# Patient Record
Sex: FEMALE | Race: WHITE | NOT HISPANIC OR LATINO | Employment: OTHER | URBAN - NONMETROPOLITAN AREA
[De-identification: names, ages, dates, MRNs, and addresses within clinical notes are randomized per-mention and may not be internally consistent; named-entity substitution may affect disease eponyms.]

---

## 2017-01-03 RX ORDER — LORAZEPAM 1 MG/1
TABLET ORAL
Qty: 60 TABLET | Refills: 0
Start: 2017-01-03 | End: 2017-02-07 | Stop reason: SDUPTHER

## 2017-01-18 DIAGNOSIS — K21.9 GASTROESOPHAGEAL REFLUX DISEASE WITHOUT ESOPHAGITIS: ICD-10-CM

## 2017-01-18 RX ORDER — OMEPRAZOLE 40 MG/1
40 CAPSULE, DELAYED RELEASE ORAL 2 TIMES DAILY
Qty: 180 CAPSULE | Refills: 1 | Status: SHIPPED | OUTPATIENT
Start: 2017-01-18 | End: 2017-04-04

## 2017-01-18 NOTE — TELEPHONE ENCOUNTER
Patient requests refills on Omeprazole 40 mg sent to [facility] in a 90 day supply.

## 2017-01-23 ENCOUNTER — OFFICE VISIT (OUTPATIENT)
Dept: OBSTETRICS AND GYNECOLOGY | Facility: CLINIC | Age: 62
End: 2017-01-23

## 2017-01-23 VITALS
SYSTOLIC BLOOD PRESSURE: 122 MMHG | HEIGHT: 69 IN | WEIGHT: 157 LBS | BODY MASS INDEX: 23.25 KG/M2 | DIASTOLIC BLOOD PRESSURE: 60 MMHG

## 2017-01-23 DIAGNOSIS — Z12.4 SCREENING FOR MALIGNANT NEOPLASM OF CERVIX: ICD-10-CM

## 2017-01-23 DIAGNOSIS — N95.1 MENOPAUSAL AND FEMALE CLIMACTERIC STATES: ICD-10-CM

## 2017-01-23 DIAGNOSIS — Z01.419 ENCOUNTER FOR GYNECOLOGICAL EXAMINATION WITHOUT ABNORMAL FINDING: Primary | ICD-10-CM

## 2017-01-23 PROCEDURE — 99396 PREV VISIT EST AGE 40-64: CPT | Performed by: PHYSICIAN ASSISTANT

## 2017-01-23 RX ORDER — ESTRADIOL 2 MG/1
2 RING VAGINAL
Qty: 1 EACH | Refills: 3 | Status: SHIPPED | OUTPATIENT
Start: 2017-01-23 | End: 2017-04-01 | Stop reason: SDUPTHER

## 2017-01-23 NOTE — PROGRESS NOTES
Subjective   Chief Complaint   Patient presents with   • Gynecologic Exam     Leticia Arreguin is a 61 y.o. year old  presenting to be seen for her annual gyn exam.  Patient has no complaints or concerns  She desires refills estring which works well for her  Her last mmg was 2015    Past Medical History   Diagnosis Date   • Anxiety    • Asthma      AS CHILD   • Colonic polyp    • Depression    • Diverticulosis    • Dyslipidemia    • Electrocardiogram finding, abnormal, without diagnosis    • Elevated LFTs    • Esophageal reflux    • Fall    • Female pelvic pain    • Fibromyalgia    • Fracture    • GERD (gastroesophageal reflux disease)    • Gestational diabetes    • H/O mammogram    • Headache    • History of blood transfusion    • History of Holter monitoring      Findings were normal..    • Hypertension    • Insomnia    • Joint pain    • Migraine    • Osteoarthritis    • Ovarian cyst    • Pain in joint of left hip    • Recurrent UTI (urinary tract infection)    • Rheumatic fever    • Shoulder sprain    • Swelling of ankle joint    • Tinnitus    • Vaginitis    • Vitamin B12 deficiency    • Vitamin D deficiency disease         Current Outpatient Prescriptions:   •  atorvastatin (LIPITOR) 40 MG tablet, Take 1 tablet by mouth Daily., Disp: 90 tablet, Rfl: 1  •  ESTRING 2 MG vaginal ring, Insert 2 mg into the vagina Every 3 (Three) Months., Disp: 1 each, Rfl: 3  •  omeprazole (priLOSEC) 40 MG capsule, Take 1 capsule by mouth 2 (Two) Times a Day., Disp: 180 capsule, Rfl: 1  •  sertraline (ZOLOFT) 50 MG tablet, Take 1.5 tablets by mouth Every Night for 90 days., Disp: 135 tablet, Rfl: 0  •  triamterene-hydrochlorothiazide (MAXZIDE-25) 37.5-25 MG per tablet, Take 1 tablet by mouth Daily., Disp: 90 tablet, Rfl: 0  •  LORazepam (ATIVAN) 1 MG tablet, 1/2 tab po qam and q afternoon as needed for anxiety and 1 tab po nightly, Disp: 60 tablet, Rfl: 0  •  mirtazapine (REMERON) 30 MG tablet, Take 1.5 tablets by mouth  Every Night., Disp: 30 tablet, Rfl: 0   Allergies   Allergen Reactions   • Clarithromycin    • Gabapentin    • Nitrofurantoin Nausea Only   • Penicillins    • Risperidone    • Sulfa Antibiotics    • Sulfamethoxazole-Trimethoprim    • Zyprexa [Olanzapine] Other (See Comments)     Extreme agitation   • Ciprofloxacin Rash      Past Surgical History   Procedure Laterality Date   • Appendectomy     • Cholecystectomy     • Colonoscopy       08/06/2016 DR. RAY   • Dilation and curettage, diagnostic / therapeutic     • Total hip arthroplasty Bilateral      LEFT-2010   RIGHT-2003   • Tonsillectomy     • Wrist surgery     • Hip surgery       LEFT HIP REVISION DONOR BONE 06/03/2014   • Abdominal surgery     • Fracture surgery     • Joint replacement     • Electroconvulsive therapy Bilateral 10/6/2016     Procedure: BIFRONTAL ELECTROCONVULSIVE THERAPY;  Surgeon: Omi Foss MD;  Location: [facility] OR;  Service:    • Electroconvulsive therapy Bilateral 10/11/2016     Procedure: BIFRONTAL ELECTROCONVULSIVE THERAPY;  Surgeon: Omi Foss MD;  Location: [facility] OR;  Service:    • Electroconvulsive therapy Bilateral 10/17/2016     Procedure: BIFRONTAL ELECTROCONVULSIVE THERAPY;  Surgeon: Omi Foss MD;  Location: [facility] OR;  Service:    • Electroconvulsive therapy Bilateral 10/28/2016     Procedure: ELECTROCONVULSIVE THERAPY;  Surgeon: Omi Foss MD;  Location: [facility] OR;  Service:    • Electroconvulsive therapy Bilateral 11/1/2016     Procedure: ELECTROCONVULSIVE THERAPY;  Surgeon: Omi Foss MD;  Location: [facility] OR;  Service:    • Electroconvulsive therapy Bilateral 11/3/2016     Procedure: ELECTROCONVULSIVE THERAPY;  Surgeon: Omi Foss MD;  Location: [facility] OR;  Service:    • Total hip arthroplasty  2004,2011,2015      Social History     Social History   • Marital status:      Spouse name: N/A   • Number of children: N/A   • Years of  "education: N/A     Occupational History   • Not on file.     Social History Main Topics   • Smoking status: Never Smoker   • Smokeless tobacco: Never Used   • Alcohol use No   • Drug use: No   • Sexual activity: Yes     Partners: Male     Other Topics Concern   • Not on file     Social History Narrative      Family History   Problem Relation Age of Onset   • Arthritis Mother    • Ovarian cancer Mother    • Stomach cancer Mother    • Migraines Mother    • Mental illness Mother    • Depression Mother    • Cancer Mother    • Arthritis Father    • Seizures Father      EPILEPSY   • Hypertension Father    • Osteoporosis Father    • Mental illness Father    • Hyperlipidemia Father    • Stroke Father    • Depression Father    • Arthritis Sister    • Cancer Sister    • Migraines Sister    • Cancer Other    • Diabetes Other    • Arthritis Other    • ADD / ADHD Neg Hx    • Alcohol abuse Neg Hx    • Anxiety disorder Neg Hx    • Bipolar disorder Neg Hx    • Dementia Neg Hx    • Drug abuse Neg Hx    • Paranoid behavior Neg Hx    • Schizophrenia Neg Hx    • Suicide Attempts Neg Hx        The following portions of the patient's history were reviewed and updated as appropriate:problem list, current medications, allergies, past family history, past medical history, past social history and past surgical history.    Review of Systems   Constitutional: Positive for unexpected weight change.   Gastrointestinal: Negative.    Genitourinary: Negative.    Musculoskeletal: Positive for arthralgias.           Objective   Visit Vitals   • /60   • Ht 69\" (175.3 cm)   • Wt 157 lb (71.2 kg)   • BMI 23.18 kg/m2       Physical Exam   Constitutional: Vital signs are normal. She appears well-developed and well-nourished.   Neck: No thyroid mass and no thyromegaly present.   Cardiovascular: Normal rate, regular rhythm and normal heart sounds.    Pulmonary/Chest: Effort normal.   Abdominal: Soft. Normal appearance. She exhibits no distension and " no mass. There is no tenderness.   Genitourinary: Vagina normal and uterus normal. There is no rash or tenderness on the right labia. There is no rash or tenderness on the left labia. Cervix exhibits no motion tenderness, no discharge and no friability. Right adnexum displays no mass and no tenderness. Left adnexum displays no mass and no tenderness.   Genitourinary Comments: Pap done   Skin: Skin is warm, dry and intact.   Psychiatric: She has a normal mood and affect. Her behavior is normal.            Assessment /Plan      Leticia was seen today for gynecologic exam.    Diagnoses and all orders for this visit:    Encounter for gynecological examination without abnormal finding  -     Mammo Screening Bilateral With CAD; Future    Menopausal and female climacteric states    Screening for malignant neoplasm of cervix  -     Liquid-based Pap Smear, Screening    Other orders  -     ESTRING 2 MG vaginal ring; Insert 2 mg into the vagina Every 3 (Three) Months.               This note was electronically signed.    Tonya Weaver PA-C   January 23, 2017

## 2017-01-23 NOTE — MR AVS SNAPSHOT
Leticia Arreguin   1/23/2017 3:40 PM   Office Visit    Dept Phone:  [phone]   Encounter #:  [id number]    Provider:  Tonya Weaver PA-C   Department:  [facility] OBSTETRICS AND GYNECOLOGY                Your Full Care Plan              Today's Medication Changes          These changes are accurate as of: 1/23/17  4:28 PM.  If you have any questions, ask your nurse or doctor.               Medication(s)that have changed:     ESTRING 2 MG vaginal ring   Generic drug:  estradiol   Insert 2 mg into the vagina Every 3 (Three) Months.   What changed:    - how much to take  - how to take this  - when to take this   Changed by:  Tonya Weaver PA-C            Where to Get Your Medications      These medications were sent to [facility]/pharmacy #[id number] - [place], [place] - [address] - [phone]  - [phone]   [address] [address]     Phone:  [phone]     ESTRING 2 MG vaginal ring                  Your Updated Medication List          This list is accurate as of: 1/23/17  4:28 PM.  Always use your most recent med list.                atorvastatin 40 MG tablet   Commonly known as:  LIPITOR   Take 1 tablet by mouth Daily.       ESTRING 2 MG vaginal ring   Generic drug:  estradiol   Insert 2 mg into the vagina Every 3 (Three) Months.       LORazepam 1 MG tablet   Commonly known as:  ATIVAN   1/2 tab po qam and q afternoon as needed for anxiety and 1 tab po nightly       mirtazapine 30 MG tablet   Commonly known as:  REMERON   Take 1.5 tablets by mouth Every Night.       omeprazole 40 MG capsule   Commonly known as:  priLOSEC   Take 1 capsule by mouth 2 (Two) Times a Day.       sertraline 50 MG tablet   Commonly known as:  ZOLOFT   Take 1.5 tablets by mouth Every Night for 90 days.       triamterene-hydrochlorothiazide 37.5-25 MG per tablet   Commonly known as:  MAXZIDE-25   Take 1 tablet by mouth Daily.               We Performed the Following     Liquid-based Pap Smear, Screening       You Were Diagnosed With        Codes Comments    Encounter for gynecological examination without abnormal finding    -  Primary ICD-10-CM: Z01.419  ICD-9-CM: V72.31     Menopausal and female climacteric states     ICD-10-CM: N95.1  ICD-9-CM: 627.2     Screening for malignant neoplasm of cervix     ICD-10-CM: Z12.4  ICD-9-CM: V76.2       Instructions     None    Patient Instructions History      Upcoming Appointments     Visit Type Date Time Department    ANNUAL 2017  3:40 PM [facility]    MEDICINE CHECK 2017  4:30 PM [facility]      [employer] Signup     [employer] allows you to send messages to your doctor, view your test results, renew your prescriptions, schedule appointments, and more. To sign up, go to [employer] and click on the Sign Up Now link in the New User? box. Enter your [employer] Activation Code exactly as it appears below along with the last four digits of your Social Security Number and your Date of Birth () to complete the sign-up process. If you do not sign up before the expiration date, you must request a new code.    [employer] Activation Code: [email or web address]  Expires: 2017  5:36 AM    If you have questions, you can email [email or web address] or call [phone] to talk to our [employer] staff. Remember, [employer] is NOT to be used for urgent needs. For medical emergencies, dial 911.               Other Info from Your Visit           Your Appointments     2017  4:30 PM EST   Medicine Check with Rafael Sinclair MD   [facility] (--)    [address] [address] [phone]              Allergies     Clarithromycin      Gabapentin      Nitrofurantoin  Nausea Only    Penicillins      Risperidone      Sulfa Antibiotics      Sulfamethoxazole-trimethoprim      Zyprexa [Olanzapine] Intolerance Other (See Comments)    Extreme agitation  "   Ciprofloxacin  Rash      Reason for Visit     Gynecologic Exam           Vital Signs     Blood Pressure Height Weight Body Mass Index Smoking Status       122/60 69\" (175.3 cm) 157 lb (71.2 kg) 23.18 kg/m2 Never Smoker       Problems and Diagnoses Noted     Encounter for routine gynecological examination    -  Primary    Menopausal and female climacteric states        Screening for cervical cancer            "

## 2017-02-07 ENCOUNTER — OFFICE VISIT (OUTPATIENT)
Dept: PSYCHIATRY | Facility: CLINIC | Age: 62
End: 2017-02-07

## 2017-02-07 VITALS — WEIGHT: 156 LBS | BODY MASS INDEX: 23.11 KG/M2 | HEIGHT: 69 IN

## 2017-02-07 DIAGNOSIS — F33.2 MDD (MAJOR DEPRESSIVE DISORDER), RECURRENT SEVERE, WITHOUT PSYCHOSIS (HCC): Primary | ICD-10-CM

## 2017-02-07 DIAGNOSIS — F41.1 GAD (GENERALIZED ANXIETY DISORDER): ICD-10-CM

## 2017-02-07 PROCEDURE — 99213 OFFICE O/P EST LOW 20 MIN: CPT | Performed by: PSYCHIATRY & NEUROLOGY

## 2017-02-07 RX ORDER — LORAZEPAM 1 MG/1
1 TABLET ORAL NIGHTLY
Qty: 60 TABLET | Refills: 0
Start: 2017-02-07 | End: 2017-02-27 | Stop reason: SDUPTHER

## 2017-02-07 NOTE — PROGRESS NOTES
"  CC:f/u MDD    HX:  The patient reports she is doing okay.  She reports her depression and anxiety are under much better control despite major issues in her family.  She's had the deal with the unexpected death of her brother-in-law, her son being issued divorce papers over the holiday which required him to live with her until this past weekend.  Her  is also now working full-time.  These have been stressful but says she is managing.  She quit taking the Remeron in January because she is having difficulty with weight gain.  I discussed that her weight has remained stable for the past several months, however she feels unhealthy and feels like this is putting unneeded pressure on her hips.  She says she is staying active and yet has not been able to lose weight.  She also has cut Ativan down to 1 mg nightly.  She has not seen any difference in her libido since stopping the Remeron.  She denies any suicidal or homicidal thoughts.      Appetite-fair, no changes  Energy level-*good.  Sleep-good sleep initiation however is awakening at 3 AM and having difficulty getting back to sleep.    I have reviewed pt's allergies and current medications.     Review of Systems   Constitutional: Negative.    Cardiovascular: Negative.    Gastrointestinal: Negative.    Neurological: Negative.      Visit Vitals   • Ht 69\" (175.3 cm)   • Wt 156 lb (70.8 kg)   • BMI 23.04 kg/m2       EXAM: Neatly, casually dressed, good hygeine. Gait and station stable. No psychomotor agitation/retardation. No motor tics Speech is normal rate, amount. Associations intact. Mood \"I'm fine\" affect slightly anxious.. TC-goal directed.  Denies SI/HI/VH/AH. TP-linear.  Attention and concentration are fair. Memory is intact for recent and remote events. Insight-limited, judgement- limited      Encounter Diagnoses   Name Primary?   • MDD (major depressive disorder), recurrent severe, without psychosis Yes   • INDIRA (generalized anxiety disorder)  "       Current Outpatient Prescriptions   Medication Sig Dispense Refill   • atorvastatin (LIPITOR) 40 MG tablet Take 1 tablet by mouth Daily. 90 tablet 1   • ESTRING 2 MG vaginal ring Insert 2 mg into the vagina Every 3 (Three) Months. 1 each 3   • LORazepam (ATIVAN) 1 MG tablet Take 1 tablet by mouth Every Night. 1/2 tab po qam and q afternoon as needed for anxiety and 1 tab po nightly 60 tablet 0   • omeprazole (priLOSEC) 40 MG capsule Take 1 capsule by mouth 2 (Two) Times a Day. 180 capsule 1   • sertraline (ZOLOFT) 50 MG tablet Take 1.5 tablets by mouth Every Night for 90 days. 135 tablet 0   • triamterene-hydrochlorothiazide (MAXZIDE-25) 37.5-25 MG per tablet Take 1 tablet by mouth Daily. 90 tablet 0     No current facility-administered medications for this visit.     plan:  1.  I discussed that she can try decreasing Zoloft to 50 mg however this may worsen her depression and anxiety.  She understood this risk but wanted to do this anyway if it would help with her weight gain.  2.  Continue Ativan at current dose.  I instructed her if she felt like she did not need it she could take half a tablet instead but not to quit it all at once.  3.  I again encouraged her to see a therapist; however the patient made multiple excuses particularly regarding time about not being able to see someone.  4.  Return to clinic 3 months or sooner if needed.          The risks, benefits, and treatment alternatives were discussed with the patient.     TIME IN: 436PM  TIME OUT:5:01P

## 2017-02-17 RX ORDER — MIRTAZAPINE 30 MG/1
TABLET, FILM COATED ORAL
Qty: 90 TABLET | Refills: 0 | OUTPATIENT
Start: 2017-02-17

## 2017-02-24 RX ORDER — TRIAMTERENE AND HYDROCHLOROTHIAZIDE 37.5; 25 MG/1; MG/1
TABLET ORAL
Qty: 90 TABLET | Refills: 0 | Status: SHIPPED | OUTPATIENT
Start: 2017-02-24 | End: 2017-04-04 | Stop reason: SDUPTHER

## 2017-02-24 NOTE — TELEPHONE ENCOUNTER
Patient has not been seen since October and I know she has many issues.  Was not sure if I should send refills.

## 2017-02-27 RX ORDER — LORAZEPAM 1 MG/1
1 TABLET ORAL NIGHTLY
Qty: 60 TABLET | Refills: 0 | Status: CANCELLED
Start: 2017-02-27

## 2017-02-27 RX ORDER — LORAZEPAM 1 MG/1
1 TABLET ORAL NIGHTLY
Qty: 60 TABLET | Refills: 0
Start: 2017-02-27 | End: 2017-04-25 | Stop reason: SDUPTHER

## 2017-02-27 NOTE — TELEPHONE ENCOUNTER
Please call in lorazepam 1 mg nightly, #60, no refills    Also, Zoloft 50 mg nightly, #90, no refills    I had changed her doses which should be reflected on her medication list

## 2017-03-14 ENCOUNTER — OFFICE VISIT (OUTPATIENT)
Dept: FAMILY MEDICINE CLINIC | Facility: CLINIC | Age: 62
End: 2017-03-14

## 2017-03-14 VITALS
SYSTOLIC BLOOD PRESSURE: 122 MMHG | TEMPERATURE: 98.3 F | HEART RATE: 74 BPM | HEIGHT: 69 IN | RESPIRATION RATE: 16 BRPM | DIASTOLIC BLOOD PRESSURE: 73 MMHG | WEIGHT: 156 LBS | BODY MASS INDEX: 23.11 KG/M2 | OXYGEN SATURATION: 100 %

## 2017-03-14 DIAGNOSIS — J01.90 ACUTE BACTERIAL SINUSITIS: ICD-10-CM

## 2017-03-14 DIAGNOSIS — B96.89 ACUTE BACTERIAL SINUSITIS: ICD-10-CM

## 2017-03-14 DIAGNOSIS — Z20.818 STREP THROAT EXPOSURE: Primary | ICD-10-CM

## 2017-03-14 DIAGNOSIS — R68.89 FLU-LIKE SYMPTOMS: ICD-10-CM

## 2017-03-14 LAB
EXPIRATION DATE: NORMAL
EXPIRATION DATE: NORMAL
FLUAV AG NPH QL: NORMAL
FLUBV AG NPH QL: NORMAL
INTERNAL CONTROL: NORMAL
INTERNAL CONTROL: NORMAL
Lab: NORMAL
Lab: NORMAL
S PYO AG THROAT QL: NEGATIVE

## 2017-03-14 PROCEDURE — 87880 STREP A ASSAY W/OPTIC: CPT | Performed by: INTERNAL MEDICINE

## 2017-03-14 PROCEDURE — 99213 OFFICE O/P EST LOW 20 MIN: CPT | Performed by: INTERNAL MEDICINE

## 2017-03-14 PROCEDURE — 87804 INFLUENZA ASSAY W/OPTIC: CPT | Performed by: INTERNAL MEDICINE

## 2017-03-14 RX ORDER — AMOXICILLIN AND CLAVULANATE POTASSIUM 875; 125 MG/1; MG/1
1 TABLET, FILM COATED ORAL 2 TIMES DAILY
Qty: 14 TABLET | Refills: 0 | Status: SHIPPED | OUTPATIENT
Start: 2017-03-14 | End: 2017-03-15 | Stop reason: SDUPTHER

## 2017-03-14 RX ORDER — AMOXICILLIN AND CLAVULANATE POTASSIUM 875; 125 MG/1; MG/1
1 TABLET, FILM COATED ORAL 2 TIMES DAILY
Qty: 14 TABLET | Refills: 0 | Status: SHIPPED | OUTPATIENT
Start: 2017-03-14 | End: 2017-03-14 | Stop reason: SDUPTHER

## 2017-03-14 NOTE — PATIENT INSTRUCTIONS

## 2017-03-14 NOTE — PROGRESS NOTES
Chief Complaint   Patient presents with   • Follow-up     Patient states she is here to be seen for flu like symptoms.    • Nasal Congestion     since Friday.   • Generalized Body Aches   • Fever     Sunday started fever and chills.    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        Subjective     History of Present Illness   Leticia Arreguin is a 61 y.o. female presenting with 1 week of generalized body aches with worsening symptoms over the last 3 days.  She states that she has been having nighttime fever and chills, nasal congestion, green productive sputum, and congestion over her maxillary sinuses.  Symptoms worsen at night.  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    The following portions of the patient's history were reviewed and updated as appropriate: allergies, current medications, past family history, past medical history, past social history, past surgical history and problem list.    Review of Systems   Constitutional: Negative for chills, fatigue and fever.   HENT: Negative for congestion, ear pain, rhinorrhea and sore throat.    Respiratory: Negative for cough, shortness of breath and wheezing.    Cardiovascular: Negative for chest pain, palpitations and leg swelling.   Gastrointestinal: Negative for abdominal pain, blood in stool, constipation, diarrhea, nausea and vomiting.   Genitourinary: Negative for dysuria and hematuria.   Musculoskeletal: Negative for back pain.   Skin: Negative.    Neurological: Negative for dizziness, light-headedness and headaches.   Psychiatric/Behavioral: Negative for dysphoric mood and sleep disturbance. The patient is not nervous/anxious.        Allergies   Allergen Reactions   • Clarithromycin    • Gabapentin    • Nitrofurantoin Nausea Only   • Penicillins    • Risperidone    • Sulfa Antibiotics    • Sulfamethoxazole-Trimethoprim    • Zyprexa [Olanzapine] Other  (See Comments)     Extreme agitation   • Ciprofloxacin Rash       Past Medical History   Diagnosis Date   • Anxiety    • Asthma      AS CHILD   • Colonic polyp    • Depression    • Diverticulosis    • Dyslipidemia    • Electrocardiogram finding, abnormal, without diagnosis    • Elevated LFTs    • Esophageal reflux    • Fall    • Female pelvic pain    • Fibromyalgia    • Fracture    • GERD (gastroesophageal reflux disease)    • Gestational diabetes    • H/O mammogram    • Headache    • History of blood transfusion    • History of Holter monitoring      Findings were normal..    • Hypertension    • Insomnia    • Joint pain    • Migraine    • Osteoarthritis    • Ovarian cyst    • Pain in joint of left hip    • Recurrent UTI (urinary tract infection)    • Rheumatic fever    • Shoulder sprain    • Swelling of ankle joint    • Tinnitus    • Vaginitis    • Vitamin B12 deficiency    • Vitamin D deficiency disease        Social History     Social History   • Marital status:      Spouse name: N/A   • Number of children: N/A   • Years of education: N/A     Occupational History   • Not on file.     Social History Main Topics   • Smoking status: Never Smoker   • Smokeless tobacco: Never Used   • Alcohol use No   • Drug use: No   • Sexual activity: Yes     Partners: Male     Other Topics Concern   • Not on file     Social History Narrative        Past Surgical History   Procedure Laterality Date   • Appendectomy     • Cholecystectomy     • Colonoscopy       08/06/2016 DR. RAY   • Dilation and curettage, diagnostic / therapeutic     • Total hip arthroplasty Bilateral      LEFT-2010   RIGHT-2003   • Tonsillectomy     • Wrist surgery     • Hip surgery       LEFT HIP REVISION DONOR BONE 06/03/2014   • Abdominal surgery     • Fracture surgery     • Joint replacement     • Electroconvulsive therapy Bilateral 10/6/2016     Procedure: BIFRONTAL ELECTROCONVULSIVE THERAPY;  Surgeon: Omi Foss MD;  Location: [facility]  OR;  Service:    • Electroconvulsive therapy Bilateral 10/11/2016     Procedure: BIFRONTAL ELECTROCONVULSIVE THERAPY;  Surgeon: Omi Foss MD;  Location:  [facility] OR;  Service:    • Electroconvulsive therapy Bilateral 10/17/2016     Procedure: BIFRONTAL ELECTROCONVULSIVE THERAPY;  Surgeon: Omi Foss MD;  Location:  [facility] OR;  Service:    • Electroconvulsive therapy Bilateral 10/28/2016     Procedure: ELECTROCONVULSIVE THERAPY;  Surgeon: Omi Foss MD;  Location:  [facility] OR;  Service:    • Electroconvulsive therapy Bilateral 11/1/2016     Procedure: ELECTROCONVULSIVE THERAPY;  Surgeon: Omi Foss MD;  Location:  [facility] OR;  Service:    • Electroconvulsive therapy Bilateral 11/3/2016     Procedure: ELECTROCONVULSIVE THERAPY;  Surgeon: Omi Foss MD;  Location:  [facility] OR;  Service:    • Total hip arthroplasty  2004,2011,2015       Family History   Problem Relation Age of Onset   • Arthritis Mother    • Ovarian cancer Mother    • Stomach cancer Mother    • Migraines Mother    • Mental illness Mother    • Depression Mother    • Cancer Mother    • Arthritis Father    • Seizures Father      EPILEPSY   • Hypertension Father    • Osteoporosis Father    • Mental illness Father    • Hyperlipidemia Father    • Stroke Father    • Depression Father    • Arthritis Sister    • Cancer Sister    • Migraines Sister    • Cancer Other    • Diabetes Other    • Arthritis Other    • ADD / ADHD Neg Hx    • Alcohol abuse Neg Hx    • Anxiety disorder Neg Hx    • Bipolar disorder Neg Hx    • Dementia Neg Hx    • Drug abuse Neg Hx    • Paranoid behavior Neg Hx    • Schizophrenia Neg Hx    • Suicide Attempts Neg Hx          Current Outpatient Prescriptions:   •  ESTRING 2 MG vaginal ring, Insert 2 mg into the vagina Every 3 (Three) Months., Disp: 1 each, Rfl: 3  •  LORazepam (ATIVAN) 1 MG tablet, Take 1 tablet by mouth Every Night., Disp: 60 tablet, Rfl: 0  •  sertraline (ZOLOFT)  "50 MG tablet, Take 1 tablet by mouth Every Night for 90 days., Disp: 90 tablet, Rfl: 0  •  triamterene-hydrochlorothiazide (MAXZIDE-25) 37.5-25 MG per tablet, TAKE 1 TABLET BY MOUTH DAILY., Disp: 90 tablet, Rfl: 0  •  atorvastatin (LIPITOR) 40 MG tablet, Take 1 tablet by mouth Daily., Disp: 90 tablet, Rfl: 1  •  omeprazole (priLOSEC) 40 MG capsule, Take 1 capsule by mouth 2 (Two) Times a Day., Disp: 180 capsule, Rfl: 1    Objective   Visit Vitals   • /73 (BP Location: Left arm, Patient Position: Sitting, Cuff Size: Adult)   • Pulse 74   • Temp 98.3 °F (36.8 °C) (Tympanic)   • Resp 16   • Ht 69\" (175.3 cm)   • Wt 156 lb (70.8 kg)   • SpO2 100%   • BMI 23.04 kg/m2       Physical Exam   Constitutional: She is oriented to person, place, and time. She appears well-developed and well-nourished.   HENT:   Head: Normocephalic and atraumatic.   Eyes: Conjunctivae are normal.   Pulmonary/Chest: Effort normal.   Musculoskeletal: Normal range of motion.   Neurological: She is alert and oriented to person, place, and time.   Psychiatric: She has a normal mood and affect. Her behavior is normal.   Nursing note and vitals reviewed.    Results for orders placed or performed in visit on 03/14/17   POCT Influenza A/B   Result Value Ref Range    Rapid Influenza A Ag NEG     Rapid Influenza B Ag NEG     Internal Control Passed Passed    Lot Number [id number]     Expiration Date 2019/11    POCT rapid strep A   Result Value Ref Range    Rapid Strep A Screen Negative Negative, VALID, INVALID, Not Performed    Internal Control Passed Passed    Lot Number [id number]     Expiration Date 2018/7        Assessment/Plan   Leticia was seen today for follow-up, nasal congestion, generalized body aches, fever and sore throat.    Diagnoses and all orders for this visit:    Strep throat exposure  -     POCT rapid strep A    Flu-like symptoms  -     POCT Influenza A/B          Discussion Summary:  61-year-old white female presenting with acute " bacterial sinusitis.  Patient treated with Augmentin and advised on trying missed and nasal spray such as Flonase over-the-counter.      Follow up:  No Follow-up on file.     Patient Instructions:  Patient instructions were provided.

## 2017-03-15 ENCOUNTER — TELEPHONE (OUTPATIENT)
Dept: FAMILY MEDICINE CLINIC | Facility: CLINIC | Age: 62
End: 2017-03-15

## 2017-03-15 DIAGNOSIS — B96.89 ACUTE BACTERIAL SINUSITIS: ICD-10-CM

## 2017-03-15 DIAGNOSIS — J01.90 ACUTE BACTERIAL SINUSITIS: ICD-10-CM

## 2017-03-15 RX ORDER — AMOXICILLIN AND CLAVULANATE POTASSIUM 875; 125 MG/1; MG/1
1 TABLET, FILM COATED ORAL 2 TIMES DAILY
Qty: 14 TABLET | Refills: 0 | Status: SHIPPED | OUTPATIENT
Start: 2017-03-15 | End: 2017-04-04

## 2017-03-15 RX ORDER — AMOXICILLIN AND CLAVULANATE POTASSIUM 875; 125 MG/1; MG/1
1 TABLET, FILM COATED ORAL 2 TIMES DAILY
Qty: 14 TABLET | Refills: 0 | Status: SHIPPED | OUTPATIENT
Start: 2017-03-15 | End: 2017-03-15 | Stop reason: SDUPTHER

## 2017-03-15 NOTE — TELEPHONE ENCOUNTER
Patient called stating that her medication was never received by the pharmacy yesterday. I checked the chart, Augmentin was printed instead of faxed. Resent, patient informed.

## 2017-03-29 ENCOUNTER — TELEPHONE (OUTPATIENT)
Dept: OBSTETRICS AND GYNECOLOGY | Facility: CLINIC | Age: 62
End: 2017-03-29

## 2017-03-29 NOTE — TELEPHONE ENCOUNTER
----- Message from Leatha Maurer sent at 3/29/2017 10:07 AM EDT -----  Contact: PT  PT IS REQUESTING A SCRIPT OF TAPAZOLE, SHE IS CURRENTLY HAVING YEAST INFECTION SYMPTOMS, ITCHING, BURNING AND DISCHARGE. HAS BEEN TAKING AUGMENTIN.    [phone]  KITTY  TAPAZOLE  [facility]

## 2017-04-03 RX ORDER — ESTRADIOL 2 MG/1
RING VAGINAL
Qty: 1 EACH | Refills: 0 | Status: SHIPPED | OUTPATIENT
Start: 2017-04-03 | End: 2017-07-02 | Stop reason: SDUPTHER

## 2017-04-04 ENCOUNTER — OFFICE VISIT (OUTPATIENT)
Dept: FAMILY MEDICINE CLINIC | Facility: CLINIC | Age: 62
End: 2017-04-04

## 2017-04-04 VITALS
SYSTOLIC BLOOD PRESSURE: 121 MMHG | OXYGEN SATURATION: 98 % | TEMPERATURE: 97.8 F | DIASTOLIC BLOOD PRESSURE: 76 MMHG | HEART RATE: 74 BPM | HEIGHT: 69 IN | BODY MASS INDEX: 22.51 KG/M2 | WEIGHT: 152 LBS

## 2017-04-04 DIAGNOSIS — M25.50 ARTHRALGIA OF MULTIPLE JOINTS: ICD-10-CM

## 2017-04-04 DIAGNOSIS — I10 BENIGN ESSENTIAL HYPERTENSION: Primary | ICD-10-CM

## 2017-04-04 DIAGNOSIS — E78.00 PURE HYPERCHOLESTEROLEMIA: ICD-10-CM

## 2017-04-04 DIAGNOSIS — F33.1 MODERATE EPISODE OF RECURRENT MAJOR DEPRESSIVE DISORDER (HCC): ICD-10-CM

## 2017-04-04 DIAGNOSIS — F41.9 ANXIETY: ICD-10-CM

## 2017-04-04 LAB
ALBUMIN SERPL-MCNC: 4.5 G/DL (ref 3.5–5)
ALBUMIN/GLOB SERPL: 1.4 G/DL (ref 1–2)
ALP SERPL-CCNC: 85 U/L (ref 38–126)
ALT SERPL-CCNC: 191 U/L (ref 13–69)
AST SERPL-CCNC: 108 U/L (ref 15–46)
BASOPHILS # BLD AUTO: 0.07 10*3/MM3 (ref 0–0.2)
BASOPHILS NFR BLD AUTO: 0.9 % (ref 0–2.5)
BILIRUB SERPL-MCNC: 1 MG/DL (ref 0.2–1.3)
BUN SERPL-MCNC: 11 MG/DL (ref 7–20)
BUN/CREAT SERPL: 15.7 (ref 7.1–23.5)
CALCIUM SERPL-MCNC: 10 MG/DL (ref 8.4–10.2)
CHLORIDE SERPL-SCNC: 101 MMOL/L (ref 98–107)
CHOLEST SERPL-MCNC: 231 MG/DL (ref 0–199)
CO2 SERPL-SCNC: 28 MMOL/L (ref 26–30)
CREAT SERPL-MCNC: 0.7 MG/DL (ref 0.6–1.3)
EOSINOPHIL # BLD AUTO: 0.06 10*3/MM3 (ref 0–0.7)
EOSINOPHIL NFR BLD AUTO: 0.8 % (ref 0–7)
ERYTHROCYTE [DISTWIDTH] IN BLOOD BY AUTOMATED COUNT: 13.7 % (ref 11.5–14.5)
GLOBULIN SER CALC-MCNC: 3.2 GM/DL
GLUCOSE SERPL-MCNC: 81 MG/DL (ref 74–98)
HCT VFR BLD AUTO: 42.6 % (ref 37–47)
HDLC SERPL-MCNC: 79 MG/DL (ref 40–60)
HGB BLD-MCNC: 14.1 G/DL (ref 12–16)
IMM GRANULOCYTES # BLD: 0.02 10*3/MM3 (ref 0–0.06)
IMM GRANULOCYTES NFR BLD: 0.3 % (ref 0–0.6)
LDLC SERPL CALC-MCNC: 136 MG/DL (ref 0–99)
LYMPHOCYTES # BLD AUTO: 2 10*3/MM3 (ref 0.6–3.4)
LYMPHOCYTES NFR BLD AUTO: 25 % (ref 10–50)
MCH RBC QN AUTO: 30.3 PG (ref 27–31)
MCHC RBC AUTO-ENTMCNC: 33.1 G/DL (ref 30–37)
MCV RBC AUTO: 91.6 FL (ref 81–99)
MONOCYTES # BLD AUTO: 0.59 10*3/MM3 (ref 0–0.9)
MONOCYTES NFR BLD AUTO: 7.4 % (ref 0–12)
NEUTROPHILS # BLD AUTO: 5.26 10*3/MM3 (ref 2–6.9)
NEUTROPHILS NFR BLD AUTO: 65.6 % (ref 37–80)
NRBC BLD AUTO-RTO: 0 /100 WBC (ref 0–0)
PLATELET # BLD AUTO: 310 10*3/MM3 (ref 130–400)
POTASSIUM SERPL-SCNC: 4 MMOL/L (ref 3.5–5.1)
PROT SERPL-MCNC: 7.7 G/DL (ref 6.3–8.2)
RBC # BLD AUTO: 4.65 10*6/MM3 (ref 4.2–5.4)
SODIUM SERPL-SCNC: 139 MMOL/L (ref 137–145)
TRIGL SERPL-MCNC: 80 MG/DL
TSH SERPL DL<=0.005 MIU/L-ACNC: 1.54 MIU/ML (ref 0.47–4.68)
VLDLC SERPL CALC-MCNC: 16 MG/DL
WBC # BLD AUTO: 8 10*3/MM3 (ref 4.8–10.8)

## 2017-04-04 PROCEDURE — 99214 OFFICE O/P EST MOD 30 MIN: CPT | Performed by: INTERNAL MEDICINE

## 2017-04-04 RX ORDER — TRIAMTERENE AND HYDROCHLOROTHIAZIDE 37.5; 25 MG/1; MG/1
1 TABLET ORAL DAILY
Qty: 90 TABLET | Refills: 3 | Status: SHIPPED | OUTPATIENT
Start: 2017-04-04 | End: 2017-12-12 | Stop reason: SDUPTHER

## 2017-04-04 NOTE — PROGRESS NOTES
Subjective   Leticia Arreguin is a 61 y.o. female.     Chief Complaint   Patient presents with   • Follow-up   • Hypertension   • Hyperlipidemia   • Joint Pain       History of Present Illness   Patient is here to follow-up on the blood pressure should been taking medications as prescribed.  She is also to follow-up on cholesterol.  She is fasting for lab work today,patient stopped atorvastatin due to leg pain; patient also stopped omeprazole due to possible link to Alzheimers .  She complains of joint pain and would like to see a rheumatologist to see the psychiatrist for anxiety and depression, stable on current medication    The following portions of the patient's history were reviewed and updated as appropriate: allergies, current medications, past family history, past medical history, past social history, past surgical history and problem list.    Review of Systems   Constitutional: Negative for appetite change, fatigue and fever.   HENT: Negative for congestion, ear discharge, ear pain, sinus pressure and sore throat.    Eyes: Negative for pain and discharge.   Respiratory: Negative for cough, chest tightness, shortness of breath and wheezing.    Cardiovascular: Negative for chest pain, palpitations and leg swelling.   Gastrointestinal: Negative for abdominal pain, blood in stool, constipation, diarrhea and nausea.   Endocrine: Negative for cold intolerance and heat intolerance.   Genitourinary: Negative for dysuria, flank pain and frequency.   Musculoskeletal: Positive for arthralgias. Negative for back pain and joint swelling.   Skin: Negative for color change.   Allergic/Immunologic: Negative for environmental allergies and food allergies.   Neurological: Negative for dizziness, weakness, numbness and headaches.   Hematological: Negative for adenopathy. Does not bruise/bleed easily.   Psychiatric/Behavioral: Negative for behavioral problems.        Stable anxiety and depression         Current Outpatient  "Prescriptions:   •  ESTRING 2 MG vaginal ring, INSERT IN THE VAGINA AS DIRECTED, Disp: 1 each, Rfl: 0  •  LORazepam (ATIVAN) 1 MG tablet, Take 1 tablet by mouth Every Night., Disp: 60 tablet, Rfl: 0  •  sertraline (ZOLOFT) 50 MG tablet, Take 1 tablet by mouth Every Night for 90 days., Disp: 90 tablet, Rfl: 0  •  terconazole (TERAZOL 7) 0.4 % vaginal cream, Insert 1 applicator q hs x 7 noc, Disp: 1 each, Rfl: 0  •  triamterene-hydrochlorothiazide (MAXZIDE-25) 37.5-25 MG per tablet, Take 1 tablet by mouth Daily., Disp: 90 tablet, Rfl: 3    Objective     Blood pressure 121/76, pulse 74, temperature 97.8 °F (36.6 °C), height 69\" (175.3 cm), weight 152 lb (68.9 kg), SpO2 98 %, not currently breastfeeding.    Physical Exam   Constitutional: She is oriented to person, place, and time. She appears well-developed and well-nourished. No distress.   HENT:   Head: Normocephalic and atraumatic.   Right Ear: External ear normal.   Left Ear: External ear normal.   Nose: Nose normal.   Mouth/Throat: Oropharynx is clear and moist.   Eyes: Conjunctivae and EOM are normal. Pupils are equal, round, and reactive to light.   Neck: Neck supple. No thyromegaly present.   Cardiovascular: Normal rate, regular rhythm and normal heart sounds.    Pulmonary/Chest: Effort normal and breath sounds normal. No respiratory distress.   Abdominal: Soft. Bowel sounds are normal. She exhibits no distension. There is no tenderness. There is no rebound.   Musculoskeletal: She exhibits deformity. She exhibits no edema.   Lymphadenopathy:     She has no cervical adenopathy.   Neurological: She is alert and oriented to person, place, and time.   No gross motor or sensory deficits   Skin: Skin is warm. She is not diaphoretic.   Psychiatric: She has a normal mood and affect.   Nursing note and vitals reviewed.      Results for orders placed or performed in visit on 03/14/17   POCT Influenza A/B   Result Value Ref Range    Rapid Influenza A Ag NEG     Rapid " Influenza B Ag NEG     Internal Control Passed Passed    Lot Number [id number]     Expiration Date 2019/11    POCT rapid strep A   Result Value Ref Range    Rapid Strep A Screen Negative Negative, VALID, INVALID, Not Performed    Internal Control Passed Passed    Lot Number [id number]     Expiration Date 2018/7          Assessment/Plan   Leticia was seen today for follow-up, hypertension, hyperlipidemia and joint pain.    Diagnoses and all orders for this visit:    Benign essential hypertension    Pure hypercholesterolemia  -     CBC & Differential  -     Comprehensive Metabolic Panel  -     Lipid Panel    Arthralgia of multiple joints  -     Ambulatory Referral to Rheumatology  -     TSH    Anxiety    Moderate episode of recurrent major depressive disorder    Other orders  -     triamterene-hydrochlorothiazide (MAXZIDE-25) 37.5-25 MG per tablet; Take 1 tablet by mouth Daily.      Discussion Summary:   1. Benign essential hypertension:low sodium  Diet advised, exercise counseled ,will continue current medication, will monitor vitals   2.mixed hyperlipidemia: Previous Labwork reviewed with patient, low cholesterol diet. Advise, will continue current medication.  3. Multiple joint pain : refer to rheumatology  4.  Anxiety disorder: To continue per psychiatry  5.  Major depression: To continue per psychiatry       Amanda David MD

## 2017-04-07 ENCOUNTER — HOSPITAL ENCOUNTER (OUTPATIENT)
Dept: MAMMOGRAPHY | Facility: HOSPITAL | Age: 62
Discharge: HOME | End: 2017-04-07
Admitting: PHYSICIAN ASSISTANT

## 2017-04-07 DIAGNOSIS — Z01.419 ENCOUNTER FOR GYNECOLOGICAL EXAMINATION WITHOUT ABNORMAL FINDING: ICD-10-CM

## 2017-04-07 PROCEDURE — G0202 SCR MAMMO BI INCL CAD: HCPCS

## 2017-04-07 PROCEDURE — 77063 BREAST TOMOSYNTHESIS BI: CPT

## 2017-04-25 ENCOUNTER — OFFICE VISIT (OUTPATIENT)
Dept: PSYCHIATRY | Facility: CLINIC | Age: 62
End: 2017-04-25

## 2017-04-25 VITALS — BODY MASS INDEX: 22.51 KG/M2 | WEIGHT: 152 LBS | HEIGHT: 69 IN

## 2017-04-25 DIAGNOSIS — F33.2 SEVERE EPISODE OF RECURRENT MAJOR DEPRESSIVE DISORDER, WITHOUT PSYCHOTIC FEATURES (HCC): ICD-10-CM

## 2017-04-25 DIAGNOSIS — F41.1 GENERALIZED ANXIETY DISORDER: Primary | ICD-10-CM

## 2017-04-25 PROCEDURE — 99213 OFFICE O/P EST LOW 20 MIN: CPT | Performed by: PSYCHIATRY & NEUROLOGY

## 2017-04-25 RX ORDER — LORAZEPAM 1 MG/1
1 TABLET ORAL NIGHTLY
Qty: 90 TABLET | Refills: 0
Start: 2017-04-25 | End: 2017-12-27 | Stop reason: SDUPTHER

## 2017-04-25 NOTE — PROGRESS NOTES
"  CC: Follow-up for depression and anxiety    HX: The patient reports overall she is doing okay.  She continues to state that she does not want to be on any medication and wants to continue tapering off of what she can.  Without notifying me, she has decreased the Zoloft 25 mg nightly and she is also been trying to decrease her Ativan to 0.5 mg.  She has noticed her sleep is not as good when she is decreased her Ativan.  She reports she continues to have chronic stress from issues with her family but is functioning well in her day-to-day life.  She continues to care for children and is looking forward to seeing her grandchild over the weekend.  She reports her mood is \"fine\"but then admitted that she is been somewhat irritable and denies any suicidal or homicidal thoughts.  Anxiety rating 2/10  Appetite-good, no changes   Energy level-good no changes  Sleep- typically poor but fair initiation with the Ativan    I have reviewed pt's allergies and current medications.     Review of Systems  Ht 69\" (175.3 cm)  Wt 152 lb (68.9 kg)  BMI 22.45 kg/m2    EXAM: Neatly, casually dressed, good hygeine. Gait and station stable. No psychomotor agitation/retardation. No motor tics Speech is normal rate, amount. Associations intact. Mood \"fine\" affect slightly anxious, constricted.. TC-goal directed.  Denies SI/HI/VH/AH. TP-linear.  Attention and concentration are fair. Memory is intact for recent and remote events.  InSight Limited, judgement-fair      Encounter Diagnoses   Name Primary?   • Generalized anxiety disorder Yes   • Severe episode of recurrent major depressive disorder, without psychotic features        Current Outpatient Prescriptions   Medication Sig Dispense Refill   • ESTRING 2 MG vaginal ring INSERT IN THE VAGINA AS DIRECTED 1 each 0   • LORazepam (ATIVAN) 1 MG tablet Take 1 tablet by mouth Every Night. 90 tablet 0   • terconazole (TERAZOL 7) 0.4 % vaginal cream Insert 1 applicator q hs x 7 noc 1 each 0   • " triamterene-hydrochlorothiazide (MAXZIDE-25) 37.5-25 MG per tablet Take 1 tablet by mouth Daily. 90 tablet 3     No current facility-administered medications for this visit.     plan:    1 stop Zoloft.  I discussed there may be rebound anxiety and irritability.  I also discussed that she may need to be on these medicines permanently because of the symptoms that she has had in the past; however I was agreeable to seeing how she did without the Zoloft  2.  Continue Ativan and I did give her permission to try using a half a dose nightly.  3.  Return to clinic 3 months          The risks, benefits, and treatment alternatives were discussed with the patient.     TIME IN: 4:39 PM  TIME OUT 4:57 PM

## 2017-05-17 ENCOUNTER — TELEPHONE (OUTPATIENT)
Dept: FAMILY MEDICINE CLINIC | Facility: CLINIC | Age: 62
End: 2017-05-17

## 2017-05-17 DIAGNOSIS — R74.8 ELEVATED LIVER ENZYMES: Primary | ICD-10-CM

## 2017-05-24 RX ORDER — TRIAMTERENE AND HYDROCHLOROTHIAZIDE 37.5; 25 MG/1; MG/1
TABLET ORAL
Qty: 90 TABLET | Refills: 3 | Status: SHIPPED | OUTPATIENT
Start: 2017-05-24 | End: 2018-05-19 | Stop reason: SDUPTHER

## 2017-06-02 ENCOUNTER — TELEPHONE (OUTPATIENT)
Dept: FAMILY MEDICINE CLINIC | Facility: CLINIC | Age: 62
End: 2017-06-02

## 2017-06-02 RX ORDER — AMOXICILLIN AND CLAVULANATE POTASSIUM 875; 125 MG/1; MG/1
1 TABLET, FILM COATED ORAL 2 TIMES DAILY
Qty: 20 TABLET | Refills: 0 | Status: SHIPPED | OUTPATIENT
Start: 2017-06-02 | End: 2017-07-25

## 2017-06-26 ENCOUNTER — TELEPHONE (OUTPATIENT)
Dept: FAMILY MEDICINE CLINIC | Facility: CLINIC | Age: 62
End: 2017-06-26

## 2017-06-26 NOTE — TELEPHONE ENCOUNTER
I informed patient that she would need to see someone for UTI, because without a culture we will not know what antibiotic would treat UTI, as it could be resistant to said antibiotic. Patient voiced understanding.    ----- Message from Leatha Bermudez sent at 6/26/2017 11:11 AM EDT -----  Contact: PATIENT  PATIENT CALLED REQUESTING MEDICINE FOR A UTI TO [facility] IN [place], [place]. PHONE NUMBER FOR THE PHARMACY IS [phone]

## 2017-07-03 RX ORDER — ESTRADIOL 2 MG/1
RING VAGINAL
Qty: 1 EACH | Refills: 0 | Status: SHIPPED | OUTPATIENT
Start: 2017-07-03 | End: 2017-10-01 | Stop reason: SDUPTHER

## 2017-07-25 ENCOUNTER — OFFICE VISIT (OUTPATIENT)
Dept: PSYCHIATRY | Facility: CLINIC | Age: 62
End: 2017-07-25

## 2017-07-25 VITALS
BODY MASS INDEX: 22.51 KG/M2 | HEART RATE: 68 BPM | DIASTOLIC BLOOD PRESSURE: 78 MMHG | HEIGHT: 69 IN | WEIGHT: 152 LBS | SYSTOLIC BLOOD PRESSURE: 114 MMHG

## 2017-07-25 DIAGNOSIS — F41.1 GENERALIZED ANXIETY DISORDER: Primary | ICD-10-CM

## 2017-07-25 DIAGNOSIS — F33.0 MILD EPISODE OF RECURRENT MAJOR DEPRESSIVE DISORDER (HCC): ICD-10-CM

## 2017-07-25 PROCEDURE — 99213 OFFICE O/P EST LOW 20 MIN: CPT | Performed by: PSYCHIATRY & NEUROLOGY

## 2017-07-25 NOTE — PROGRESS NOTES
"PROGRESS NOTE    CC: Depression and anxiety    HX: The patient reports overall her mood has been good.  She admits to being anxious and worries about her family in particular.  Her oldest son and his family continued to avoid contact with her.  She continues to worry as to why.  She is accepting this but continues to reach out.  She is happy that she is getting distended more time with another granddaughter.  She also expressed thanks for her relationship with her other children.  If she continues to worry about her weight frustrated that she has not lost more.  She is staying active.  The over the weekend, she dropped a rock on her left leg which has been painful and she was using a crutch today.  Sleep continues to be poor.  Poor initiation and maintenance.  She estimated getting approximately 3-4 hours of sleep. The patient also reports she has more difficulty remembering  Things such as the past medication she had been on.  I reviewed the chart and in the past she reported no improvement with trazodone, doxepin, amitriptyline, Ambien, Lunesta.  Patient tolerated stopping Zoloft without rebound anxiety.    I have reviewed pt's allergies and current medications.     Review of Systems   Constitutional: Negative.    Cardiovascular: Negative.    Gastrointestinal: Negative.    Musculoskeletal:        Left leg pain due to injury   Neurological: Negative.      /78  Pulse 68  Ht 69\" (175.3 cm)  Wt 152 lb (68.9 kg)  BMI 22.45 kg/m2    EXAM: PE: Oriented ×4, no acute distress,bruising on the medial aspect of the left leg just below the knee. using a crutch today.  Neatly, casually dressed, good hygeine. Gait and station stable. No psychomotor agitation/retardation. No motor tics Speech is normal rate, amount. Associations intact. Mood \"Okay\" affect slightly anxious.. TC-goal directed.  Denies SI/HI/VH/AH. TP-linear.  Attention and concentration are fair. Memory is intact for recent and remote events. " Insight-limited, judgement-fair      Encounter Diagnoses   Name Primary?   • Generalized anxiety disorder Yes   • Mild episode of recurrent major depressive disorder        Current Outpatient Prescriptions   Medication Sig Dispense Refill   • ESTRING 2 MG vaginal ring INSERT IN THE VAGINA AS DIRECTED 1 each 0   • LORazepam (ATIVAN) 1 MG tablet Take 1 tablet by mouth Every Night. 90 tablet 0   • terconazole (TERAZOL 7) 0.4 % vaginal cream Insert 1 applicator q hs x 7 noc 1 each 0   • triamterene-hydrochlorothiazide (MAXZIDE-25) 37.5-25 MG per tablet Take 1 tablet by mouth Daily. 90 tablet 3   • triamterene-hydrochlorothiazide (MAXZIDE-25) 37.5-25 MG per tablet TAKE 1 TABLET BY MOUTH DAILY. 90 tablet 3     No current facility-administered medications for this visit.      Plan:  1. Change Ativan to Klonopin to see if this helps with sleep.  Start Klonopin 1 mg nightly for sleep  2. RTC 3 months        The risks, benefits, and treatment alternatives were discussed with the patient.     TIME IN:231PM TIME OUT:250PM

## 2017-10-02 RX ORDER — ESTRADIOL 2 MG/1
RING VAGINAL
Qty: 1 EACH | Refills: 0 | Status: SHIPPED | OUTPATIENT
Start: 2017-10-02 | End: 2017-12-12 | Stop reason: ALTCHOICE

## 2017-10-24 ENCOUNTER — OFFICE VISIT (OUTPATIENT)
Dept: PSYCHIATRY | Facility: CLINIC | Age: 62
End: 2017-10-24

## 2017-10-24 DIAGNOSIS — F33.2 SEVERE EPISODE OF RECURRENT MAJOR DEPRESSIVE DISORDER, WITHOUT PSYCHOTIC FEATURES (HCC): ICD-10-CM

## 2017-10-24 DIAGNOSIS — F41.1 GENERALIZED ANXIETY DISORDER: Primary | ICD-10-CM

## 2017-10-24 PROCEDURE — 99213 OFFICE O/P EST LOW 20 MIN: CPT | Performed by: PSYCHIATRY & NEUROLOGY

## 2017-10-24 NOTE — PROGRESS NOTES
"Outpatient Psychiatric Progress Note    CC: f/u INDIRA    HX:  Leticia was seen for follow-up of generalized anxiety disorder.  Over the last several days, she has had several physical problems that have increased her anxiety.  She crushed her hand which required sutures and earlier today one of the sutures busted open.  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  She is also had to use her other side of her body to hold children and now her right hip is \"spasming.\"  She repeatedly said that she needs to be productive and to fill a need for others.  There is also an incident several weeks ago where a family friend with spina bifida became very ill to the point of near death due to sepsis.  She and her family immediately drove to [place] to be with them.  For 2 weeks while this individual was in the hospital, she had poor sleep, worried incessantly about the individual, and had panic attacks during the day and night.  Once her condition improved, the anxiety level decreased.  She says currently she can have a good day and the very next day have a very bad day.  Sleep has been poor over the last several weeks and she is been taking the Ativan intermittently usually only a half a tablet.  The patient did mention briefly that she has accepted that her anxiety and stress is not a lapse in her spiritual life which she recognizes as an improvement.  She denies major depressive symptoms other than sleep disturbances.  No suicidal or homicidal thoughts.  Anxiety rating \"up and down\"   Appetite-fair   Energy level- \"tired\"  Sleep-poor initiation and early morning awakening    I have reviewed pt's allergies and current medications.     Review of Systems   Constitutional: Negative.    HENT: Negative.         \"buzzing\" in left ear, worsened   Respiratory: Negative.    Cardiovascular: Negative.    Gastrointestinal: Negative.    Musculoskeletal:        Right had laceration  Right hip " "spasm   Neurological: Negative.    Psychiatric/Behavioral: Positive for dysphoric mood. The patient is nervous/anxious.      There were no vitals taken for this visit.    EXAM: Neatly, casually dressed, good hygeine. Gait and station stable. No psychomotor agitation/retardation. No motor tics Speech is normal rate, amount. Associations intact. Mood \"tired\" affect anxious stable.. TC-goal directed.  Denies SI/HI/VH/AH. TP-linear.  Attention and concentration are fair. Memory is intact for recent and remote events. Insight- limited, judgement-fair      Encounter Diagnoses   Name Primary?   • Generalized anxiety disorder Yes   • Severe episode of recurrent major depressive disorder, without psychotic features        Current Outpatient Prescriptions   Medication Sig Dispense Refill   • cephalexin (KEFLEX) 500 MG capsule Take 1 capsule by mouth 4 (Four) Times a Day for 7 days. 28 capsule 0   • ESTRING 2 MG vaginal ring INSERT IN THE VAGINA AS DIRECTED 1 each 0   • LORazepam (ATIVAN) 1 MG tablet Take 1 tablet by mouth Every Night. 90 tablet 0   • terconazole (TERAZOL 7) 0.4 % vaginal cream Insert 1 applicator q hs x 7 noc 1 each 0   • triamterene-hydrochlorothiazide (MAXZIDE-25) 37.5-25 MG per tablet Take 1 tablet by mouth Daily. 90 tablet 3   • triamterene-hydrochlorothiazide (MAXZIDE-25) 37.5-25 MG per tablet TAKE 1 TABLET BY MOUTH DAILY. 90 tablet 3     No current facility-administered medications for this visit.      Plan:  1. Continue Ativan 1 mg nightly as needed for sleep and anxiety.  Discussed she could relates this to 1.5 mg for a few nights in order to get her sleep back on track if needed.  2. RTC 3months  3.  Follow-up with primary care tomorrow on hand and hip issues    The risks, benefits, and treatment alternatives were discussed with the patient.     TIME IN:444P  TIME OUT:5;05PM  "

## 2017-10-27 ENCOUNTER — APPOINTMENT (OUTPATIENT)
Dept: CT IMAGING | Facility: HOSPITAL | Age: 62
End: 2017-10-27

## 2017-10-27 ENCOUNTER — APPOINTMENT (OUTPATIENT)
Dept: GENERAL RADIOLOGY | Facility: HOSPITAL | Age: 62
End: 2017-10-27

## 2017-10-27 ENCOUNTER — HOSPITAL ENCOUNTER (EMERGENCY)
Facility: HOSPITAL | Age: 62
Discharge: HOME | End: 2017-10-27
Attending: EMERGENCY MEDICINE | Admitting: EMERGENCY MEDICINE

## 2017-10-27 VITALS
BODY MASS INDEX: 21.92 KG/M2 | SYSTOLIC BLOOD PRESSURE: 135 MMHG | WEIGHT: 148 LBS | TEMPERATURE: 97.8 F | OXYGEN SATURATION: 98 % | DIASTOLIC BLOOD PRESSURE: 95 MMHG | RESPIRATION RATE: 17 BRPM | HEIGHT: 69 IN | HEART RATE: 70 BPM

## 2017-10-27 DIAGNOSIS — S20.211A RIB CONTUSION, RIGHT, INITIAL ENCOUNTER: Primary | ICD-10-CM

## 2017-10-27 LAB
ALBUMIN SERPL-MCNC: 4.3 G/DL (ref 3.5–5)
ALBUMIN/GLOB SERPL: 1.4 G/DL (ref 1–2)
ALP SERPL-CCNC: 67 U/L (ref 38–126)
ALT SERPL W P-5'-P-CCNC: 29 U/L (ref 13–69)
ANION GAP SERPL CALCULATED.3IONS-SCNC: 13.1 MMOL/L
AST SERPL-CCNC: 24 U/L (ref 15–46)
BASOPHILS # BLD AUTO: 0.06 10*3/MM3 (ref 0–0.2)
BASOPHILS NFR BLD AUTO: 0.9 % (ref 0–2.5)
BILIRUB SERPL-MCNC: 0.7 MG/DL (ref 0.2–1.3)
BUN BLD-MCNC: 13 MG/DL (ref 7–20)
BUN/CREAT SERPL: 18.6 (ref 7.1–23.5)
CALCIUM SPEC-SCNC: 9.5 MG/DL (ref 8.4–10.2)
CHLORIDE SERPL-SCNC: 106 MMOL/L (ref 98–107)
CO2 SERPL-SCNC: 28 MMOL/L (ref 26–30)
CREAT BLD-MCNC: 0.7 MG/DL (ref 0.6–1.3)
DEPRECATED RDW RBC AUTO: 40.3 FL (ref 37–54)
EOSINOPHIL # BLD AUTO: 0.08 10*3/MM3 (ref 0–0.7)
EOSINOPHIL NFR BLD AUTO: 1.2 % (ref 0–7)
ERYTHROCYTE [DISTWIDTH] IN BLOOD BY AUTOMATED COUNT: 12.3 % (ref 11.5–14.5)
GFR SERPL CREATININE-BSD FRML MDRD: 85 ML/MIN/1.73
GLOBULIN UR ELPH-MCNC: 3 GM/DL
GLUCOSE BLD-MCNC: 88 MG/DL (ref 74–98)
HCT VFR BLD AUTO: 42.2 % (ref 37–47)
HGB BLD-MCNC: 14.1 G/DL (ref 12–16)
IMM GRANULOCYTES # BLD: 0.01 10*3/MM3 (ref 0–0.06)
IMM GRANULOCYTES NFR BLD: 0.2 % (ref 0–0.6)
LIPASE SERPL-CCNC: 99 U/L (ref 23–300)
LYMPHOCYTES # BLD AUTO: 1.78 10*3/MM3 (ref 0.6–3.4)
LYMPHOCYTES NFR BLD AUTO: 27.3 % (ref 10–50)
MCH RBC QN AUTO: 29.9 PG (ref 27–31)
MCHC RBC AUTO-ENTMCNC: 33.4 G/DL (ref 30–37)
MCV RBC AUTO: 89.4 FL (ref 81–99)
MONOCYTES # BLD AUTO: 0.42 10*3/MM3 (ref 0–0.9)
MONOCYTES NFR BLD AUTO: 6.5 % (ref 0–12)
NEUTROPHILS # BLD AUTO: 4.16 10*3/MM3 (ref 2–6.9)
NEUTROPHILS NFR BLD AUTO: 63.9 % (ref 37–80)
NRBC BLD MANUAL-RTO: 0 /100 WBC (ref 0–0)
PLATELET # BLD AUTO: 297 10*3/MM3 (ref 130–400)
PMV BLD AUTO: 9.5 FL (ref 6–12)
POTASSIUM BLD-SCNC: 4.1 MMOL/L (ref 3.5–5.1)
PROT SERPL-MCNC: 7.3 G/DL (ref 6.3–8.2)
RBC # BLD AUTO: 4.72 10*6/MM3 (ref 4.2–5.4)
SODIUM BLD-SCNC: 143 MMOL/L (ref 137–145)
WBC NRBC COR # BLD: 6.51 10*3/MM3 (ref 4.8–10.8)

## 2017-10-27 PROCEDURE — 99284 EMERGENCY DEPT VISIT MOD MDM: CPT

## 2017-10-27 PROCEDURE — 85025 COMPLETE CBC W/AUTO DIFF WBC: CPT | Performed by: NURSE PRACTITIONER

## 2017-10-27 PROCEDURE — 74177 CT ABD & PELVIS W/CONTRAST: CPT

## 2017-10-27 PROCEDURE — 0 IOPAMIDOL 61 % SOLUTION: Performed by: EMERGENCY MEDICINE

## 2017-10-27 PROCEDURE — 83690 ASSAY OF LIPASE: CPT | Performed by: NURSE PRACTITIONER

## 2017-10-27 PROCEDURE — 71101 X-RAY EXAM UNILAT RIBS/CHEST: CPT

## 2017-10-27 PROCEDURE — 80053 COMPREHEN METABOLIC PANEL: CPT | Performed by: NURSE PRACTITIONER

## 2017-10-27 RX ORDER — HYDROCODONE BITARTRATE AND ACETAMINOPHEN 5; 325 MG/1; MG/1
2 TABLET ORAL ONCE
Status: COMPLETED | OUTPATIENT
Start: 2017-10-27 | End: 2017-10-27

## 2017-10-27 RX ORDER — MELOXICAM 7.5 MG/1
7.5 TABLET ORAL DAILY
Qty: 14 TABLET | Refills: 0 | Status: SHIPPED | OUTPATIENT
Start: 2017-10-27 | End: 2017-12-12 | Stop reason: SDDI

## 2017-10-27 RX ADMIN — IOPAMIDOL 100 ML: 612 INJECTION, SOLUTION INTRAVENOUS at 11:00

## 2017-10-27 RX ADMIN — HYDROCODONE BITARTRATE AND ACETAMINOPHEN 2 TABLET: 5; 325 TABLET ORAL at 10:14

## 2017-10-27 NOTE — ED PROVIDER NOTES
Subjective   History of Present Illness  This is a 61-year-old female who comes in today complaining of right rib and right upper quadrant pain.  She has a recent injury to her right arm and she has it in a splint and was in the bathtub trying to get out when her arm slipped and she hit her right upper quadrant and right ribs to the side of the bathtub last night.  Since that time she's complained of severe pain to her right side she is having a hard time moving and taking a deep breath.  She is only taking ibuprofen over-the-counter for this pain.  Review of Systems   Constitutional: Negative.    HENT: Negative.    Eyes: Negative.    Respiratory: Negative.    Cardiovascular: Negative.    Gastrointestinal: Positive for abdominal pain.   Genitourinary: Negative.    Musculoskeletal: Positive for myalgias.   Skin: Negative.    Neurological: Negative.    Psychiatric/Behavioral: Negative.    All other systems reviewed and are negative.      Past Medical History:   Diagnosis Date   • Anxiety    • Asthma     AS CHILD   • Colonic polyp    • Depression    • Diverticulosis    • Dyslipidemia    • Electrocardiogram finding, abnormal, without diagnosis    • Elevated LFTs    • Esophageal reflux    • Fall    • Female pelvic pain    • Fibromyalgia    • Fracture    • GERD (gastroesophageal reflux disease)    • Gestational diabetes    • H/O mammogram    • Headache    • History of blood transfusion    • History of Holter monitoring     Findings were normal..    • Hypertension    • Insomnia    • Joint pain    • Migraine    • Osteoarthritis    • Ovarian cyst    • Pain in joint of left hip    • Recurrent UTI (urinary tract infection)    • Rheumatic fever    • Shoulder sprain    • Swelling of ankle joint    • Tinnitus    • Vaginitis    • Vitamin B12 deficiency    • Vitamin D deficiency disease        Allergies   Allergen Reactions   • Clarithromycin    • Gabapentin    • Nitrofurantoin Nausea Only   • Penicillins    • Risperidone    •  Sulfa Antibiotics    • Sulfamethoxazole-Trimethoprim    • Zyprexa [Olanzapine] Other (See Comments)     Extreme agitation   • Ciprofloxacin Rash       Past Surgical History:   Procedure Laterality Date   • ABDOMINAL SURGERY     • APPENDECTOMY     • CHOLECYSTECTOMY     • COLONOSCOPY      08/06/2016 DR. RAY   • DILATION AND CURETTAGE, DIAGNOSTIC / THERAPEUTIC     • ELECTROCONVULSIVE THERAPY Bilateral 10/6/2016    Procedure: BIFRONTAL ELECTROCONVULSIVE THERAPY;  Surgeon: Omi Foss MD;  Location: [facility] OR;  Service:    • ELECTROCONVULSIVE THERAPY Bilateral 10/11/2016    Procedure: BIFRONTAL ELECTROCONVULSIVE THERAPY;  Surgeon: Omi Foss MD;  Location: [facility] OR;  Service:    • ELECTROCONVULSIVE THERAPY Bilateral 10/17/2016    Procedure: BIFRONTAL ELECTROCONVULSIVE THERAPY;  Surgeon: Omi Foss MD;  Location: [facility] OR;  Service:    • ELECTROCONVULSIVE THERAPY Bilateral 10/28/2016    Procedure: ELECTROCONVULSIVE THERAPY;  Surgeon: Omi Foss MD;  Location: [facility] OR;  Service:    • ELECTROCONVULSIVE THERAPY Bilateral 11/1/2016    Procedure: ELECTROCONVULSIVE THERAPY;  Surgeon: Omi Foss MD;  Location: [facility] OR;  Service:    • ELECTROCONVULSIVE THERAPY Bilateral 11/3/2016    Procedure: ELECTROCONVULSIVE THERAPY;  Surgeon: Omi Foss MD;  Location: [facility] OR;  Service:    • FRACTURE SURGERY     • HIP SURGERY      LEFT HIP REVISION DONOR BONE 06/03/2014   • JOINT REPLACEMENT     • TONSILLECTOMY     • TOTAL HIP ARTHROPLASTY Bilateral     LEFT-2010   RIGHT-2003   • TOTAL HIP ARTHROPLASTY  2004,2011,2015   • WRIST SURGERY         Family History   Problem Relation Age of Onset   • Arthritis Mother    • Ovarian cancer Mother    • Stomach cancer Mother    • Migraines Mother    • Mental illness Mother    • Depression Mother    • Cancer Mother    • Arthritis Father    • Seizures Father      EPILEPSY   • Hypertension Father    • Osteoporosis  Father    • Mental illness Father    • Hyperlipidemia Father    • Stroke Father    • Depression Father    • Arthritis Sister    • Cancer Sister    • Migraines Sister    • Breast cancer Sister    • Cancer Other    • Diabetes Other    • Arthritis Other    • ADD / ADHD Neg Hx    • Alcohol abuse Neg Hx    • Anxiety disorder Neg Hx    • Bipolar disorder Neg Hx    • Dementia Neg Hx    • Drug abuse Neg Hx    • Paranoid behavior Neg Hx    • Schizophrenia Neg Hx    • Suicide Attempts Neg Hx        Social History     Social History   • Marital status:      Spouse name: N/A   • Number of children: N/A   • Years of education: N/A     Social History Main Topics   • Smoking status: Never Smoker   • Smokeless tobacco: Never Used   • Alcohol use No   • Drug use: No   • Sexual activity: Yes     Partners: Male     Other Topics Concern   • None     Social History Narrative           Objective   Physical Exam   Constitutional: She appears well-developed and well-nourished.   Nursing note and vitals reviewed.  GEN: No acute distress  Head: Normocephalic, atraumatic  Eyes: Pupils equal round reactive to light  ENT: Posterior pharynx normal in appearance, oral mucosa is moist  Chest: tender to palpation to right lower ribs.  Cardiovascular: Regular rate  Lungs: Clear to auscultation bilaterally  Abdomen: Soft, tender right upper quadrant, nondistended, no peritoneal signs  Extremities: No edema, normal appearance  Neuro: GCS 15  Psych: Mood and affect are appropriate      Procedures         ED Course  ED Course                  MDM  Number of Diagnoses or Management Options     Amount and/or Complexity of Data Reviewed  Clinical lab tests: ordered and reviewed  Tests in the radiology section of CPT®: ordered  Discuss the patient with other providers: yes    Risk of Complications, Morbidity, and/or Mortality  Presenting problems: moderate  Diagnostic procedures: moderate  Management options: moderate        Final diagnoses:   Rib  contusion, right, initial encounter            Brooke Pierre, APRN  10/27/17 4610

## 2017-11-03 ENCOUNTER — TRANSCRIBE ORDERS (OUTPATIENT)
Dept: ADMINISTRATIVE | Facility: HOSPITAL | Age: 62
End: 2017-11-03

## 2017-11-03 DIAGNOSIS — M25.552 LEFT HIP PAIN: Primary | ICD-10-CM

## 2017-11-08 ENCOUNTER — HOSPITAL ENCOUNTER (OUTPATIENT)
Dept: NUCLEAR MEDICINE | Facility: HOSPITAL | Age: 62
Discharge: HOME | End: 2017-11-08

## 2017-11-08 DIAGNOSIS — M25.552 LEFT HIP PAIN: ICD-10-CM

## 2017-11-08 PROCEDURE — 0 TECHNETIUM MEDRONATE KIT: Performed by: ORTHOPAEDIC SURGERY

## 2017-11-08 PROCEDURE — 78315 BONE IMAGING 3 PHASE: CPT

## 2017-11-08 PROCEDURE — A9503 TC99M MEDRONATE: HCPCS | Performed by: ORTHOPAEDIC SURGERY

## 2017-11-08 RX ORDER — TC 99M MEDRONATE 20 MG/10ML
26.5 INJECTION, POWDER, LYOPHILIZED, FOR SOLUTION INTRAVENOUS
Status: COMPLETED | OUTPATIENT
Start: 2017-11-08 | End: 2017-11-08

## 2017-11-08 RX ADMIN — TC 99M MEDRONATE 26.5 MILLICURIE: 20 INJECTION, POWDER, LYOPHILIZED, FOR SOLUTION INTRAVENOUS at 10:20

## 2017-12-12 ENCOUNTER — OFFICE VISIT (OUTPATIENT)
Dept: FAMILY MEDICINE CLINIC | Facility: CLINIC | Age: 62
End: 2017-12-12

## 2017-12-12 VITALS
BODY MASS INDEX: 22.3 KG/M2 | DIASTOLIC BLOOD PRESSURE: 74 MMHG | HEART RATE: 74 BPM | TEMPERATURE: 97.6 F | OXYGEN SATURATION: 99 % | SYSTOLIC BLOOD PRESSURE: 114 MMHG | RESPIRATION RATE: 16 BRPM | WEIGHT: 151 LBS

## 2017-12-12 DIAGNOSIS — K21.00 GASTROESOPHAGEAL REFLUX DISEASE WITH ESOPHAGITIS: ICD-10-CM

## 2017-12-12 DIAGNOSIS — S22.41XA CLOSED FRACTURE OF MULTIPLE RIBS OF RIGHT SIDE, INITIAL ENCOUNTER: ICD-10-CM

## 2017-12-12 DIAGNOSIS — R10.13 EPIGASTRIC PAIN: Primary | ICD-10-CM

## 2017-12-12 DIAGNOSIS — I10 BENIGN ESSENTIAL HYPERTENSION: ICD-10-CM

## 2017-12-12 PROCEDURE — 99214 OFFICE O/P EST MOD 30 MIN: CPT | Performed by: INTERNAL MEDICINE

## 2017-12-12 RX ORDER — GUAIFENESIN 600 MG/1
1200 TABLET, EXTENDED RELEASE ORAL 2 TIMES DAILY
COMMUNITY
End: 2020-07-23

## 2017-12-12 RX ORDER — OMEPRAZOLE 20 MG/1
20 CAPSULE, DELAYED RELEASE ORAL 2 TIMES DAILY
COMMUNITY
End: 2018-07-31

## 2017-12-12 RX ORDER — IBUPROFEN 200 MG
200 TABLET ORAL EVERY 6 HOURS PRN
COMMUNITY
End: 2020-07-23

## 2017-12-12 NOTE — PROGRESS NOTES
Subjective   Leticia Arreguin is a 61 y.o. female.     Chief Complaint   Patient presents with   • Hypertension   • Pain   • Abdominal Pain   • Heartburn       History of Present Illness   Patient did follow-up on the blood pressure.  She takes her medications as prescribed ,She fell and had rib fractures on right side and went to the  Er and she went to the er  And the er notes have been reviewed , she cannot take nsaids , she complains of epigastric pain and has reflux, she stopped omeprazole as she states it causes memory loss , she went to [facility] and saw the orthopedist and they gave her tramadol  And she had a bone scan .  She continues to have pain in the right lower rib cage area    The following portions of the patient's history were reviewed and updated as appropriate: allergies, current medications, past family history, past medical history, past social history, past surgical history and problem list.    Review of Systems   Constitutional: Negative for appetite change, fatigue and fever.   HENT: Negative for congestion, ear discharge, ear pain, sinus pressure and sore throat.    Eyes: Negative for pain and discharge.   Respiratory: Negative for cough, chest tightness, shortness of breath and wheezing.         Right rib pain   Cardiovascular: Negative for chest pain, palpitations and leg swelling.   Gastrointestinal: Positive for abdominal pain. Negative for blood in stool, constipation, diarrhea and nausea.        Reflux     Endocrine: Negative for cold intolerance and heat intolerance.   Genitourinary: Negative for dysuria, flank pain and frequency.   Musculoskeletal: Negative for back pain and joint swelling.   Skin: Negative for color change.   Allergic/Immunologic: Negative for environmental allergies and food allergies.   Neurological: Negative for dizziness, weakness, numbness and headaches.   Hematological: Negative for adenopathy. Does not bruise/bleed easily.    Psychiatric/Behavioral: Negative for behavioral problems and dysphoric mood. The patient is not nervous/anxious.          Current Outpatient Prescriptions:   •  Glucosamine-Chondroitin (OSTEO BI-FLEX REGULAR STRENGTH PO), Take  by mouth., Disp: , Rfl:   •  guaiFENesin (MUCINEX) 600 MG 12 hr tablet, Take 1,200 mg by mouth 2 (Two) Times a Day., Disp: , Rfl:   •  ibuprofen (ADVIL,MOTRIN) 200 MG tablet, Take 200 mg by mouth Every 6 (Six) Hours As Needed for Mild Pain ., Disp: , Rfl:   •  LORazepam (ATIVAN) 1 MG tablet, Take 1 tablet by mouth Every Night., Disp: 90 tablet, Rfl: 0  •  Magnesium 100 MG capsule, Take  by mouth., Disp: , Rfl:   •  omeprazole (priLOSEC) 20 MG capsule, Take 20 mg by mouth 2 (Two) Times a Day., Disp: , Rfl:   •  Probiotic Product (PROBIOTIC-10) capsule, Take  by mouth., Disp: , Rfl:   •  triamterene-hydrochlorothiazide (MAXZIDE-25) 37.5-25 MG per tablet, TAKE 1 TABLET BY MOUTH DAILY., Disp: 90 tablet, Rfl: 3    Objective     Blood pressure 114/74, pulse 74, temperature 97.6 °F (36.4 °C), temperature source Oral, resp. rate 16, weight 68.5 kg (151 lb), SpO2 99 %, not currently breastfeeding.    Physical Exam   Constitutional: She is oriented to person, place, and time. She appears well-developed and well-nourished. No distress.   HENT:   Head: Normocephalic and atraumatic.   Right Ear: External ear normal.   Left Ear: External ear normal.   Nose: Nose normal.   Mouth/Throat: Oropharynx is clear and moist.   Eyes: Conjunctivae and EOM are normal. Pupils are equal, round, and reactive to light.   Neck: Neck supple. No thyromegaly present.   Cardiovascular: Normal rate, regular rhythm and normal heart sounds.    Pulmonary/Chest: Effort normal and breath sounds normal. No respiratory distress.   Right lower rib cage reproducible tenderness   Abdominal: Soft. Bowel sounds are normal. She exhibits no distension. There is no tenderness. There is no rebound.   Musculoskeletal: Normal range of motion.  She exhibits no edema.   Lymphadenopathy:     She has no cervical adenopathy.   Neurological: She is alert and oriented to person, place, and time.   No gross motor or sensory deficits   Skin: Skin is warm. She is not diaphoretic.   Psychiatric: She has a normal mood and affect.   Nursing note and vitals reviewed.      Results for orders placed or performed during the hospital encounter of 10/27/17   Comprehensive Metabolic Panel   Result Value Ref Range    Glucose 88 74 - 98 mg/dL    BUN 13 7 - 20 mg/dL    Creatinine 0.70 0.60 - 1.30 mg/dL    Sodium 143 137 - 145 mmol/L    Potassium 4.1 3.5 - 5.1 mmol/L    Chloride 106 98 - 107 mmol/L    CO2 28.0 26.0 - 30.0 mmol/L    Calcium 9.5 8.4 - 10.2 mg/dL    Total Protein 7.3 6.3 - 8.2 g/dL    Albumin 4.30 3.50 - 5.00 g/dL    ALT (SGPT) 29 13 - 69 U/L    AST (SGOT) 24 15 - 46 U/L    Alkaline Phosphatase 67 38 - 126 U/L    Total Bilirubin 0.7 0.2 - 1.3 mg/dL    eGFR Non African Amer 85 >60 mL/min/1.73    Globulin 3.0 gm/dL    A/G Ratio 1.4 1.0 - 2.0 g/dL    BUN/Creatinine Ratio 18.6 7.1 - 23.5    Anion Gap 13.1 mmol/L   Lipase   Result Value Ref Range    Lipase 99 23 - 300 U/L   CBC Auto Differential   Result Value Ref Range    WBC 6.51 4.80 - 10.80 10*3/mm3    RBC 4.72 4.20 - 5.40 10*6/mm3    Hemoglobin 14.1 12.0 - 16.0 g/dL    Hematocrit 42.2 37.0 - 47.0 %    MCV 89.4 81.0 - 99.0 fL    MCH 29.9 27.0 - 31.0 pg    MCHC 33.4 30.0 - 37.0 g/dL    RDW 12.3 11.5 - 14.5 %    RDW-SD 40.3 37.0 - 54.0 fl    MPV 9.5 6.0 - 12.0 fL    Platelets 297 130 - 400 10*3/mm3    Neutrophil % 63.9 37.0 - 80.0 %    Lymphocyte % 27.3 10.0 - 50.0 %    Monocyte % 6.5 0.0 - 12.0 %    Eosinophil % 1.2 0.0 - 7.0 %    Basophil % 0.9 0.0 - 2.5 %    Immature Grans % 0.2 0.0 - 0.6 %    Neutrophils, Absolute 4.16 2.00 - 6.90 10*3/mm3    Lymphocytes, Absolute 1.78 0.60 - 3.40 10*3/mm3    Monocytes, Absolute 0.42 0.00 - 0.90 10*3/mm3    Eosinophils, Absolute 0.08 0.00 - 0.70 10*3/mm3    Basophils, Absolute  0.06 0.00 - 0.20 10*3/mm3    Immature Grans, Absolute 0.01 0.00 - 0.06 10*3/mm3    nRBC 0.0 0.0 - 0.0 /100 WBC         Assessment/Plan   Leticia was seen today for hypertension, pain, abdominal pain and heartburn.    Diagnoses and all orders for this visit:    Epigastric pain  -     Ambulatory Referral to Gastroenterology    Closed fracture of multiple ribs of right side, initial encounter  -     XR Ribs Right With PA Chest    Gastroesophageal reflux disease with esophagitis  -     Ambulatory Referral to Gastroenterology    Benign essential hypertension      Plan:  1.  Benign essential hypertension: Will continue current medication  2.  Epigastric pain: Will refer patient to GI.  She will need EGD  3.GERD: Patient advised to start Zantac, refer patient to GI for EGD  4.right rib fracture: We will repeat chest x-ray         Amanda David MD

## 2017-12-13 ENCOUNTER — HOSPITAL ENCOUNTER (OUTPATIENT)
Dept: GENERAL RADIOLOGY | Facility: HOSPITAL | Age: 62
Discharge: HOME | End: 2017-12-13
Admitting: INTERNAL MEDICINE

## 2017-12-13 PROCEDURE — 71101 X-RAY EXAM UNILAT RIBS/CHEST: CPT

## 2017-12-28 ENCOUNTER — TELEPHONE (OUTPATIENT)
Dept: PSYCHIATRY | Facility: CLINIC | Age: 62
End: 2017-12-28

## 2017-12-28 RX ORDER — LORAZEPAM 1 MG/1
TABLET ORAL
Qty: 90 TABLET | Refills: 0 | Status: SHIPPED | OUTPATIENT
Start: 2017-12-28 | End: 2018-01-19 | Stop reason: SDUPTHER

## 2017-12-29 NOTE — TELEPHONE ENCOUNTER
Sorry, the text is wrong; I did say she could increase her ativan to 1.5 mg nightly if needed.  Please send in another script for 1mg po daily as needed for anxiety #30, no refills

## 2018-01-02 RX ORDER — ESTRADIOL 2 MG/1
RING VAGINAL
Qty: 1 EACH | Refills: 0 | Status: SHIPPED | OUTPATIENT
Start: 2018-01-02 | End: 2018-04-05 | Stop reason: SDUPTHER

## 2018-01-11 ENCOUNTER — TRANSCRIBE ORDERS (OUTPATIENT)
Dept: BONE DENSITY | Facility: HOSPITAL | Age: 63
End: 2018-01-11

## 2018-01-11 DIAGNOSIS — Z78.0 POST-MENOPAUSAL: Primary | ICD-10-CM

## 2018-01-11 DIAGNOSIS — Z13.820 SCREENING FOR OSTEOPOROSIS: ICD-10-CM

## 2018-01-18 ENCOUNTER — APPOINTMENT (OUTPATIENT)
Dept: BONE DENSITY | Facility: HOSPITAL | Age: 63
End: 2018-01-18

## 2018-01-18 DIAGNOSIS — Z13.820 SCREENING FOR OSTEOPOROSIS: ICD-10-CM

## 2018-01-18 PROCEDURE — 77080 DXA BONE DENSITY AXIAL: CPT

## 2018-01-19 RX ORDER — LORAZEPAM 1 MG/1
1.5 TABLET ORAL NIGHTLY PRN
Qty: 45 TABLET | Refills: 0 | OUTPATIENT
Start: 2018-01-19 | End: 2018-01-22 | Stop reason: SDUPTHER

## 2018-01-22 RX ORDER — LORAZEPAM 1 MG/1
1.5 TABLET ORAL NIGHTLY PRN
Qty: 45 TABLET | Refills: 0 | OUTPATIENT
Start: 2018-01-22 | End: 2018-02-21

## 2018-01-24 ENCOUNTER — OFFICE VISIT (OUTPATIENT)
Dept: INTERNAL MEDICINE | Facility: CLINIC | Age: 63
End: 2018-01-24

## 2018-01-24 VITALS
HEART RATE: 64 BPM | DIASTOLIC BLOOD PRESSURE: 72 MMHG | OXYGEN SATURATION: 100 % | SYSTOLIC BLOOD PRESSURE: 107 MMHG | RESPIRATION RATE: 16 BRPM | BODY MASS INDEX: 23.04 KG/M2 | TEMPERATURE: 97.6 F | WEIGHT: 156 LBS

## 2018-01-24 DIAGNOSIS — N39.0 ACUTE UTI: Primary | ICD-10-CM

## 2018-01-24 DIAGNOSIS — I10 BENIGN ESSENTIAL HYPERTENSION: ICD-10-CM

## 2018-01-24 LAB
BILIRUB BLD-MCNC: NEGATIVE MG/DL
CLARITY, POC: ABNORMAL
COLOR UR: YELLOW
GLUCOSE UR STRIP-MCNC: NEGATIVE MG/DL
KETONES UR QL: NEGATIVE
LEUKOCYTE EST, POC: ABNORMAL
NITRITE UR-MCNC: NEGATIVE MG/ML
PH UR: 7 [PH] (ref 5–8)
PROT UR STRIP-MCNC: NEGATIVE MG/DL
RBC # UR STRIP: NEGATIVE /UL
SP GR UR: 1 (ref 1–1.03)
UROBILINOGEN UR QL: NORMAL

## 2018-01-24 PROCEDURE — 99213 OFFICE O/P EST LOW 20 MIN: CPT | Performed by: INTERNAL MEDICINE

## 2018-01-24 PROCEDURE — 81003 URINALYSIS AUTO W/O SCOPE: CPT | Performed by: INTERNAL MEDICINE

## 2018-01-24 RX ORDER — RANITIDINE HCL 75 MG
75 TABLET ORAL 2 TIMES DAILY
COMMUNITY
End: 2018-11-01

## 2018-01-24 RX ORDER — DOXYCYCLINE 100 MG/1
100 CAPSULE ORAL EVERY 12 HOURS SCHEDULED
Qty: 20 CAPSULE | Refills: 0 | Status: SHIPPED | OUTPATIENT
Start: 2018-01-24 | End: 2018-02-03

## 2018-01-24 NOTE — PROGRESS NOTES
Subjective   Leticia Arreguin is a 62 y.o. female.     Chief Complaint   Patient presents with   • Urinary Tract Infection   • Urinary Frequency   • Hypertension       History of Present Illness   Patient is here to follow up  on the  blood pressure and cholesterol  The patient  is taking the blood pressure  medications as prescribed and  has had no side effects.  Patient is also here complaining of problems urinating with burning sensation and frequency and states that she she has a UTI    The following portions of the patient's history were reviewed and updated as appropriate: allergies, current medications, past family history, past medical history, past social history, past surgical history and problem list.    Review of Systems   Constitutional: Negative for appetite change, fatigue and fever.   HENT: Negative for congestion, ear discharge, ear pain, sinus pressure and sore throat.    Eyes: Negative for pain and discharge.   Respiratory: Negative for cough, chest tightness, shortness of breath and wheezing.    Cardiovascular: Negative for chest pain, palpitations and leg swelling.   Gastrointestinal: Negative for abdominal pain, blood in stool, constipation, diarrhea and nausea.   Endocrine: Negative for cold intolerance and heat intolerance.   Genitourinary: Positive for dysuria and frequency. Negative for flank pain.   Musculoskeletal: Negative for back pain and joint swelling.   Skin: Negative for color change.   Allergic/Immunologic: Negative for environmental allergies and food allergies.   Neurological: Negative for dizziness, weakness, numbness and headaches.   Hematological: Negative for adenopathy. Does not bruise/bleed easily.   Psychiatric/Behavioral: Negative for behavioral problems and dysphoric mood. The patient is not nervous/anxious.          Current Outpatient Prescriptions:   •  ESTRING 2 MG vaginal ring, INSERT IN THE VAGINA AS DIRECTED, Disp: 1 each, Rfl: 0  •  Glucosamine-Chondroitin  (OSTEO BI-FLEX REGULAR STRENGTH PO), Take  by mouth., Disp: , Rfl:   •  guaiFENesin (MUCINEX) 600 MG 12 hr tablet, Take 1,200 mg by mouth 2 (Two) Times a Day., Disp: , Rfl:   •  ibuprofen (ADVIL,MOTRIN) 200 MG tablet, Take 200 mg by mouth Every 6 (Six) Hours As Needed for Mild Pain ., Disp: , Rfl:   •  LORazepam (ATIVAN) 1 MG tablet, Take 1.5 tablets by mouth At Night As Needed for Anxiety for up to 30 days., Disp: 45 tablet, Rfl: 0  •  Magnesium 100 MG capsule, Take  by mouth., Disp: , Rfl:   •  omeprazole (priLOSEC) 20 MG capsule, Take 20 mg by mouth 2 (Two) Times a Day., Disp: , Rfl:   •  Probiotic Product (PROBIOTIC-10) capsule, Take  by mouth., Disp: , Rfl:   •  raNITIdine (ZANTAC) 75 MG tablet, Take 75 mg by mouth 2 (Two) Times a Day., Disp: , Rfl:   •  triamterene-hydrochlorothiazide (MAXZIDE-25) 37.5-25 MG per tablet, TAKE 1 TABLET BY MOUTH DAILY., Disp: 90 tablet, Rfl: 3  •  doxycycline (MONODOX) 100 MG capsule, Take 1 capsule by mouth Every 12 (Twelve) Hours for 10 days., Disp: 20 capsule, Rfl: 0    Objective     Blood pressure 107/72, pulse 64, temperature 97.6 °F (36.4 °C), temperature source Oral, resp. rate 16, weight 70.8 kg (156 lb), SpO2 100 %, not currently breastfeeding.    Physical Exam   Constitutional: She is oriented to person, place, and time. She appears well-developed and well-nourished. No distress.   HENT:   Head: Normocephalic and atraumatic.   Right Ear: External ear normal.   Left Ear: External ear normal.   Nose: Nose normal.   Mouth/Throat: Oropharynx is clear and moist.   Eyes: Conjunctivae and EOM are normal. Pupils are equal, round, and reactive to light.   Neck: Neck supple. No thyromegaly present.   Cardiovascular: Normal rate, regular rhythm and normal heart sounds.    Pulmonary/Chest: Effort normal and breath sounds normal. No respiratory distress.   Abdominal: Soft. Bowel sounds are normal. She exhibits no distension. There is no tenderness. There is no rebound.    Musculoskeletal: Normal range of motion. She exhibits no edema.   Lymphadenopathy:     She has no cervical adenopathy.   Neurological: She is alert and oriented to person, place, and time.   No gross motor or sensory deficits   Skin: Skin is warm. She is not diaphoretic.   Psychiatric: She has a normal mood and affect.   Nursing note and vitals reviewed.      Results for orders placed or performed in visit on 01/24/18   POCT urinalysis dipstick, automated   Result Value Ref Range    Color Yellow Yellow, Straw, Dark Yellow, Orin    Clarity, UA Cloudy (A) Clear    Glucose, UA Negative Negative, 1000 mg/dL (3+) mg/dL    Bilirubin Negative Negative    Ketones, UA Negative Negative    Specific Gravity  1.000 (A) 1.005 - 1.030    Blood, UA Negative Negative    pH, Urine 7.0 5.0 - 8.0    Protein, POC Negative Negative mg/dL    Urobilinogen, UA Normal Normal    Leukocytes 500 Genoveva/ul (A) Negative    Nitrite, UA Negative Negative         Assessment/Plan   Leticia was seen today for urinary tract infection, urinary frequency and hypertension.    Diagnoses and all orders for this visit:    Acute UTI  -     POCT urinalysis dipstick, automated  -     Urine Culture - Urine, Urine, Clean Catch    Benign essential hypertension    Other orders  -     doxycycline (MONODOX) 100 MG capsule; Take 1 capsule by mouth Every 12 (Twelve) Hours for 10 days.      Plan:  1.benign essential hypertension: Will continue current medication, low-sodium diet advise.  2. Acute   urinary tract infection: will get urine analysis in office and send urine culture and start oral antibiotics,    oral hydration advised         Amanda David MD

## 2018-01-26 LAB
BACTERIA UR CULT: NO GROWTH
BACTERIA UR CULT: NORMAL

## 2018-02-27 ENCOUNTER — TELEPHONE (OUTPATIENT)
Dept: PSYCHIATRY | Facility: CLINIC | Age: 63
End: 2018-02-27

## 2018-03-06 ENCOUNTER — OFFICE VISIT (OUTPATIENT)
Dept: PSYCHIATRY | Facility: CLINIC | Age: 63
End: 2018-03-06

## 2018-03-06 VITALS
HEIGHT: 69 IN | BODY MASS INDEX: 22.51 KG/M2 | WEIGHT: 152 LBS | DIASTOLIC BLOOD PRESSURE: 77 MMHG | HEART RATE: 77 BPM | SYSTOLIC BLOOD PRESSURE: 131 MMHG

## 2018-03-06 DIAGNOSIS — F33.2 SEVERE EPISODE OF RECURRENT MAJOR DEPRESSIVE DISORDER, WITHOUT PSYCHOTIC FEATURES (HCC): Primary | ICD-10-CM

## 2018-03-06 DIAGNOSIS — F41.1 GENERALIZED ANXIETY DISORDER: ICD-10-CM

## 2018-03-06 PROCEDURE — 99213 OFFICE O/P EST LOW 20 MIN: CPT | Performed by: PSYCHIATRY & NEUROLOGY

## 2018-03-06 RX ORDER — LORAZEPAM 1 MG/1
TABLET ORAL
Qty: 120 TABLET | Refills: 0
Start: 2018-03-06 | End: 2018-05-29 | Stop reason: SDUPTHER

## 2018-03-06 NOTE — PROGRESS NOTES
"Outpatient Psychiatric Progress Note    CC: f/u INDIRA    HX:  Leticia was seen for follow-up of generalized anxiety disorder.  She reports overall her anxiety level has been stable.  She tries to only use 1 mg of Ativan nightly but there are days where she takes 1-1/2.  She does not run out early.  Her mood has been stable and denies persistent depressed use.  She admits to having a depressed day here and they are.  Otherwise, the patient reports she keeps herself very busy in order to avoid some negative feelings.  No medication side effects.  No other changes at home.  Appetite-fair   Energy level- \"tired\"  Sleep- fair, disrupted at times    I have reviewed pt's allergies and current medications.     Review of Systems   Constitutional: Negative.    Respiratory: Negative.    Cardiovascular: Negative.    Gastrointestinal: Negative.    Neurological: Negative.    Psychiatric/Behavioral: Negative.      /77  Pulse 77  Ht 175.3 cm (69\")  Wt 68.9 kg (152 lb)  BMI 22.45 kg/m2    MENTAL STATUS EXAM:  Appearance:Neatly, casually dressed, good hygeine.   Cooperation:Cooperative  Orientation: Ox4  Gait and station stable.   Psychomotor: No psychomotor agitation/retardation, No EPS, No motor tics  Speech-normal rate, amount.  Mood \"tired\"   Affect-congruent/appropriate.  Thought Content-goal directed, no delusional material present  Thought process-linear, organized.  Suicidality: No SI  Homicidality: No HI  Perception: No AH/VH  Memory is intact for recent and remote events  Concentration: good  Impulse control-good  Insight- fair   Judgement-fair    Encounter Diagnoses   Name Primary?   • Severe episode of recurrent major depressive disorder, without psychotic features Yes   • Generalized anxiety disorder        Current Outpatient Prescriptions   Medication Sig Dispense Refill   • ESTRING 2 MG vaginal ring INSERT IN THE VAGINA AS DIRECTED 1 each 0   • Glucosamine-Chondroitin (OSTEO BI-FLEX REGULAR STRENGTH PO) Take  by " mouth.     • guaiFENesin (MUCINEX) 600 MG 12 hr tablet Take 1,200 mg by mouth 2 (Two) Times a Day.     • ibuprofen (ADVIL,MOTRIN) 200 MG tablet Take 200 mg by mouth Every 6 (Six) Hours As Needed for Mild Pain .     • LORazepam (ATIVAN) 1 MG tablet 1 to 1.5 tab po nightly 120 tablet 0   • Magnesium 100 MG capsule Take  by mouth.     • omeprazole (priLOSEC) 20 MG capsule Take 20 mg by mouth 2 (Two) Times a Day.     • Probiotic Product (PROBIOTIC-10) capsule Take  by mouth.     • raNITIdine (ZANTAC) 75 MG tablet Take 75 mg by mouth 2 (Two) Times a Day.     • triamterene-hydrochlorothiazide (MAXZIDE-25) 37.5-25 MG per tablet TAKE 1 TABLET BY MOUTH DAILY. 90 tablet 3     No current facility-administered medications for this visit.      Plan:  1. Continue Ativan 1-1/2 mg nightly  2. RTC 3months    TIME IN:440P  TIME OUT: 5:53 PM

## 2018-04-06 RX ORDER — ESTRADIOL 2 MG/1
RING VAGINAL
Qty: 1 EACH | Refills: 0 | Status: SHIPPED | OUTPATIENT
Start: 2018-04-06 | End: 2018-06-05 | Stop reason: SDUPTHER

## 2018-05-21 RX ORDER — TRIAMTERENE AND HYDROCHLOROTHIAZIDE 37.5; 25 MG/1; MG/1
TABLET ORAL
Qty: 90 TABLET | Refills: 3 | Status: SHIPPED | OUTPATIENT
Start: 2018-05-21 | End: 2018-11-01

## 2018-05-29 RX ORDER — LORAZEPAM 1 MG/1
TABLET ORAL
Qty: 120 TABLET | Refills: 0 | Status: SHIPPED | OUTPATIENT
Start: 2018-05-29 | End: 2018-07-02 | Stop reason: SDUPTHER

## 2018-06-05 ENCOUNTER — OFFICE VISIT (OUTPATIENT)
Dept: OBSTETRICS AND GYNECOLOGY | Facility: CLINIC | Age: 63
End: 2018-06-05

## 2018-06-05 VITALS
SYSTOLIC BLOOD PRESSURE: 110 MMHG | BODY MASS INDEX: 21.77 KG/M2 | DIASTOLIC BLOOD PRESSURE: 74 MMHG | WEIGHT: 147 LBS | HEIGHT: 69 IN

## 2018-06-05 DIAGNOSIS — Z80.41 FAMILY HISTORY OF OVARIAN CANCER: ICD-10-CM

## 2018-06-05 DIAGNOSIS — R10.2 PELVIC PAIN: Primary | ICD-10-CM

## 2018-06-05 DIAGNOSIS — R10.9 RIGHT FLANK PAIN: ICD-10-CM

## 2018-06-05 PROCEDURE — 99212 OFFICE O/P EST SF 10 MIN: CPT | Performed by: PHYSICIAN ASSISTANT

## 2018-06-05 RX ORDER — TRIAMTERENE AND HYDROCHLOROTHIAZIDE 37.5; 25 MG/1; MG/1
TABLET ORAL
COMMUNITY
End: 2018-06-05 | Stop reason: SDUPTHER

## 2018-06-05 RX ORDER — PHENAZOPYRIDINE HYDROCHLORIDE 200 MG/1
200 TABLET, FILM COATED ORAL 3 TIMES DAILY
Refills: 0 | COMMUNITY
Start: 2018-04-18 | End: 2018-11-01

## 2018-06-05 RX ORDER — LORAZEPAM 1 MG/1
TABLET ORAL
COMMUNITY
End: 2018-06-05 | Stop reason: SDUPTHER

## 2018-06-05 RX ORDER — CIPROFLOXACIN 500 MG/1
TABLET, FILM COATED ORAL EVERY 12 HOURS
COMMUNITY
End: 2018-11-01

## 2018-06-05 RX ORDER — TOPIRAMATE 25 MG/1
25 TABLET ORAL 2 TIMES DAILY
Refills: 5 | COMMUNITY
Start: 2018-03-12 | End: 2018-06-05 | Stop reason: SINTOL

## 2018-06-05 RX ORDER — FLUCONAZOLE 150 MG/1
TABLET ORAL
COMMUNITY
End: 2018-11-01

## 2018-06-05 NOTE — PROGRESS NOTES
"Subjective   Chief Complaint   Patient presents with   • Pelvic Pain     Right flank and pelvic pain       Leticia Arreguin is a 62 y.o. year old  presenting to be seen for complaint of right flank pain and pelvic pain starting last week.  She reports was started on Cipro and pyridium yesterday for UTI by her pcp Dr. Flores. She has not taken any pyridium yet.  She admits her main concern is fear of ovarian cancer as her mother  of ovarian cancer which was diagnosed in her early 70\"s.  There is also family history of breast cancer in her sister. Patient last mammogram was 2017 and she would like to schedule mammogram now.  She has nonspecific pelvic cramping off and on. Has a lot of \"upset stomach\" and nausea. She had last colonoscopy .  Last pap was 2017 and normal  Uses estring for vaginal dryness    Past Medical History:   Diagnosis Date   • Anxiety    • Asthma     AS CHILD   • Colonic polyp    • Depression    • Diverticulosis    • Dyslipidemia    • Electrocardiogram finding, abnormal, without diagnosis    • Elevated LFTs    • Esophageal reflux    • Fall    • Female pelvic pain    • Fibromyalgia    • Fracture    • GERD (gastroesophageal reflux disease)    • Gestational diabetes    • H/O mammogram    • Headache    • History of blood transfusion    • History of Holter monitoring     Findings were normal..    • Hypertension    • Insomnia    • Joint pain    • Migraine    • Osteoarthritis    • Ovarian cyst    • Pain in joint of left hip    • Recurrent UTI (urinary tract infection)    • Rheumatic fever    • Shoulder sprain    • Swelling of ankle joint    • Tinnitus    • Vaginitis    • Vitamin B12 deficiency    • Vitamin D deficiency disease         Current Outpatient Prescriptions:   •  ciprofloxacin (CIPRO) 500 MG tablet, Every 12 (Twelve) Hours., Disp: , Rfl:   •  estradiol (ESTRING) 2 MG vaginal ring, Estring 2 mg (7.5 mcg/24 hour) vaginal ring, Disp: , Rfl:   •  fluconazole (DIFLUCAN) " 150 MG tablet, Diflucan 150 mg tablet  Take 1 tablet every day by oral route., Disp: , Rfl:   •  Glucosamine-Chondroitin (OSTEO BI-FLEX REGULAR STRENGTH PO), Take  by mouth., Disp: , Rfl:   •  ibuprofen (ADVIL,MOTRIN) 200 MG tablet, Take 200 mg by mouth Every 6 (Six) Hours As Needed for Mild Pain ., Disp: , Rfl:   •  LORazepam (ATIVAN) 1 MG tablet, TAKE 1 TO 1 1/2 TAB BY MOUTH NIGHTLY AS NEEDED ANXIETY/INSOMNIA, Disp: 120 tablet, Rfl: 0  •  Misc Natural Products (OSTEO BI-FLEX/5-LOXIN ADVANCED PO), Osteo Bi-Flex  1 po QDay, Disp: , Rfl:   •  Multiple Vitamins-Minerals (MULTIVITAMIN ADULT EXTRA C PO), multivitamin  1 po QDay, Disp: , Rfl:   •  omeprazole (priLOSEC) 20 MG capsule, Take 20 mg by mouth 2 (Two) Times a Day., Disp: , Rfl:   •  phenazopyridine (PYRIDIUM) 200 MG tablet, Take 200 mg by mouth 3 (Three) Times a Day., Disp: , Rfl: 0  •  Probiotic Product (PROBIOTIC-10) capsule, Take  by mouth., Disp: , Rfl:   •  raNITIdine (ZANTAC) 75 MG tablet, Take 75 mg by mouth 2 (Two) Times a Day., Disp: , Rfl:   •  triamterene-hydrochlorothiazide (MAXZIDE-25) 37.5-25 MG per tablet, TAKE 1 TABLET BY MOUTH DAILY., Disp: 90 tablet, Rfl: 3  •  guaiFENesin (MUCINEX) 600 MG 12 hr tablet, Take 1,200 mg by mouth 2 (Two) Times a Day., Disp: , Rfl:   •  Magnesium 100 MG capsule, Take  by mouth., Disp: , Rfl:    Allergies   Allergen Reactions   • Sulfa Antibiotics Anaphylaxis   • Sulfamethoxazole-Trimethoprim Anaphylaxis   • Ciprofloxacin Rash   • Clarithromycin Rash   • Gabapentin Rash   • Nitrofurantoin Nausea Only   • Penicillins Rash   • Risperidone Rash   • Zyprexa [Olanzapine] Other (See Comments)     Extreme agitation      Past Surgical History:   Procedure Laterality Date   • ABDOMINAL SURGERY     • APPENDECTOMY     • CHOLECYSTECTOMY     • COLONOSCOPY      08/06/2016 DR. RAY   • DILATION AND CURETTAGE, DIAGNOSTIC / THERAPEUTIC     • ELECTROCONVULSIVE THERAPY Bilateral 10/6/2016    Procedure: BIFRONTAL ELECTROCONVULSIVE  THERAPY;  Surgeon: Omi Foss MD;  Location:  [facility];  Service:    • ELECTROCONVULSIVE THERAPY Bilateral 10/11/2016    Procedure: BIFRONTAL ELECTROCONVULSIVE THERAPY;  Surgeon: Omi Foss MD;  Location:  [facility];  Service:    • ELECTROCONVULSIVE THERAPY Bilateral 10/17/2016    Procedure: BIFRONTAL ELECTROCONVULSIVE THERAPY;  Surgeon: Omi Foss MD;  Location:  [facility];  Service:    • ELECTROCONVULSIVE THERAPY Bilateral 10/28/2016    Procedure: ELECTROCONVULSIVE THERAPY;  Surgeon: Omi Foss MD;  Location:  [facility];  Service:    • ELECTROCONVULSIVE THERAPY Bilateral 11/1/2016    Procedure: ELECTROCONVULSIVE THERAPY;  Surgeon: Omi Foss MD;  Location:  [facility];  Service:    • ELECTROCONVULSIVE THERAPY Bilateral 11/3/2016    Procedure: ELECTROCONVULSIVE THERAPY;  Surgeon: Omi Foss MD;  Location:  [facility];  Service:    • FRACTURE SURGERY     • HIP SURGERY      LEFT HIP REVISION DONOR BONE 06/03/2014   • JOINT REPLACEMENT     • TONSILLECTOMY     • TOTAL HIP ARTHROPLASTY Bilateral     LEFT-2010   RIGHT-2003   • TOTAL HIP ARTHROPLASTY  2004,2011,2015   • WRIST SURGERY        Social History     Social History   • Marital status:      Spouse name: N/A   • Number of children: N/A   • Years of education: N/A     Occupational History   • Not on file.     Social History Main Topics   • Smoking status: Never Smoker   • Smokeless tobacco: Never Used   • Alcohol use No   • Drug use: No   • Sexual activity: Yes     Partners: Male     Birth control/ protection: Post-menopausal     Other Topics Concern   • Not on file     Social History Narrative   • No narrative on file      Family History   Problem Relation Age of Onset   • Arthritis Mother    • Ovarian cancer Mother    • Stomach cancer Mother    • Migraines Mother    • Mental illness Mother    • Depression Mother    • Cancer Mother    • Arthritis Father    • Seizures Father          "EPILEPSY   • Hypertension Father    • Osteoporosis Father    • Mental illness Father    • Hyperlipidemia Father    • Stroke Father    • Depression Father    • Arthritis Sister    • Cancer Sister    • Migraines Sister    • Breast cancer Sister    • Cancer Other    • Diabetes Other    • Arthritis Other    • ADD / ADHD Neg Hx    • Alcohol abuse Neg Hx    • Anxiety disorder Neg Hx    • Bipolar disorder Neg Hx    • Dementia Neg Hx    • Drug abuse Neg Hx    • Paranoid behavior Neg Hx    • Schizophrenia Neg Hx    • Suicide Attempts Neg Hx        Review of Systems   Constitutional: Negative.    HENT: Positive for tinnitus.    Gastrointestinal: Positive for nausea.   Genitourinary: Positive for dysuria, frequency, pelvic pain and urgency.   Musculoskeletal: Positive for arthralgias.   Psychiatric/Behavioral: Positive for sleep disturbance.           Objective   /74   Ht 175.3 cm (69\")   Wt 66.7 kg (147 lb)   Breastfeeding? No   BMI 21.71 kg/m²     Physical Exam   Constitutional: She appears well-developed and well-nourished. She is cooperative. No distress.   Abdominal: Soft. Normal appearance. There is no hepatosplenomegaly. There is no tenderness. There is no rigidity, no guarding and no CVA tenderness.   Genitourinary: Vagina normal and uterus normal. There is no tenderness or lesion on the right labia. There is no tenderness or lesion on the left labia. Cervix exhibits no motion tenderness and no discharge. Right adnexum displays no mass and no tenderness. Left adnexum displays no mass.   Genitourinary Comments: estring in place  Normal adnexa with no tenderness or masses   Neurological: She is alert.   Skin: Skin is warm and dry.   Psychiatric: She has a normal mood and affect. Her behavior is normal.            Assessment and Plan  Leticia was seen today for pelvic pain.    Diagnoses and all orders for this visit:    Pelvic pain    Right flank pain    Family history of ovarian cancer  -     Ambulatory Referral " to Gynecology      Patient Instructions   Patient reassured normal pelvic exam and no masses felt  Recommend patient get established with [facility] ovarian cancer screening and patient would like to do this  Order placed for mammogram   Advised to finish her Cipro and if persistent UTI symptoms or flank pain to follow up with her PCP             This note was electronically signed.    Tonya Weaver PA-C   June 6, 2018

## 2018-06-06 NOTE — PATIENT INSTRUCTIONS
Patient reassured normal pelvic exam and no masses felt  Recommend patient get established with [facility] ovarian cancer screening and patient would like to do this  Order placed for mammogram   Advised to finish her Cipro and if persistent UTI symptoms or flank pain to follow up with her PCP

## 2018-06-21 ENCOUNTER — TRANSCRIBE ORDERS (OUTPATIENT)
Dept: ADMINISTRATIVE | Facility: HOSPITAL | Age: 63
End: 2018-06-21

## 2018-06-21 DIAGNOSIS — R13.10 DYSPHAGIA, UNSPECIFIED TYPE: Primary | ICD-10-CM

## 2018-07-03 ENCOUNTER — HOSPITAL ENCOUNTER (OUTPATIENT)
Dept: GENERAL RADIOLOGY | Facility: HOSPITAL | Age: 63
Discharge: HOME | End: 2018-07-03
Admitting: FAMILY MEDICINE

## 2018-07-03 DIAGNOSIS — R13.10 DYSPHAGIA, UNSPECIFIED TYPE: ICD-10-CM

## 2018-07-03 PROCEDURE — 74230 X-RAY XM SWLNG FUNCJ C+: CPT

## 2018-07-03 PROCEDURE — G8996 SWALLOW CURRENT STATUS: HCPCS

## 2018-07-03 PROCEDURE — G8998 SWALLOW D/C STATUS: HCPCS

## 2018-07-03 PROCEDURE — G8997 SWALLOW GOAL STATUS: HCPCS

## 2018-07-03 PROCEDURE — 92611 MOTION FLUOROSCOPY/SWALLOW: CPT

## 2018-07-03 RX ORDER — LORAZEPAM 1 MG/1
TABLET ORAL
Qty: 120 TABLET | Refills: 0 | Status: SHIPPED | OUTPATIENT
Start: 2018-07-03 | End: 2018-07-16 | Stop reason: SDUPTHER

## 2018-07-03 NOTE — MBS/VFSS/FEES
Speech Language Pathology   MBS FEES / Discharge Summary   Sandra       Patient Name: Leticia Arreguin  : 1955  MRN: [id number]    Today's Date: 7/3/2018      Visit Date: 2018     Visit Dx:     ICD-10-CM ICD-9-CM   1. Dysphagia, unspecified type R13.10 787.20       Patient Active Problem List   Diagnosis   • Arthritis   • Asthma   • Benign essential hypertension   • Brachial (cervical) neuritis   • Constipation   • Gastroesophageal reflux disease with esophagitis   • Fibromyalgia   • Hearing loss   • Hematochezia   • Common migraine with intractable migraine   • Multiple-type hyperlipidemia   • Tinnitus   • Urinary tract infection   • Vaginitis   • Cobalamin deficiency   • Vitamin D deficiency   • Major depression, recurrent (CMS/HCC)   • Dyslipidemia   • GERD (gastroesophageal reflux disease)   • Migraine   • Vitamin B12 deficiency   • Vitamin D deficiency disease   • Insomnia   • Osteoarthritis   • Anxiety        Past Medical History:   Diagnosis Date   • Anxiety    • Asthma     AS CHILD   • Colonic polyp    • Depression    • Diverticulosis    • Dyslipidemia    • Electrocardiogram finding, abnormal, without diagnosis    • Elevated LFTs    • Esophageal reflux    • Fall    • Female pelvic pain    • Fibromyalgia    • Fracture    • GERD (gastroesophageal reflux disease)    • Gestational diabetes    • H/O mammogram    • Headache    • History of blood transfusion    • History of Holter monitoring     Findings were normal..    • Hypertension    • Insomnia    • Joint pain    • Migraine    • Osteoarthritis    • Ovarian cyst    • Pain in joint of left hip    • Recurrent UTI (urinary tract infection)    • Rheumatic fever    • Shoulder sprain    • Swelling of ankle joint    • Tinnitus    • Vaginitis    • Vitamin B12 deficiency    • Vitamin D deficiency disease         Past Surgical History:   Procedure Laterality Date   • ABDOMINAL SURGERY     • APPENDECTOMY     • CHOLECYSTECTOMY     • COLONOSCOPY       "08/06/2016 DR. RYA   • DILATION AND CURETTAGE, DIAGNOSTIC / THERAPEUTIC     • ELECTROCONVULSIVE THERAPY Bilateral 10/6/2016    Procedure: BIFRONTAL ELECTROCONVULSIVE THERAPY;  Surgeon: Omi Foss MD;  Location:  [facility];  Service:    • ELECTROCONVULSIVE THERAPY Bilateral 10/11/2016    Procedure: BIFRONTAL ELECTROCONVULSIVE THERAPY;  Surgeon: Omi Foss MD;  Location:  [facility];  Service:    • ELECTROCONVULSIVE THERAPY Bilateral 10/17/2016    Procedure: BIFRONTAL ELECTROCONVULSIVE THERAPY;  Surgeon: Omi Foss MD;  Location:  [facility];  Service:    • ELECTROCONVULSIVE THERAPY Bilateral 10/28/2016    Procedure: ELECTROCONVULSIVE THERAPY;  Surgeon: Omi Foss MD;  [facility];  Service:    • ELECTROCONVULSIVE THERAPY Bilateral 11/1/2016    Procedure: ELECTROCONVULSIVE THERAPY;  Surgeon: Omi Foss MD;  [facility];  Service:    • ELECTROCONVULSIVE THERAPY Bilateral 11/3/2016    Procedure: ELECTROCONVULSIVE THERAPY;  Surgeon: Omi Foss MD;  Location: [facility];  Service:    • FRACTURE SURGERY     • HIP SURGERY      LEFT HIP REVISION DONOR BONE 06/03/2014   • JOINT REPLACEMENT     • TONSILLECTOMY     • TOTAL HIP ARTHROPLASTY Bilateral     LEFT-2010   RIGHT-2003   • TOTAL HIP ARTHROPLASTY  2004,2011,2015   • WRIST SURGERY                         SLP Adult Swallow Evaluation - 07/03/18 0940        Rehab Evaluation    Document Type evaluation  -ES    Subjective Information no complaints  -ES    Patient Observations alert;cooperative;agree to therapy  -ES    Patient Effort excellent  -ES       General Information    Patient Profile Reviewed yes  -ES    Pertinent History Of Current Problem pt reports \"food feels stuck\", reports hx reflux recently improved w/ medication adjustment, scope scheduled as well  -ES    Current Method of Nutrition regular textures;thin liquids  -ES    Prior Level of Function-Communication WFL  -ES "    Prior Level of Function-Swallowing esophageal concerns  -ES       Pain Assessment    Additional Documentation Pain Scale: Numbers Pre/Post-Treatment (Group)  -ES       Pain Scale: Numbers Pre/Post-Treatment    Pain Scale: Numbers, Pretreatment 0/10 - no pain  -ES    Pain Scale: Numbers, Post-Treatment 0/10 - no pain  -ES       MBS/VFSS    Utensils Used cup;straw  -ES    Consistencies Trialed regular textures;thin liquids;nectar/syrup-thick liquids  -ES       MBS/VFSS Interpretation    Oral Prep Phase WFL  -ES    Oral Transit Phase WFL  -ES    Oral Residue WFL  -ES    VFSS Summary MBS unremarkable.  No aspiration/penetration.  No pharyngeal residue.  Oral and pharyngeal phases WFL.  -ES       Initiation of Pharyngeal Swallow    Initiation of Pharyngeal Swallow WFL  -ES    Pharyngeal Phase functional pharyngeal phase of swallowing  -ES       Esophageal Phase    Esophageal Phase see radiology report for further details;other (see comments)  -ES    Esophageal Phase, Comment no UES dysfunction noted  -ES       SLP Communication to Radiology    Severity Level of Dysphagia WFL  -ES    Summary Statement Oral and pharyngeal phases WFL  -ES       Clinical Impression    SLP Swallowing Diagnosis functional oral phase;functional pharyngeal phase  -ES    Functional Impact no impact on function  -ES       Recommendations    SLP Diet Recommendation regular textures;thin liquids  -ES    Recommended Precautions and Strategies upright posture during/after eating;other (see comments)   reflux precautions  -ES    SLP Rec. for Method of Medication Administration as tolerated  -ES      User Key  (r) = Recorded By, (t) = Taken By, (c) = Cosigned By    Initials Name Provider Type    ES Kassy Sorto MS CCC-SLP Speech and Language Pathologist                               OP SLP Education     Row Name 07/03/18 4072       Education    Barriers to Learning No barriers identified  -ES    Education Provided Described results of  evaluation;Patient expressed understanding of evaluation  -ES    Assessed Learning needs  -ES    Learning Motivation Strong  -ES    Learning Method Explanation  -ES    Teaching Response Verbalized understanding  -ES      User Key  (r) = Recorded By, (t) = Taken By, (c) = Cosigned By    Initials Name Effective Dates    ES Kassy RANJANA Sorto MS CCC-SLP 04/03/18 -                       SLP Outcome Measures (last 72 hours)      SLP Outcome Measures     Row Name 07/03/18 0940             SLP Outcome Measures    Outcome Measure Used? Adult NOMS  -ES         OTHER    SLP Diagnoses Dysphagia  -ES         FCM Scores    FCM Chosen Swallowing  -ES      Swallowing FCM Score 7  -ES        User Key  (r) = Recorded By, (t) = Taken By, (c) = Cosigned By    Initials Name Effective Dates    ES Kassy Sorto MS CCC-SLP 04/03/18 -                          Time Calculation:        Therapy Charges for Today     Code Description Service Date Service Provider Modifiers Qty    90207012230 HC ST SWALLOWING CURRENT STATUS 7/3/2018 Kassy Sorto MS CCC-SLP GN, CH 1    75124734580 HC ST SWALLOWING PROJECTED 7/3/2018 Kassy Sorto MS CCC-SLP GN, CH 1    24050069411 HC ST SWALLOWING DISCHARGE 7/3/2018 Kassy Sorto MS CCC-SLP GN, CH 1    57525234230 HC ST MOTION FLUORO EVAL SWALLOW 6 7/3/2018 Kassy Sorto MS CCC-SLP GN 1                  Kassy Sorto MS CCC-SLP  7/3/2018

## 2018-07-05 RX ORDER — ESTRADIOL 2 MG/1
RING VAGINAL
Qty: 1 EACH | Refills: 1 | Status: SHIPPED | OUTPATIENT
Start: 2018-07-05 | End: 2018-07-31 | Stop reason: SDUPTHER

## 2018-07-16 ENCOUNTER — OFFICE VISIT (OUTPATIENT)
Dept: PSYCHIATRY | Facility: CLINIC | Age: 63
End: 2018-07-16

## 2018-07-16 VITALS — WEIGHT: 150 LBS | BODY MASS INDEX: 22.22 KG/M2 | HEIGHT: 69 IN

## 2018-07-16 DIAGNOSIS — F33.1 MAJOR DEPRESSIVE DISORDER, RECURRENT, MODERATE (HCC): ICD-10-CM

## 2018-07-16 DIAGNOSIS — F51.05 INSOMNIA DUE TO MENTAL CONDITION: ICD-10-CM

## 2018-07-16 DIAGNOSIS — F41.1 GAD (GENERALIZED ANXIETY DISORDER): Primary | ICD-10-CM

## 2018-07-16 PROCEDURE — 99214 OFFICE O/P EST MOD 30 MIN: CPT | Performed by: NURSE PRACTITIONER

## 2018-07-16 RX ORDER — LORAZEPAM 1 MG/1
TABLET ORAL
Qty: 45 TABLET | Refills: 0 | Status: SHIPPED | OUTPATIENT
Start: 2018-07-16 | End: 2018-09-26 | Stop reason: SDUPTHER

## 2018-07-16 NOTE — PROGRESS NOTES
"    Subjective   Leticia Arreguin is a 62 y.o. female who is here today for medication management follow up.    Chief Complaint: INDIRA   MDD recurrent, currently mild to mod  Insomnia     History of Present Illness Patient has been seeing Dr. Sinclair however he is leaving and since I have seen pt in past I will begin working with pt again per her consent.  She reports overall her anxiety level has been stable and rates it approx a 3.  She takes  1-1/2 mg Ativan nightly for sleep. According to records she does not run out early.  Her mood has been stable and denies persistent depression.  She admits to having a depressed day here and there but not continuous. The patient reports she keeps herself very busy in order to avoid some negative feelings. 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 She denies medication side effects.  Home life going welll. Appetite-fair   Energy level- \"tired, I did so much last week\".  Sleep- fair, disrupted at times \"but mostly good\". Denies SI/HI or AVH.   (Scales based on 0 - 10 with 10 being the worst)        The following portions of the patient's history were reviewed and updated as appropriate: allergies, current medications, past family history, past medical history, past social history, past surgical history and problem list.    Review of Systems   Neuro: denies new concerns has migraines she sees neurology  Cardio: neg  Pulmonary neg  GI: neg, had eval for dysphagia but improved    neg  Muscle/skel: neg     Objective   Physical Exam  Height 175.3 cm (69\"), weight 68 kg (150 lb), not currently breastfeeding.    Allergies   Allergen Reactions   • Sulfa Antibiotics Anaphylaxis   • Sulfamethoxazole-Trimethoprim Anaphylaxis   • Ciprofloxacin Rash   • Clarithromycin Rash   • Gabapentin Rash   • Nitrofurantoin Nausea Only   • Penicillins Rash   • Risperidone Rash   • Zyprexa [Olanzapine] Other (See Comments)     Extreme " "agitation       Current Medications:   Current Outpatient Prescriptions   Medication Sig Dispense Refill   • ciprofloxacin (CIPRO) 500 MG tablet Every 12 (Twelve) Hours.     • estradiol (ESTRING) 2 MG vaginal ring Estring 2 mg (7.5 mcg/24 hour) vaginal ring     • ESTRING 2 MG vaginal ring INSERT IN THE VAGINA AS DIRECTED 1 each 1   • fluconazole (DIFLUCAN) 150 MG tablet Diflucan 150 mg tablet   Take 1 tablet every day by oral route.     • Glucosamine-Chondroitin (OSTEO BI-FLEX REGULAR STRENGTH PO) Take  by mouth.     • guaiFENesin (MUCINEX) 600 MG 12 hr tablet Take 1,200 mg by mouth 2 (Two) Times a Day.     • ibuprofen (ADVIL,MOTRIN) 200 MG tablet Take 200 mg by mouth Every 6 (Six) Hours As Needed for Mild Pain .     • LORazepam (ATIVAN) 1 MG tablet 1-1-1/2 tabs by mouth nightly as needed for anxiety/insomnia 45 tablet 0   • Magnesium 100 MG capsule Take  by mouth.     • Misc Natural Products (OSTEO BI-FLEX/5-LOXIN ADVANCED PO) Osteo Bi-Flex   1 po QDay     • Multiple Vitamins-Minerals (MULTIVITAMIN ADULT EXTRA C PO) multivitamin   1 po QDay     • omeprazole (priLOSEC) 20 MG capsule Take 20 mg by mouth 2 (Two) Times a Day.     • phenazopyridine (PYRIDIUM) 200 MG tablet Take 200 mg by mouth 3 (Three) Times a Day.  0   • Probiotic Product (PROBIOTIC-10) capsule Take  by mouth.     • raNITIdine (ZANTAC) 75 MG tablet Take 75 mg by mouth 2 (Two) Times a Day.     • triamterene-hydrochlorothiazide (MAXZIDE-25) 37.5-25 MG per tablet TAKE 1 TABLET BY MOUTH DAILY. 90 tablet 3     No current facility-administered medications for this visit.        Appearance: appropriate  Hygiene:   good  Cooperation:  Cooperative  Eye Contact:  Good  Psychomotor Behavior:  Appropriate  Mood:  \"tired\"   Affect:  Appropriate  Hopelessness: Denies  Speech:  Normal  Thought Process: mostly goal directed   Thought Content:  Normal  Concentration: Normal   Suicidal:  None  Homicidal:  None  Hallucinations:  None  Delusion:  None  Memory:  " Intact  Orientation:  Person, Place, Time and Situation  Reliability:  fair  Insight:  Fair  Judgement:  Fair  Impulse Control:  Fair  Estimated Intelligence: average range     Assessment/Plan   Diagnoses and all orders for this visit:    INDIRA (generalized anxiety disorder)    Major depressive disorder, recurrent, moderate (CMS/HCC)    Insomnia due to mental condition    Other orders  -     LORazepam (ATIVAN) 1 MG tablet; 1-1-1/2 tabs by mouth nightly as needed for anxiety/insomnia    25 minutes face to face,  transferring pt from Dr. Sinclair's care to this provider    IMPRESSION: stable mood   PLAN:   Refill Ativan 1mg take 1-1.5 tablets at mg for anxiety/insomnia   Reviewed MARIBEL no red flags, scanned into chart   Reviewed lab work from January 2018 in [facility] and notes from Dr. Sinclair  Discussed current treatment will cont   We discussed risks, benefits, and side effects of the above medications and the patient was agreeable with the plan. Patient was educated on the importance of compliance with treatment and follow-up appointments.     Sleep hygiene reviewed, encouraged more whole foods, less processed foods, fruits   Coping skills reviewed and encouraged positive framing of thoughts    Assisted patient in processing above session content; acknowledged and normalized patient’s thoughts, feelings, and concerns.  Applied  positive coping skills and behavior management in session.  Allowed patient to freely discuss issues without interruption or judgment. Provided safe, confidential environment to facilitate the development of positive therapeutic relationship and encourage open, honest communication. Assisted patient in identifying risk factors which would indicate the need for higher level of care including thoughts to harm self or others and/or self-harming behavior and encouraged patient to contact this office, call 911, or present to the nearest emergency room should any of these events occur. Discussed crisis  intervention services and means to access.  Patient adamantly and convincingly denies current suicidal or homicidal ideation or perceptual disturbance.    Instructed to call for questions or concerns and return early if necessary. Crisis plan reviewed including going to the Emergency department.    Return in about 3 months (around 10/16/2018).

## 2018-07-20 ENCOUNTER — OFFICE VISIT (OUTPATIENT)
Dept: OBSTETRICS AND GYNECOLOGY | Facility: CLINIC | Age: 63
End: 2018-07-20

## 2018-07-20 VITALS
SYSTOLIC BLOOD PRESSURE: 110 MMHG | WEIGHT: 150.6 LBS | HEIGHT: 69 IN | BODY MASS INDEX: 22.31 KG/M2 | DIASTOLIC BLOOD PRESSURE: 72 MMHG

## 2018-07-20 DIAGNOSIS — Z12.39 SCREENING FOR BREAST CANCER: ICD-10-CM

## 2018-07-20 DIAGNOSIS — Z01.419 ENCOUNTER FOR GYNECOLOGICAL EXAMINATION WITHOUT ABNORMAL FINDING: Primary | ICD-10-CM

## 2018-07-20 DIAGNOSIS — Z12.4 SCREENING FOR MALIGNANT NEOPLASM OF CERVIX: ICD-10-CM

## 2018-07-20 PROCEDURE — 99396 PREV VISIT EST AGE 40-64: CPT | Performed by: PHYSICIAN ASSISTANT

## 2018-07-20 RX ORDER — AMOXICILLIN 500 MG/1
CAPSULE ORAL
Refills: 3 | COMMUNITY
Start: 2018-07-17 | End: 2018-07-31

## 2018-07-20 RX ORDER — FLUCONAZOLE 150 MG/1
TABLET ORAL
COMMUNITY
End: 2018-07-31

## 2018-07-20 NOTE — PROGRESS NOTES
Subjective   Chief Complaint   Patient presents with   • Gynecologic Exam     Pap and MMG are both due; no complaints       Leticia Arreguin is a 62 y.o. year old  presenting to be seen for her annual gynecological exam.   She has no gyn complaints today  She is due for mammogram and will do at [facility]  She is using estring and does well with this  Had DEXA 2018 noted in chart and normal    Past Medical History:   Diagnosis Date   • Anxiety    • Asthma     AS CHILD   • Colonic polyp    • Depression    • Diverticulosis    • Dyslipidemia    • Electrocardiogram finding, abnormal, without diagnosis    • Elevated LFTs    • Esophageal reflux    • Fall    • Female pelvic pain    • Fibromyalgia    • Fracture    • GERD (gastroesophageal reflux disease)    • Gestational diabetes    • H/O mammogram    • Headache    • History of blood transfusion    • History of Holter monitoring     Findings were normal..    • Hypertension    • Insomnia    • Joint pain    • Migraine    • Osteoarthritis    • Ovarian cyst    • Pain in joint of left hip    • Recurrent UTI (urinary tract infection)    • Rheumatic fever    • Shoulder sprain    • Swelling of ankle joint    • Tinnitus    • Vaginitis    • Vitamin B12 deficiency    • Vitamin D deficiency disease         Current Outpatient Prescriptions:   •  estradiol (ESTRING) 2 MG vaginal ring, Estring 2 mg (7.5 mcg/24 hour) vaginal ring, Disp: , Rfl:   •  ESTRING 2 MG vaginal ring, INSERT IN THE VAGINA AS DIRECTED, Disp: 1 each, Rfl: 1  •  Glucosamine-Chondroitin (OSTEO BI-FLEX REGULAR STRENGTH PO), Take  by mouth., Disp: , Rfl:   •  guaiFENesin (MUCINEX) 600 MG 12 hr tablet, Take 1,200 mg by mouth 2 (Two) Times a Day., Disp: , Rfl:   •  LORazepam (ATIVAN) 1 MG tablet, 1-1-1/2 tabs by mouth nightly as needed for anxiety/insomnia, Disp: 45 tablet, Rfl: 0  •  Magnesium 100 MG capsule, Take  by mouth., Disp: , Rfl:   •  Misc Natural Products (OSTEO BI-FLEX/5-LOXIN ADVANCED PO), Osteo  Bi-Flex  1 po QDay, Disp: , Rfl:   •  Multiple Vitamins-Minerals (MULTIVITAMIN ADULT EXTRA C PO), multivitamin  1 po QDay, Disp: , Rfl:   •  omeprazole (priLOSEC) 20 MG capsule, Take 20 mg by mouth 2 (Two) Times a Day., Disp: , Rfl:   •  Probiotic Product (PROBIOTIC-10) capsule, Take  by mouth., Disp: , Rfl:   •  triamterene-hydrochlorothiazide (MAXZIDE-25) 37.5-25 MG per tablet, TAKE 1 TABLET BY MOUTH DAILY., Disp: 90 tablet, Rfl: 3  •  amoxicillin (AMOXIL) 500 MG capsule, TAKE 4 CAPSULES BY MOUTH 1 HOUR PRIOR TO DENTAL APPT, Disp: , Rfl: 3  •  ciprofloxacin (CIPRO) 500 MG tablet, Every 12 (Twelve) Hours., Disp: , Rfl:   •  fluconazole (DIFLUCAN) 150 MG tablet, Diflucan 150 mg tablet  Take 1 tablet every day by oral route., Disp: , Rfl:   •  fluconazole (DIFLUCAN) 150 MG tablet, fluconazole 150 mg tablet, Disp: , Rfl:   •  ibuprofen (ADVIL,MOTRIN) 200 MG tablet, Take 200 mg by mouth Every 6 (Six) Hours As Needed for Mild Pain ., Disp: , Rfl:   •  phenazopyridine (PYRIDIUM) 200 MG tablet, Take 200 mg by mouth 3 (Three) Times a Day., Disp: , Rfl: 0  •  raNITIdine (ZANTAC) 75 MG tablet, Take 75 mg by mouth 2 (Two) Times a Day., Disp: , Rfl:    Allergies   Allergen Reactions   • Sulfa Antibiotics Anaphylaxis   • Sulfamethoxazole-Trimethoprim Anaphylaxis   • Ciprofloxacin Rash   • Clarithromycin Rash   • Gabapentin Rash   • Nitrofurantoin Nausea Only   • Penicillins Rash   • Risperidone Rash   • Zyprexa [Olanzapine] Other (See Comments)     Extreme agitation      Past Surgical History:   Procedure Laterality Date   • ABDOMINAL SURGERY     • APPENDECTOMY     • CHOLECYSTECTOMY     • COLONOSCOPY      08/06/2016 DR. RAY   • DILATION AND CURETTAGE, DIAGNOSTIC / THERAPEUTIC     • ELECTROCONVULSIVE THERAPY Bilateral 10/6/2016    Procedure: BIFRONTAL ELECTROCONVULSIVE THERAPY;  Surgeon: Omi Foss MD;  Location: [facility];  Service:    • ELECTROCONVULSIVE THERAPY Bilateral 10/11/2016    Procedure: BIFRONTAL  ELECTROCONVULSIVE THERAPY;  Surgeon: Omi Foss MD;  Location:  [facility];  Service:    • ELECTROCONVULSIVE THERAPY Bilateral 10/17/2016    Procedure: BIFRONTAL ELECTROCONVULSIVE THERAPY;  Surgeon: Omi Foss MD;  Location:  [facility];  Service:    • ELECTROCONVULSIVE THERAPY Bilateral 10/28/2016    Procedure: ELECTROCONVULSIVE THERAPY;  Surgeon: Omi Foss MD;  Location:  [facility];  Service:    • ELECTROCONVULSIVE THERAPY Bilateral 11/1/2016    Procedure: ELECTROCONVULSIVE THERAPY;  Surgeon: Omi Foss MD;  Location:  [facility];  Service:    • ELECTROCONVULSIVE THERAPY Bilateral 11/3/2016    Procedure: ELECTROCONVULSIVE THERAPY;  Surgeon: Omi Foss MD;  Location: [facility] OR;  Service:    • FRACTURE SURGERY     • HIP SURGERY      LEFT HIP REVISION DONOR BONE 06/03/2014   • JOINT REPLACEMENT     • TONSILLECTOMY     • TOTAL HIP ARTHROPLASTY Bilateral     LEFT-2010   RIGHT-2003   • TOTAL HIP ARTHROPLASTY  2004,2011,2015   • WRIST SURGERY        Social History     Social History   • Marital status:      Spouse name: N/A   • Number of children: N/A   • Years of education: N/A     Occupational History   • Not on file.     Social History Main Topics   • Smoking status: Never Smoker   • Smokeless tobacco: Never Used   • Alcohol use No   • Drug use: No   • Sexual activity: Yes     Partners: Male     Birth control/ protection: Post-menopausal     Other Topics Concern   • Not on file     Social History Narrative   • No narrative on file      Family History   Problem Relation Age of Onset   • Arthritis Mother    • Ovarian cancer Mother    • Stomach cancer Mother    • Migraines Mother    • Mental illness Mother    • Depression Mother    • Cancer Mother    • Arthritis Father    • Seizures Father         EPILEPSY   • Hypertension Father    • Osteoporosis Father    • Mental illness Father    • Hyperlipidemia Father    • Stroke Father    • Depression Father   "  • Arthritis Sister    • Cancer Sister    • Migraines Sister    • Breast cancer Sister    • Cancer Other    • Diabetes Other    • Arthritis Other    • ADD / ADHD Neg Hx    • Alcohol abuse Neg Hx    • Anxiety disorder Neg Hx    • Bipolar disorder Neg Hx    • Dementia Neg Hx    • Drug abuse Neg Hx    • Paranoid behavior Neg Hx    • Schizophrenia Neg Hx    • Suicide Attempts Neg Hx        Review of Systems   Constitutional: Negative.    Gastrointestinal: Negative.    Genitourinary: Negative.            Objective   /72   Ht 175.3 cm (69\")   Wt 68.3 kg (150 lb 9.6 oz)   Breastfeeding? No   BMI 22.24 kg/m²     Physical Exam   Constitutional: She appears well-developed and well-nourished. She is cooperative.   Pulmonary/Chest: Right breast exhibits no inverted nipple, no mass, no nipple discharge, no skin change and no tenderness. Left breast exhibits no inverted nipple, no mass, no nipple discharge, no skin change and no tenderness.   Abdominal: Soft. Normal appearance. There is no tenderness.   Genitourinary: Vagina normal and uterus normal. There is no tenderness or lesion on the right labia. There is no tenderness or lesion on the left labia. Cervix exhibits no motion tenderness and no discharge. Right adnexum displays no mass and no tenderness. Left adnexum displays no mass and no tenderness.   Genitourinary Comments: estring in place  Pap done     Neurological: She is alert.   Skin: Skin is warm and dry.   Psychiatric: She has a normal mood and affect. Her behavior is normal.            Assessment and Plan  Leticia was seen today for gynecologic exam.    Diagnoses and all orders for this visit:    Encounter for gynecological examination without abnormal finding    Screening for malignant neoplasm of cervix  -     Liquid-based Pap Smear, Screening; Future    Screening for breast cancer  -     Mammo Screening Digital Tomosynthesis Bilateral With CAD; Future      Patient Instructions   Self breast exam " monthly  Annual mammogram  Calcium 1200 mg daily and vit D 2000 mg daily  Regular excercise             This note was electronically signed.    Tonya Weaver PA-C   July 20, 2018

## 2018-07-25 DIAGNOSIS — Z12.4 SCREENING FOR MALIGNANT NEOPLASM OF CERVIX: ICD-10-CM

## 2018-07-26 RX ORDER — FLUCONAZOLE 150 MG/1
TABLET ORAL
Qty: 2 TABLET | Refills: 0 | Status: SHIPPED | OUTPATIENT
Start: 2018-07-26 | End: 2018-07-31

## 2018-07-31 ENCOUNTER — OFFICE VISIT (OUTPATIENT)
Dept: NEUROLOGY | Facility: CLINIC | Age: 63
End: 2018-07-31

## 2018-07-31 ENCOUNTER — HOSPITAL ENCOUNTER (OUTPATIENT)
Dept: MAMMOGRAPHY | Facility: HOSPITAL | Age: 63
Discharge: HOME | End: 2018-07-31
Admitting: PHYSICIAN ASSISTANT

## 2018-07-31 VITALS
WEIGHT: 147 LBS | HEIGHT: 69 IN | BODY MASS INDEX: 21.77 KG/M2 | SYSTOLIC BLOOD PRESSURE: 122 MMHG | OXYGEN SATURATION: 98 % | HEART RATE: 83 BPM | DIASTOLIC BLOOD PRESSURE: 80 MMHG

## 2018-07-31 DIAGNOSIS — Z12.39 SCREENING FOR BREAST CANCER: ICD-10-CM

## 2018-07-31 DIAGNOSIS — G43.711 CHRONIC MIGRAINE WITHOUT AURA, INTRACTABLE, WITH STATUS MIGRAINOSUS: Primary | ICD-10-CM

## 2018-07-31 PROCEDURE — 99214 OFFICE O/P EST MOD 30 MIN: CPT | Performed by: NURSE PRACTITIONER

## 2018-07-31 PROCEDURE — 77067 SCR MAMMO BI INCL CAD: CPT

## 2018-07-31 PROCEDURE — 77063 BREAST TOMOSYNTHESIS BI: CPT

## 2018-07-31 RX ORDER — OMEPRAZOLE 40 MG/1
CAPSULE, DELAYED RELEASE ORAL
Refills: 4 | COMMUNITY
Start: 2018-07-20 | End: 2020-07-23

## 2018-07-31 RX ORDER — DIVALPROEX SODIUM 125 MG/1
125 TABLET, DELAYED RELEASE ORAL 2 TIMES DAILY
Qty: 60 TABLET | Refills: 2 | Status: SHIPPED | OUTPATIENT
Start: 2018-07-31 | End: 2018-10-15

## 2018-08-16 ENCOUNTER — HOSPITAL ENCOUNTER (OUTPATIENT)
Dept: MRI IMAGING | Facility: HOSPITAL | Age: 63
Discharge: HOME | End: 2018-08-16
Admitting: NURSE PRACTITIONER

## 2018-08-16 DIAGNOSIS — G43.711 CHRONIC MIGRAINE WITHOUT AURA, INTRACTABLE, WITH STATUS MIGRAINOSUS: ICD-10-CM

## 2018-08-16 PROCEDURE — 70551 MRI BRAIN STEM W/O DYE: CPT

## 2018-09-04 ENCOUNTER — PROCEDURE VISIT (OUTPATIENT)
Dept: NEUROLOGY | Facility: CLINIC | Age: 63
End: 2018-09-04

## 2018-09-04 VITALS
HEART RATE: 82 BPM | BODY MASS INDEX: 21.77 KG/M2 | DIASTOLIC BLOOD PRESSURE: 66 MMHG | HEIGHT: 69 IN | OXYGEN SATURATION: 97 % | SYSTOLIC BLOOD PRESSURE: 144 MMHG | WEIGHT: 147 LBS

## 2018-09-04 DIAGNOSIS — G43.711 INTRACTABLE CHRONIC MIGRAINE WITHOUT AURA AND WITH STATUS MIGRAINOSUS: Primary | ICD-10-CM

## 2018-09-04 PROCEDURE — 64615 CHEMODENERV MUSC MIGRAINE: CPT | Performed by: NURSE PRACTITIONER

## 2018-09-26 RX ORDER — LORAZEPAM 1 MG/1
TABLET ORAL
Qty: 45 TABLET | Refills: 0 | Status: SHIPPED | OUTPATIENT
Start: 2018-09-26 | End: 2018-10-15 | Stop reason: SDUPTHER

## 2018-10-15 ENCOUNTER — OFFICE VISIT (OUTPATIENT)
Dept: PSYCHIATRY | Facility: CLINIC | Age: 63
End: 2018-10-15

## 2018-10-15 VITALS — HEIGHT: 69 IN | WEIGHT: 158 LBS | BODY MASS INDEX: 23.4 KG/M2

## 2018-10-15 DIAGNOSIS — F41.1 GAD (GENERALIZED ANXIETY DISORDER): Primary | ICD-10-CM

## 2018-10-15 PROCEDURE — 99213 OFFICE O/P EST LOW 20 MIN: CPT | Performed by: NURSE PRACTITIONER

## 2018-10-15 RX ORDER — LORAZEPAM 1 MG/1
TABLET ORAL
Qty: 45 TABLET | Refills: 0 | Status: SHIPPED | OUTPATIENT
Start: 2018-10-24 | End: 2018-11-27 | Stop reason: SDUPTHER

## 2018-10-15 NOTE — PROGRESS NOTES
"    Subjective   Leticia Mamie Arreguin is a 62 y.o. female who is here today for medication management follow up.    Chief Complaint    INDIRA (generalized anxiety disorder)  MDD recurrent moderate         History of Present Illness Patient presents rating anxiety a 2-3 most days, she walks daily and had swam until they closed the pool, too cold. She reports self image issues and self worth, she \"doesn't have time for therapy\". She reports she and  just are more like roommates \"too tired to work on it\". Discussed sleep, has been on most sleep aides OTC and prescriptions and reports she hasn't been on any that helped or she reacted opposite. She doesn't feel comfortable trying a different med for sleep but admits to frequent awakenings and has seen neurology for migraines and got botox .  Denies adverse effects from medications.   (Scales based on 0 - 10 with 10 being the worst)        The following portions of the patient's history were reviewed and updated as appropriate: allergies, current medications, past family history, past medical history, past social history, past surgical history and problem list.    Review of Systemsdenies fever, cough, s/s’s of infection, denies GI/ problems, denies new medical issues     Objective   Physical Exam  Height 175.3 cm (69\"), weight 71.7 kg (158 lb), not currently breastfeeding.    Allergies   Allergen Reactions   • Sulfa Antibiotics Anaphylaxis   • Sulfamethoxazole-Trimethoprim Anaphylaxis   • Ciprofloxacin Rash   • Clarithromycin Rash   • Gabapentin Rash   • Nitrofurantoin Nausea Only   • Penicillins Rash   • Risperidone Rash   • Zyprexa [Olanzapine] Other (See Comments)     Extreme agitation       Current Medications:   Current Outpatient Prescriptions   Medication Sig Dispense Refill   • ciprofloxacin (CIPRO) 500 MG tablet Every 12 (Twelve) Hours.     • estradiol (ESTRING) 2 MG vaginal ring Estring 2 mg (7.5 mcg/24 hour) vaginal ring     • fluconazole (DIFLUCAN) 150 MG " tablet Diflucan 150 mg tablet   Take 1 tablet every day by oral route.     • Glucosamine-Chondroitin (OSTEO BI-FLEX REGULAR STRENGTH PO) Take  by mouth.     • guaiFENesin (MUCINEX) 600 MG 12 hr tablet Take 1,200 mg by mouth 2 (Two) Times a Day.     • ibuprofen (ADVIL,MOTRIN) 200 MG tablet Take 200 mg by mouth Every 6 (Six) Hours As Needed for Mild Pain .     • [START ON 10/24/2018] LORazepam (ATIVAN) 1 MG tablet 1-1-1/2 tabs by mouth nightly as needed for anxiety/insomnia 45 tablet 0   • Magnesium 100 MG capsule Take  by mouth.     • Misc Natural Products (OSTEO BI-FLEX/5-LOXIN ADVANCED PO) Osteo Bi-Flex   1 po QDay     • Multiple Vitamins-Minerals (MULTIVITAMIN ADULT EXTRA C PO) multivitamin   1 po QDay     • omeprazole (priLOSEC) 40 MG capsule TAKE ONE CAPSULE BY MOUTH EVERY DAY BEFORE A MEAL  4   • OnabotulinumtoxinA 200 units reconstituted solution INJECT 155 UNITS IM TO HEAD AND NECK AREA EVERY 12 WEEKS. DISCARD UNUSED PORTIONS 1 each 2   • phenazopyridine (PYRIDIUM) 200 MG tablet Take 200 mg by mouth 3 (Three) Times a Day.  0   • Probiotic Product (PROBIOTIC-10) capsule Take  by mouth.     • raNITIdine (ZANTAC) 75 MG tablet Take 75 mg by mouth 2 (Two) Times a Day.     • triamterene-hydrochlorothiazide (MAXZIDE-25) 37.5-25 MG per tablet TAKE 1 TABLET BY MOUTH DAILY. 90 tablet 3     No current facility-administered medications for this visit.         Appearance: appropriate  Hygiene:   good  Cooperation:  Cooperative  Eye Contact:  Good  Psychomotor Behavior:  No psychomotor agitation/retardation, No EPS, No motor tics  Mood:  within normal limits  Affect:  Appropriate  Hopelessness: Denies  Speech:  Normal  Thought Process:  Linear  Thought Content:  Normal  Concentration: Normal   Suicidal:  None  Homicidal:  None  Hallucinations:  None  Delusion:  None  Memory:  Intact  Orientation:  Person, Place, Time and Situation  Reliability:  fair  Insight:  Fair  Judgement:  Fair  Impulse Control:  Fair  Estimated  Intelligence: average range    MARIBEL REVIEWED NO RED FLAGS  UDS:    Assessment/Plan   Diagnoses and all orders for this visit:    INDIRA (generalized anxiety disorder)    Other orders  -     LORazepam (ATIVAN) 1 MG tablet; 1-1-1/2 tabs by mouth nightly as needed for anxiety/insomnia        IMPRESSION: productive caring for children, anxiety low but so is energy and self esteem  PLAN:   Discussed sleep prescription options, she doesn't want a change in medications     We discussed risks, benefits, and side effects of the above medications and the patient was agreeable with the plan. Patient was educated on the importance of compliance with treatment and follow-up appointments.     Sleep hygiene reviewed, encouraged more whole foods, less processed foods, fruits vegetables , more activity   Coping skills reviewed and encouraged positive framing of thoughts    Assisted patient in processing above session content; acknowledged and normalized patient’s thoughts, feelings, and concerns.  Applied  positive coping skills and behavior management in session.  Allowed patient to freely discuss issues without interruption or judgment. Provided safe, confidential environment to facilitate the development of positive therapeutic relationship and encourage open, honest communication. Assisted patient in identifying risk factors which would indicate the need for higher level of care including thoughts to harm self or others and/or self-harming behavior and encouraged patient to contact this office, call 911, or present to the nearest emergency room should any of these events occur. Discussed crisis intervention services and means to access.  Patient adamantly and convincingly denies current suicidal or homicidal ideation or perceptual disturbance.    Treatment Plan: stabilize mood, patient will stay out of the hospital and be at optimal level of functioning, take all medication as prescribed. Patient verbalized  understanding and  agreement to plan.    Instructed to call for questions or concerns and return early if necessary. Crisis plan reviewed including going to the Emergency department.    Return in about 3 months (around 1/15/2019).

## 2018-11-01 ENCOUNTER — OFFICE VISIT (OUTPATIENT)
Dept: NEUROLOGY | Facility: CLINIC | Age: 63
End: 2018-11-01

## 2018-11-01 VITALS
HEIGHT: 69 IN | DIASTOLIC BLOOD PRESSURE: 74 MMHG | WEIGHT: 147 LBS | OXYGEN SATURATION: 98 % | BODY MASS INDEX: 21.77 KG/M2 | SYSTOLIC BLOOD PRESSURE: 120 MMHG | HEART RATE: 73 BPM

## 2018-11-01 DIAGNOSIS — G43.119 INTRACTABLE MIGRAINE WITH AURA WITHOUT STATUS MIGRAINOSUS: Primary | ICD-10-CM

## 2018-11-01 PROCEDURE — 99212 OFFICE O/P EST SF 10 MIN: CPT | Performed by: NURSE PRACTITIONER

## 2018-11-01 NOTE — PROGRESS NOTES
Subjective   Leticia Arreguin is a 62 y.o. female     Chief Complaint   Patient presents with   • Follow-up     Pt is here for a FU for her botox injections. Pt states that she has still had migraines but relates it to the weather and seasonal allergies.        History of Present Illness     Pt is a very pleasant 63 yo female in clinic to follow up Botox for migraines states she is gone from 15 migraines monthly to 8 monthly patient currently pleased with Botox she states she has had a head cold with the weather changes to she believes that she would've had even less migraines.    The following portions of the patient's history were reviewed and updated as appropriate: allergies, current medications, past family history, past medical history, past social history, past surgical history and problem list.    Review of Systems   Constitutional: Negative for chills and fever.   HENT: Negative for congestion, ear pain, hearing loss, rhinorrhea and sore throat.    Eyes: Negative for pain, discharge and redness.   Respiratory: Negative for cough, shortness of breath, wheezing and stridor.    Cardiovascular: Negative for chest pain, palpitations and leg swelling.   Gastrointestinal: Negative for abdominal pain, constipation, nausea and vomiting.   Endocrine: Negative for cold intolerance, heat intolerance and polyphagia.   Genitourinary: Negative for dysuria, flank pain, frequency and urgency.   Musculoskeletal: Positive for myalgias. Negative for joint swelling, neck pain and neck stiffness.   Skin: Negative for pallor, rash and wound.   Allergic/Immunologic: Negative for environmental allergies.   Neurological: Positive for headaches. Negative for dizziness, tremors, seizures, syncope, facial asymmetry, speech difficulty, weakness, light-headedness and numbness.   Hematological: Negative for adenopathy.   Psychiatric/Behavioral: Negative for confusion and hallucinations. The patient is not nervous/anxious.   "      Objective       Vitals:    11/01/18 1123   BP: 120/74   Pulse: 73   SpO2: 98%   Weight: 66.7 kg (147 lb)   Height: 175.3 cm (69\")     GENERAL: Patient is pleasant, cooperative, appears to be stated age.  Body habitus is endomorphic.  HEAD:  Head is normocephalic and atraumatic.    NECK: Neck are non-tender without thyromegaly or adenopathy.  Carotic upstrokes are 1+/4.  No cranial or cervical bruits.  The neck is supple with a full range of motion.   CARDIOVASCULAR:  Regular rate and rhythm with normal S1 and S2 without rub or gallop.  RESPIRATORY:  Clear to auscultation without wheezes or crackle   PSYCH:  Higher cortical function/mental status:  The patient is alert.  She  is oriented x3 to time, place and person.  Recent and the remote memory appear normal.  The patient has a good fund of knowledge.  There is no visual or auditory hallucination or suicidal or homicidal ideation.  SPEECH:There is no gross evidence of aphasia, dysarthria or agnosia.      COORDINATION AND GAIT:  The patient walks with a narrow-based gait.     Results for orders placed or performed in visit on 01/24/18   Urine Culture - Urine, Urine, Clean Catch   Result Value Ref Range    Urine Culture Final report     Result 1 No growth    POCT urinalysis dipstick, automated   Result Value Ref Range    Color Yellow Yellow, Straw, Dark Yellow, Orin    Clarity, UA Cloudy (A) Clear    Glucose, UA Negative Negative, 1000 mg/dL (3+) mg/dL    Bilirubin Negative Negative    Ketones, UA Negative Negative    Specific Gravity  1.000 (A) 1.005 - 1.030    Blood, UA Negative Negative    pH, Urine 7.0 5.0 - 8.0    Protein, POC Negative Negative mg/dL    Urobilinogen, UA Normal Normal    Leukocytes 500 Genoveva/ul (A) Negative    Nitrite, UA Negative Negative       I have personally reviewed the above labs. Pt follows with PCP.    Assessment/Plan     1.  migraine headaches: Patient had failed multiple medicationsshe had Botox and states that she has reduced her " migraines from 15 monthly to 8 and this was during a head cold.  Patient very pleased with current treatment will have her reschedule for Botox December 17.

## 2018-11-27 RX ORDER — LORAZEPAM 1 MG/1
TABLET ORAL
Qty: 45 TABLET | Refills: 0 | Status: SHIPPED | OUTPATIENT
Start: 2018-11-27 | End: 2018-12-20 | Stop reason: SDUPTHER

## 2018-12-20 RX ORDER — LORAZEPAM 1 MG/1
TABLET ORAL
Qty: 45 TABLET | Refills: 0 | Status: SHIPPED | OUTPATIENT
Start: 2018-12-20 | End: 2019-01-15 | Stop reason: SDUPTHER

## 2019-01-15 ENCOUNTER — OFFICE VISIT (OUTPATIENT)
Dept: PSYCHIATRY | Facility: CLINIC | Age: 64
End: 2019-01-15

## 2019-01-15 VITALS — BODY MASS INDEX: 23.43 KG/M2 | HEIGHT: 69 IN | WEIGHT: 158.2 LBS

## 2019-01-15 DIAGNOSIS — F51.05 INSOMNIA DUE TO MENTAL CONDITION: ICD-10-CM

## 2019-01-15 DIAGNOSIS — F41.1 GAD (GENERALIZED ANXIETY DISORDER): Primary | ICD-10-CM

## 2019-01-15 DIAGNOSIS — F33.1 MAJOR DEPRESSIVE DISORDER, RECURRENT, MODERATE (HCC): ICD-10-CM

## 2019-01-15 PROCEDURE — 99213 OFFICE O/P EST LOW 20 MIN: CPT | Performed by: NURSE PRACTITIONER

## 2019-01-15 RX ORDER — LORAZEPAM 1 MG/1
TABLET ORAL
Qty: 45 TABLET | Refills: 0 | Status: SHIPPED | OUTPATIENT
Start: 2019-01-15 | End: 2019-02-20 | Stop reason: SDUPTHER

## 2019-01-15 NOTE — PROGRESS NOTES
"    Subjective   Leticia Mamie Arreguin is a 63 y.o. female who is here today for medication management follow up.    Chief Complaint:     INDIRA (generalized anxiety disorder)    Major depressive disorder, recurrent, moderate (CMS/HCC)    Insomnia due to mental condition        History of Present Illness Patient presents by herself for f/u. Anxiety 2 depression 2. Denies issues, doesn't sleep well, never has, lorazpeam helps. She cont to care for children and friends, getting along with family members and \"I stay really busy\".  She reports has she has joint and muscle pain in left side of face and will start medrol pac via her dentist who also had her wear a mouth piece for joint TMJ.  .  Denies adverse effects from medications.   (Scales based on 0 - 10 with 10 being the worst)        The following portions of the patient's history were reviewed and updated as appropriate: allergies, current medications, past family history, past medical history, past social history, past surgical history and problem list.    Review of Systemsdenies fever, cough, s/s’s of infection, denies GI/ problems, new TMJ pain      Objective   Physical Exam  Height 175.3 cm (69\"), weight 71.8 kg (158 lb 3.2 oz), not currently breastfeeding.    Allergies   Allergen Reactions   • Sulfa Antibiotics Anaphylaxis   • Sulfamethoxazole-Trimethoprim Anaphylaxis   • Statins Myalgia   • Ciprofloxacin Rash   • Clarithromycin Rash   • Gabapentin Rash   • Nitrofurantoin Nausea Only   • Penicillins Rash   • Risperidone Rash   • Zyprexa [Olanzapine] Other (See Comments)     Extreme agitation       Current Medications:   Current Outpatient Medications   Medication Sig Dispense Refill   • Glucosamine-Chondroitin (OSTEO BI-FLEX REGULAR STRENGTH PO) Take  by mouth.     • guaiFENesin (MUCINEX) 600 MG 12 hr tablet Take 1,200 mg by mouth 2 (Two) Times a Day.     • ibuprofen (ADVIL,MOTRIN) 200 MG tablet Take 200 mg by mouth Every 6 (Six) Hours As Needed for Mild Pain .   "   • LORazepam (ATIVAN) 1 MG tablet 1-1-1/2 tabs by mouth nightly as needed for anxiety/insomnia 45 tablet 0   • Magnesium 100 MG capsule Take  by mouth.     • Multiple Vitamins-Minerals (MULTIVITAMIN ADULT EXTRA C PO) multivitamin   1 po QDay     • omeprazole (priLOSEC) 40 MG capsule TAKE ONE CAPSULE BY MOUTH EVERY DAY BEFORE A MEAL  4   • OnabotulinumtoxinA 200 units reconstituted solution INJECT 155 UNITS IM TO HEAD AND NECK AREA EVERY 12 WEEKS. DISCARD UNUSED PORTIONS 1 each 2   • Probiotic Product (PROBIOTIC-10) capsule Take  by mouth.       No current facility-administered medications for this visit.        Appearance: appropriate  Hygiene:   good  Cooperation:  Cooperative  Eye Contact:  Good  Psychomotor Behavior:  No psychomotor agitation/retardation, No EPS, No motor tics  Mood:  within normal limits  Affect:  Appropriate  Hopelessness: Denies  Speech:  Normal  Thought Process:  Linear  Thought Content:  Normal  Concentration: Normal   Suicidal:  None  Homicidal:  None  Hallucinations:  None  Delusion:  None  Memory:  Intact  Orientation:  Person, Place, Time and Situation  Reliability:  fair  Insight:  Fair  Judgement:  Fair  Impulse Control:  Fair  Estimated Intelligence: average range    MARIBEL REVIEWED NO RED FLAGS      Assessment/Plan   Diagnoses and all orders for this visit:    INDIRA (generalized anxiety disorder)    Major depressive disorder, recurrent, moderate (CMS/HCC)    Insomnia due to mental condition    Other orders  -     LORazepam (ATIVAN) 1 MG tablet; 1-1-1/2 tabs by mouth nightly as needed for anxiety/insomnia        IMPRESSION: stable mood  PLAN:   Refill lorazepam  Takes at bedtime to help with sleep  MARIBEL no red flags      We discussed risks, benefits, and side effects of the above medications and the patient was agreeable with the plan. Patient was educated on the importance of compliance with treatment and follow-up appointments.     Counseled patient regarding multimodal approach  with healthy nutrition, healthy sleep, regular physical activity, social activities, counseling, and medications. Encouraged to practice lateral sleep position. Avoid alcohol and stimulants ie caffeine close to bedtime.      Coping skills reviewed and encouraged positive framing of thoughts    Assisted patient in processing above session content; acknowledged and normalized patient’s thoughts, feelings, and concerns.  Applied  positive coping skills and behavior management in session.  Allowed patient to freely discuss issues without interruption or judgment. Provided safe, confidential environment to facilitate the development of positive therapeutic relationship and encourage open, honest communication. Assisted patient in identifying risk factors which would indicate the need for higher level of care including thoughts to harm self or others and/or self-harming behavior and encouraged patient to contact this office, call 911, or present to the nearest emergency room should any of these events occur. Discussed crisis intervention services and means to access.  Patient adamantly and convincingly denies current suicidal or homicidal ideation or perceptual disturbance.    Treatment Plan: stabilize mood, patient will stay out of the hospital and be at optimal level of functioning, take all medication as prescribed. Patient verbalized  understanding and agreement to plan.    Instructed to call for questions or concerns and return early if necessary. Crisis plan reviewed including going to the Emergency department.    Return in about 4 months (around 5/15/2019).

## 2019-02-20 RX ORDER — LORAZEPAM 1 MG/1
TABLET ORAL
Qty: 45 TABLET | Refills: 0 | Status: SHIPPED | OUTPATIENT
Start: 2019-02-20 | End: 2019-03-26 | Stop reason: SDUPTHER

## 2019-03-26 RX ORDER — LORAZEPAM 1 MG/1
TABLET ORAL
Qty: 45 TABLET | Refills: 0 | Status: SHIPPED | OUTPATIENT
Start: 2019-03-26 | End: 2019-04-24 | Stop reason: SDUPTHER

## 2019-04-09 ENCOUNTER — OFFICE VISIT (OUTPATIENT)
Dept: OBSTETRICS AND GYNECOLOGY | Facility: CLINIC | Age: 64
End: 2019-04-09

## 2019-04-09 VITALS
SYSTOLIC BLOOD PRESSURE: 122 MMHG | WEIGHT: 154 LBS | BODY MASS INDEX: 22.81 KG/M2 | DIASTOLIC BLOOD PRESSURE: 66 MMHG | HEIGHT: 69 IN

## 2019-04-09 DIAGNOSIS — N94.10 DYSPAREUNIA IN FEMALE: ICD-10-CM

## 2019-04-09 DIAGNOSIS — R63.5 WEIGHT GAIN: ICD-10-CM

## 2019-04-09 DIAGNOSIS — N95.2 POSTMENOPAUSAL ATROPHIC VAGINITIS: Primary | ICD-10-CM

## 2019-04-09 PROCEDURE — 99214 OFFICE O/P EST MOD 30 MIN: CPT | Performed by: OBSTETRICS & GYNECOLOGY

## 2019-04-09 RX ORDER — TRIAMTERENE AND HYDROCHLOROTHIAZIDE 37.5; 25 MG/1; MG/1
1 TABLET ORAL DAILY
Refills: 3 | COMMUNITY
Start: 2019-02-20 | End: 2020-07-23

## 2019-04-09 NOTE — PROGRESS NOTES
Subjective  Chief Complaint   Patient presents with   • Vaginal Pain     Patient advised stopped using E-strig in October per PCP, is having vaginal pain and dryness.      Patient is 63 y.o.  here for evaluation of vaginal dryness and irritation.  Patient is also having vaginal pain as well as dyspareunia.  Been on Estring for several years.  Patient reports resolution of her symptoms while using the Estring.  Patient recently saw her primary care physician in October.  Patient reports she was informed to discontinue the Estring.  Patient is uncertain regarding why it was recommended that she discontinue the medication.  Patient reports since that time she has had severe vaginal dryness and irritation patient reports the onset of symptoms shortly thereafter.  Patient reports symptoms have progressively worsened.  Patient also is having dyspareunia.  Patient has used patient reports she is now unable to have vaginal penetration secondary to the pain.  Patient has had no postmenopausal bleeding.  Patient does have a family history of ovarian cancer in her mother.  Patient also has a family history of breast cancer in a sister.  Patient also has complaints of weight gain.  Patient reports she has had no changes in her eating habits.  Reports she exercises on a regular basis.  Patient reports the weight gain has been disturbing in nature.  Patient has not had any recent laboratory testing.  Patient reports weight gain over the last year.  Patient reports discussing this with her primary care physician.    History  Past Medical History:   Diagnosis Date   • Anxiety    • Asthma     AS CHILD   • Colonic polyp    • Depression    • Diverticulosis    • Dyslipidemia    • Electrocardiogram finding, abnormal, without diagnosis    • Elevated LFTs    • Esophageal reflux    • Fall    • Female pelvic pain    • Fibromyalgia    • Fracture    • GERD (gastroesophageal reflux disease)    • Gestational diabetes    • H/O mammogram     • Headache    • History of blood transfusion    • History of Holter monitoring     Findings were normal..    • Hypertension    • Insomnia    • Joint pain    • Migraine    • Osteoarthritis    • Ovarian cyst    • Pain in joint of left hip    • Recurrent UTI (urinary tract infection)    • Rheumatic fever    • Shoulder sprain    • Swelling of ankle joint    • Tinnitus    • Vaginitis    • Vitamin B12 deficiency    • Vitamin D deficiency disease      Current Outpatient Medications on File Prior to Visit   Medication Sig Dispense Refill   • Glucosamine-Chondroitin (OSTEO BI-FLEX REGULAR STRENGTH PO) Take  by mouth.     • guaiFENesin (MUCINEX) 600 MG 12 hr tablet Take 1,200 mg by mouth 2 (Two) Times a Day.     • ibuprofen (ADVIL,MOTRIN) 200 MG tablet Take 200 mg by mouth Every 6 (Six) Hours As Needed for Mild Pain .     • LORazepam (ATIVAN) 1 MG tablet 1-1-1/2 tabs by mouth nightly as needed for anxiety/insomnia 45 tablet 0   • Multiple Vitamins-Minerals (MULTIVITAMIN ADULT EXTRA C PO) multivitamin   1 po QDay     • omeprazole (priLOSEC) 40 MG capsule TAKE ONE CAPSULE BY MOUTH EVERY DAY BEFORE A MEAL  4   • Probiotic Product (PROBIOTIC-10) capsule Take  by mouth.     • triamterene-hydrochlorothiazide (MAXZIDE-25) 37.5-25 MG per tablet Take 1 tablet by mouth Daily.  3   • [DISCONTINUED] Magnesium 100 MG capsule Take  by mouth.     • [DISCONTINUED] OnabotulinumtoxinA 200 units reconstituted solution INJECT 155 UNITS IM TO HEAD AND NECK AREA EVERY 12 WEEKS. DISCARD UNUSED PORTIONS 1 each 2     No current facility-administered medications on file prior to visit.      Allergies   Allergen Reactions   • Sulfa Antibiotics Anaphylaxis   • Sulfamethoxazole-Trimethoprim Anaphylaxis   • Statins Myalgia   • Ciprofloxacin Rash   • Clarithromycin Rash   • Gabapentin Rash   • Nitrofurantoin Nausea Only   • Penicillins Rash   • Risperidone Rash   • Zyprexa [Olanzapine] Other (See Comments)     Extreme agitation     Past Surgical History:    Procedure Laterality Date   • ABDOMINAL SURGERY     • APPENDECTOMY     • CHOLECYSTECTOMY     • COLONOSCOPY      08/06/2016 DR. RAY   • DILATION AND CURETTAGE, DIAGNOSTIC / THERAPEUTIC     • ELECTROCONVULSIVE THERAPY Bilateral 10/6/2016    Procedure: BIFRONTAL ELECTROCONVULSIVE THERAPY;  Surgeon: Omi Foss MD;  Location:  [facility];  Service:    • ELECTROCONVULSIVE THERAPY Bilateral 10/11/2016    Procedure: BIFRONTAL ELECTROCONVULSIVE THERAPY;  Surgeon: Omi Foss MD;  Location:  [facility];  Service:    • ELECTROCONVULSIVE THERAPY Bilateral 10/17/2016    Procedure: BIFRONTAL ELECTROCONVULSIVE THERAPY;  Surgeon: Omi Foss MD;  Location:  [facility];  Service:    • ELECTROCONVULSIVE THERAPY Bilateral 10/28/2016    Procedure: ELECTROCONVULSIVE THERAPY;  Surgeon: Omi Foss MD;  Location:  [facility];  Service:    • ELECTROCONVULSIVE THERAPY Bilateral 11/1/2016    Procedure: ELECTROCONVULSIVE THERAPY;  Surgeon: Omi Foss MD;  Location:  [facility];  Service:    • ELECTROCONVULSIVE THERAPY Bilateral 11/3/2016    Procedure: ELECTROCONVULSIVE THERAPY;  Surgeon: Omi Foss MD;  Location: [facility];  Service:    • FRACTURE SURGERY     • HIP SURGERY      LEFT HIP REVISION DONOR BONE 06/03/2014   • JOINT REPLACEMENT     • TONSILLECTOMY     • TOTAL HIP ARTHROPLASTY Bilateral     LEFT-2010   RIGHT-2003   • TOTAL HIP ARTHROPLASTY  2004,2011,2015   • WRIST SURGERY       Family History   Problem Relation Age of Onset   • Arthritis Mother    • Ovarian cancer Mother    • Stomach cancer Mother    • Migraines Mother    • Mental illness Mother    • Depression Mother    • Cancer Mother    • Arthritis Father    • Seizures Father         EPILEPSY   • Hypertension Father    • Osteoporosis Father    • Mental illness Father    • Hyperlipidemia Father    • Stroke Father    • Depression Father    • Arthritis Sister    • Cancer Sister    • Migraines Sister    •  "Breast cancer Sister    • Cancer Other    • Diabetes Other    • Arthritis Other    • ADD / ADHD Neg Hx    • Alcohol abuse Neg Hx    • Anxiety disorder Neg Hx    • Bipolar disorder Neg Hx    • Dementia Neg Hx    • Drug abuse Neg Hx    • Paranoid behavior Neg Hx    • Schizophrenia Neg Hx    • Suicide Attempts Neg Hx      Social History     Socioeconomic History   • Marital status:      Spouse name: Not on file   • Number of children: Not on file   • Years of education: Not on file   • Highest education level: Not on file   Tobacco Use   • Smoking status: Never Smoker   • Smokeless tobacco: Never Used   Substance and Sexual Activity   • Alcohol use: No   • Drug use: No   • Sexual activity: Yes     Partners: Male     Birth control/protection: Post-menopausal     Review of Systems  All systems were reviewed and negative except for:  Genitourinary: postivie for  vaginal pain and dryness.      Objective  Vitals:    04/09/19 0946   BP: 122/66   Weight: 69.9 kg (154 lb)   Height: 175.3 cm (69\")     Physical Exam:  General Appearance: alert, appears stated age and cooperative  Head: normocephalic, without obvious abnormality and atraumatic  Eyes: lids and lashes normal, conjunctivae and sclerae normal, no icterus, no pallor, corneas clear and PERRLA  Ears: ears appear intact with no abnormalities noted  Nose: nares normal, septum midline, mucosa normal and no drainage  Neck: suppple, trachea midline and no thyromegaly  Lungs: clear to auscultation, respirations regular, respirations even and respirations unlabored  Heart: regular rhythm and normal rate, normal S1, S2, no murmur, gallop, or rubs and no click  Breasts: Not performed.  Abdomen: normal bowel sounds, no masses, no hepatomegaly, no splenomegaly, soft non-tender, no guarding and no rebound tenderness  Pelvic: Clinical staff was present for exam  External genitalia:  normal appearance of the external genitalia including Bartholin's and Parnell's glands.  :  " urethral meatus normal;  Vaginal:  atrophic mucosal changes are present;  Cervix:  normal appearance.  Uterus:  normal size, shape and consistency.  Adnexa:  non palpable bilaterally.  Extremities: moves extremities well, no edema, no cyanosis and no redness  Skin: no bleeding, bruising or rash and no lesions noted  Lymph Nodes: no palpable adenopathy  Neuro: CN II-X grossly intact; sensation intact  Psych: normal mood and affect, oriented to person, time and place, thought content organized and appropriate judgment  Lab Review   No data reviewed    Imaging   No data reviewed    Assessment/Plan  Problem List Items Addressed This Visit     None      Visit Diagnoses     Postmenopausal atrophic vaginitis    -  Primary New  Discussed various options for relief of atrophic vaginal symptoms related to menopause. Discussed local therapy for treatment of vaginal symptoms only.  Discussed the different formulation options including cream, ring, and tablets.  Discussed the low risk of systemic absorption in postmenopausal women with atrophy using 25 mcgs of estradiol on a daily basis.  Recommend low dose use 2-3x/wk for maintenance of treatment.  Other treatment options were discussed including the use of water-based and silicone-based vaginal lubricants and moisturizers.  Also discussed was the FDA approved treatment option of ospemifene for moderate to severe dyspareunia.  The patient desires to resume Estring.  The patient reports she has a prescription at home.  The patient regarding the vaginal estrogen.  The patient has had no postmenopausal bleeding.  Patient is to resume her Estring as previously given.  The patient is to follow-up in 3 months with a transvaginal ultrasound to evaluate the endometrium.  The patient was informed regarding the low risk of systemic absorption is estrogen.  The patient was also informed regarding the risk of progesterone treatment.  Plan resume Estring at this time.  Follow-up in 3 months  as discussed.  Instructions and precautions have been given.    Relevant Orders    US Non-ob Transvaginal    Weight gain    New  We will check thyroid panel as noted today.  Patient is to call for the results.  Plan pending results.    Relevant Orders    Thyroid Panel With TSH    Dyspareunia in female    New  The patient is to resume her Estring as previously given.  The patient is to follow-up in 3 months.  The use of water-based lubricants.  Plan pending results.        Follow up as discussed/scheduled  This note was electronically signed.  Amanda Avila M.D.

## 2019-04-10 LAB
FT4I SERPL CALC-MCNC: 1.7 (ref 1.2–4.9)
T3RU NFR SERPL: 23 % (ref 24–39)
T4 SERPL-MCNC: 7.2 UG/DL (ref 4.5–12)
TSH SERPL DL<=0.005 MIU/L-ACNC: 2.21 UIU/ML (ref 0.45–4.5)

## 2019-04-24 RX ORDER — LORAZEPAM 1 MG/1
TABLET ORAL
Qty: 45 TABLET | Refills: 0 | Status: SHIPPED | OUTPATIENT
Start: 2019-04-24 | End: 2019-06-05

## 2019-05-14 ENCOUNTER — OFFICE VISIT (OUTPATIENT)
Dept: PSYCHIATRY | Facility: CLINIC | Age: 64
End: 2019-05-14

## 2019-05-14 VITALS — HEIGHT: 69 IN | WEIGHT: 154 LBS | BODY MASS INDEX: 22.81 KG/M2

## 2019-05-14 DIAGNOSIS — F51.05 INSOMNIA DUE TO MENTAL CONDITION: ICD-10-CM

## 2019-05-14 DIAGNOSIS — F41.1 GAD (GENERALIZED ANXIETY DISORDER): Primary | ICD-10-CM

## 2019-05-14 PROCEDURE — 99214 OFFICE O/P EST MOD 30 MIN: CPT | Performed by: NURSE PRACTITIONER

## 2019-05-14 RX ORDER — LAMOTRIGINE 25 MG/1
TABLET ORAL
Qty: 42 TABLET | Refills: 0 | Status: SHIPPED | OUTPATIENT
Start: 2019-05-14 | End: 2019-06-05

## 2019-05-14 NOTE — PROGRESS NOTES
Leticia Arreguin is a 63 y.o. female is here today for medication management follow-up.    Chief Complaint:      ICD-10-CM ICD-9-CM   1. INDIRA (generalized anxiety disorder) F41.1 300.02   2. 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 Pt is currently taking Ativan only to help with her sleep; states medication is not helping. Repots racing thoughts and feeling panicked in the am. Reports poor response to Trazodone and multiple SSRIs and SNRIs.     Pt reports the presence/absence of the following anxiety symptoms: (+) excessive worry, (-) excessive fear, (+) difficulty relaxing, (+) restlessness, (+) insomnia, (+) easily fatigued, (-) irritability, (+) poor concentration, (+) racing thoughts, and (+) panic episodes.     Pt reports the presence/absence of the following depressive symptoms: (-) depressed mood, (-) reduced appetite, (-) increased appetite, (+) poor concentration, (-) hopelessness, (-) worthlessness, (-) insomnia, (-) hypersomnia, (-) psychomotor agitation, (-) irritability, (-) anhedonia, (-) amotivation, (-) fatigue, (-) suicidal ideation, (-) suicidal plan, (-) suicidal intent, (-) recurrent thoughts about death, and (-) homicidal ideation.    The following portions of the patient's history were reviewed and updated as appropriate: allergies, current medications, past family history, past medical history, past social history, past surgical history and problem list.    Review of Systems;;  Review of Systems   Constitutional: Positive for fatigue and unexpected weight gain. Negative for activity change, appetite change and unexpected weight loss.   Respiratory: Negative.    Cardiovascular: Negative.  Negative for chest pain.   Gastrointestinal: Negative.  Negative for diarrhea, nausea and vomiting.   Genitourinary: Negative.    Musculoskeletal: Negative.    Skin: Negative for rash  "and bruise.   Neurological: Negative.  Negative for dizziness, seizures and speech difficulty.   Psychiatric/Behavioral: Positive for decreased concentration and sleep disturbance. Negative for agitation, behavioral problems, dysphoric mood, hallucinations, self-injury, suicidal ideas, negative for hyperactivity and stress. The patient is nervous/anxious.        Physical Exam;;  Physical Exam  Height 175.3 cm (69\"), weight 69.9 kg (154 lb), not currently breastfeeding.    Current Medications;;    Current Outpatient Medications:   •  Glucosamine-Chondroitin (OSTEO BI-FLEX REGULAR STRENGTH PO), Take  by mouth., Disp: , Rfl:   •  guaiFENesin (MUCINEX) 600 MG 12 hr tablet, Take 1,200 mg by mouth 2 (Two) Times a Day., Disp: , Rfl:   •  ibuprofen (ADVIL,MOTRIN) 200 MG tablet, Take 200 mg by mouth Every 6 (Six) Hours As Needed for Mild Pain ., Disp: , Rfl:   •  LORazepam (ATIVAN) 1 MG tablet, 1-1-1/2 tabs by mouth nightly as needed for anxiety/insomnia, Disp: 45 tablet, Rfl: 0  •  Multiple Vitamins-Minerals (MULTIVITAMIN ADULT EXTRA C PO), multivitamin  1 po QDay, Disp: , Rfl:   •  omeprazole (priLOSEC) 40 MG capsule, TAKE ONE CAPSULE BY MOUTH EVERY DAY BEFORE A MEAL, Disp: , Rfl: 4  •  Probiotic Product (PROBIOTIC-10) capsule, Take  by mouth., Disp: , Rfl:   •  triamterene-hydrochlorothiazide (MAXZIDE-25) 37.5-25 MG per tablet, Take 1 tablet by mouth Daily., Disp: , Rfl: 3  •  lamoTRIgine (LAMICTAL) 25 MG tablet, Take 1 tablet PO daily x 2 weeks then increase to 2 tablets PO daily, Disp: 42 tablet, Rfl: 0    Lab Results:   Office Visit on 04/09/2019   Component Date Value Ref Range Status   • TSH 04/09/2019 2.210  0.450 - 4.500 uIU/mL Final   • T4, Total 04/09/2019 7.2  4.5 - 12.0 ug/dL Final   • T3 Uptake 04/09/2019 23* 24 - 39 % Final   • Free Thyroxine Index 04/09/2019 1.7  1.2 - 4.9 Final       Mental Status Exam:   Hygiene:   good  Cooperation:  Cooperative  Eye Contact:  Good  Psychomotor Behavior:  " Appropriate  Mood:anxious  Affect:  Appropriate  Hopelessness: Denies  Speech:  Normal  Thought Process:  Goal directed  Thought Content:  Normal  Suicidal:  None  Homicidal:  None  Hallucinations:  None  Delusion:  None  Memory:  Intact  Orientation:  Person, place, and time  Reliability:  good  Insight:  Good  Judgement:  Good  Impulse Control:  Good  Physical/Medical Issues:  No         Assessment Plan;;  Diagnoses and all orders for this visit:    INDIRA (generalized anxiety disorder)  -     lamoTRIgine (LAMICTAL) 25 MG tablet; Take 1 tablet PO daily x 2 weeks then increase to 2 tablets PO daily    Insomnia due to mental condition    Pt agrees to taper off Ativan due to poor response    Add Lamotrigine     A psychological evaluation was conducted in order to assess past and current level of functioning. Areas assessed included, but were not limited to: perception of social support, perception of ability to face and deal with challenges in life (positive functioning), anxiety symptoms, depressive symptoms, perspective on beliefs/belief system, coping skills for stress, intelligence level,  Therapeutic rapport was established. Interventions conducted today were geared towards incorporating medication management along with support for continued therapy. Education was also provided as to the med management with this provider and what to expect in subsequent sessions.    We discussed risks, benefits,goals and side effects of the above medication and the patient was agreeable with the plan.Patient was educated on the importance of compliance with treatment and follow-up appointments. Patient is aware to contact the [facility] with any worsening of symptoms. To call for questions or concerns and return early if necessary. Patent is agreeable to go to the Emergency Department or call 911 should they begin SI/HI.     Treatment Plan: stabilize mood,  patient will stay out of the hospital and be at optimal level of  functioning, take all medication as prescribed. Patient verbalized  understanding and agreement to plan.    Return in about 4 weeks (around 6/11/2019).    Chantale Velazquez, ALTHEA, APRN, PMHNP-BC, FNP-C

## 2019-05-18 RX ORDER — TRIAMTERENE AND HYDROCHLOROTHIAZIDE 37.5; 25 MG/1; MG/1
TABLET ORAL
Qty: 90 TABLET | Refills: 3 | OUTPATIENT
Start: 2019-05-18

## 2019-06-05 ENCOUNTER — OFFICE VISIT (OUTPATIENT)
Dept: PSYCHIATRY | Facility: CLINIC | Age: 64
End: 2019-06-05

## 2019-06-05 VITALS — HEIGHT: 69 IN | WEIGHT: 150 LBS | BODY MASS INDEX: 22.22 KG/M2

## 2019-06-05 DIAGNOSIS — F51.05 INSOMNIA DUE TO MENTAL CONDITION: ICD-10-CM

## 2019-06-05 DIAGNOSIS — F41.1 GAD (GENERALIZED ANXIETY DISORDER): Primary | ICD-10-CM

## 2019-06-05 PROCEDURE — 99214 OFFICE O/P EST MOD 30 MIN: CPT | Performed by: NURSE PRACTITIONER

## 2019-06-05 RX ORDER — HYDROXYZINE PAMOATE 25 MG/1
CAPSULE ORAL
Qty: 60 CAPSULE | Refills: 0 | Status: SHIPPED | OUTPATIENT
Start: 2019-06-05 | End: 2019-06-06

## 2019-06-05 RX ORDER — LAMOTRIGINE 100 MG/1
100 TABLET ORAL DAILY
Qty: 30 TABLET | Refills: 0 | Status: SHIPPED | OUTPATIENT
Start: 2019-06-05 | End: 2019-06-27

## 2019-06-05 NOTE — PROGRESS NOTES
"Leticia Arreguin is a 63 y.o. female is here today for medication management follow-up.    Chief Complaint:      ICD-10-CM ICD-9-CM   1. INDIRA (generalized anxiety disorder) F41.1 300.02   2. 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 Pt is currently taking Lamotrigine 50 mg and is reporting some improvement in anxiety and mood symptoms; feels less emotional. Stats she is no longer taking Ativan at night. Reports difficulty with falling and staying asleep. States she feels like she can't \"slow her brain down\" and \"I feel buzzed all the time\", always on the go.     Pt reports the presence/absence of the following anxiety symptoms: (+) excessive worry, (-) excessive fear, (+) difficulty relaxing, (+) restlessness, (+) insomnia, (+) easily fatigued, (-) irritability, (+) poor concentration, (+) racing thoughts, and (+) panic episodes.     Pt reports the presence/absence of the following depressive symptoms: (-) depressed mood, (-) reduced appetite, (-) increased appetite, (+) poor concentration, (-) hopelessness, (-) worthlessness, (-) insomnia, (-) hypersomnia, (-) psychomotor agitation, (-) irritability, (-) anhedonia, (-) amotivation, (-) fatigue, (-) suicidal ideation, (-) suicidal plan, (-) suicidal intent, (-) recurrent thoughts about death, and (-) homicidal ideation.    The following portions of the patient's history were reviewed and updated as appropriate: allergies, current medications, past family history, past medical history, past social history, past surgical history and problem list.    Review of Systems;;  Review of Systems   Constitutional: Positive for fatigue and unexpected weight gain. Negative for activity change, appetite change and unexpected weight loss.   Respiratory: Negative.    Cardiovascular: Negative.  Negative for chest pain.   Gastrointestinal: Negative.  Negative " "for diarrhea, nausea and vomiting.   Genitourinary: Negative.    Musculoskeletal: Negative.    Skin: Negative for rash and bruise.   Neurological: Negative.  Negative for dizziness, seizures and speech difficulty.   Psychiatric/Behavioral: Positive for decreased concentration and sleep disturbance. Negative for agitation, behavioral problems, dysphoric mood, hallucinations, self-injury, suicidal ideas, negative for hyperactivity and stress. The patient is nervous/anxious.        Physical Exam;;  Physical Exam  Height 175.3 cm (69\"), weight 68 kg (150 lb), not currently breastfeeding.    Current Medications;;    Current Outpatient Medications:   •  Glucosamine-Chondroitin (OSTEO BI-FLEX REGULAR STRENGTH PO), Take  by mouth., Disp: , Rfl:   •  guaiFENesin (MUCINEX) 600 MG 12 hr tablet, Take 1,200 mg by mouth 2 (Two) Times a Day., Disp: , Rfl:   •  hydrOXYzine pamoate (VISTARIL) 25 MG capsule, Take 1 to 2 capsules PO qhs, Disp: 60 capsule, Rfl: 0  •  ibuprofen (ADVIL,MOTRIN) 200 MG tablet, Take 200 mg by mouth Every 6 (Six) Hours As Needed for Mild Pain ., Disp: , Rfl:   •  lamoTRIgine (LAMICTAL) 100 MG tablet, Take 1 tablet by mouth Daily., Disp: 30 tablet, Rfl: 0  •  Multiple Vitamins-Minerals (MULTIVITAMIN ADULT EXTRA C PO), multivitamin  1 po QDay, Disp: , Rfl:   •  omeprazole (priLOSEC) 40 MG capsule, TAKE ONE CAPSULE BY MOUTH EVERY DAY BEFORE A MEAL, Disp: , Rfl: 4  •  Probiotic Product (PROBIOTIC-10) capsule, Take  by mouth., Disp: , Rfl:   •  triamterene-hydrochlorothiazide (MAXZIDE-25) 37.5-25 MG per tablet, Take 1 tablet by mouth Daily., Disp: , Rfl: 3    Lab Results:       Mental Status Exam:   Hygiene:   good  Cooperation:  Cooperative  Eye Contact:  Good  Psychomotor Behavior:  Appropriate  Mood:anxious  Affect:  Appropriate  Hopelessness: Denies  Speech:  Normal  Thought Process:  Goal directed  Thought Content:  Normal  Suicidal:  None  Homicidal:  None  Hallucinations:  None  Delusion:  None  Memory:  " Intact  Orientation:  Person, place, and time  Reliability:  good  Insight:  Good  Judgement:  Good  Impulse Control:  Good  Physical/Medical Issues:  No         Assessment Plan;;  Diagnoses and all orders for this visit:    INDIRA (generalized anxiety disorder)  -     lamoTRIgine (LAMICTAL) 100 MG tablet; Take 1 tablet by mouth Daily.    Insomnia due to mental condition  -     hydrOXYzine pamoate (VISTARIL) 25 MG capsule; Take 1 to 2 capsules PO qhs    Increase Lamotrigine    Add Vistaril     A psychological evaluation was conducted in order to assess past and current level of functioning. Areas assessed included, but were not limited to: perception of social support, perception of ability to face and deal with challenges in life (positive functioning), anxiety symptoms, depressive symptoms, perspective on beliefs/belief system, coping skills for stress, intelligence level,  Therapeutic rapport was established. Interventions conducted today were geared towards incorporating medication management along with support for continued therapy. Education was also provided as to the med management with this provider and what to expect in subsequent sessions.    We discussed risks, benefits,goals and side effects of the above medication and the patient was agreeable with the plan.Patient was educated on the importance of compliance with treatment and follow-up appointments. Patient is aware to contact the [facility] with any worsening of symptoms. To call for questions or concerns and return early if necessary. Patent is agreeable to go to the Emergency Department or call 911 should they begin SI/HI.     Treatment Plan: stabilize mood,  patient will stay out of the hospital and be at optimal level of functioning, take all medication as prescribed. Patient verbalized  understanding and agreement to plan.    Return in about 3 weeks (around 6/26/2019) for Follow Up.    Chantale Velazquez, DNP, APRN, PMHNP-BC, FNP-C

## 2019-06-06 RX ORDER — HYDROXYZINE HYDROCHLORIDE 25 MG/1
TABLET, FILM COATED ORAL
Qty: 60 TABLET | Refills: 0 | Status: SHIPPED | OUTPATIENT
Start: 2019-06-06 | End: 2019-06-27 | Stop reason: SDUPTHER

## 2019-06-27 ENCOUNTER — OFFICE VISIT (OUTPATIENT)
Dept: PSYCHIATRY | Facility: CLINIC | Age: 64
End: 2019-06-27

## 2019-06-27 VITALS — BODY MASS INDEX: 22.07 KG/M2 | HEIGHT: 69 IN | WEIGHT: 149 LBS

## 2019-06-27 DIAGNOSIS — F51.05 INSOMNIA DUE TO MENTAL CONDITION: ICD-10-CM

## 2019-06-27 DIAGNOSIS — F41.1 GAD (GENERALIZED ANXIETY DISORDER): Primary | ICD-10-CM

## 2019-06-27 PROCEDURE — 90833 PSYTX W PT W E/M 30 MIN: CPT | Performed by: NURSE PRACTITIONER

## 2019-06-27 PROCEDURE — 99214 OFFICE O/P EST MOD 30 MIN: CPT | Performed by: NURSE PRACTITIONER

## 2019-06-27 RX ORDER — HYDROXYZINE HYDROCHLORIDE 25 MG/1
TABLET, FILM COATED ORAL
Qty: 60 TABLET | Refills: 2 | Status: SHIPPED | OUTPATIENT
Start: 2019-06-27 | End: 2019-11-01 | Stop reason: SDUPTHER

## 2019-06-27 RX ORDER — LAMOTRIGINE 100 MG/1
TABLET ORAL
Qty: 58 TABLET | Refills: 0 | Status: SHIPPED | OUTPATIENT
Start: 2019-06-27 | End: 2019-07-01 | Stop reason: SDUPTHER

## 2019-07-01 DIAGNOSIS — F41.1 GAD (GENERALIZED ANXIETY DISORDER): ICD-10-CM

## 2019-07-01 RX ORDER — LAMOTRIGINE 100 MG/1
TABLET ORAL
Qty: 58 TABLET | Refills: 0 | Status: CANCELLED | OUTPATIENT
Start: 2019-07-01

## 2019-07-01 RX ORDER — LAMOTRIGINE 100 MG/1
TABLET ORAL
Qty: 58 TABLET | Refills: 0 | Status: SHIPPED | OUTPATIENT
Start: 2019-07-01 | End: 2019-07-30 | Stop reason: SDUPTHER

## 2019-07-30 DIAGNOSIS — F41.1 GAD (GENERALIZED ANXIETY DISORDER): ICD-10-CM

## 2019-07-30 RX ORDER — LAMOTRIGINE 100 MG/1
TABLET ORAL
Qty: 60 TABLET | Refills: 2 | Status: SHIPPED | OUTPATIENT
Start: 2019-07-30 | End: 2019-11-05 | Stop reason: SDUPTHER

## 2019-07-30 RX ORDER — LAMOTRIGINE 100 MG/1
TABLET ORAL
Qty: 58 TABLET | Refills: 0 | Status: CANCELLED | OUTPATIENT
Start: 2019-07-30

## 2019-07-30 NOTE — TELEPHONE ENCOUNTER
Please verify with patient what dose she is taking ( if she increased to 100 mg BID) and does she want it sent to mail order pharmacy.

## 2019-08-13 ENCOUNTER — TELEPHONE (OUTPATIENT)
Dept: OBSTETRICS AND GYNECOLOGY | Facility: CLINIC | Age: 64
End: 2019-08-13

## 2019-08-13 NOTE — TELEPHONE ENCOUNTER
----- Message from Leela Hartmann sent at 8/13/2019  9:09 AM EDT -----  Contact: PT  THIS IS DR BOBBY'S PT.  SHE CALLED REQUESTING REFILLS ON ESTRING.  SHE USES [facility] IN [place].  THANKS

## 2019-08-28 ENCOUNTER — TELEPHONE (OUTPATIENT)
Dept: OBSTETRICS AND GYNECOLOGY | Facility: CLINIC | Age: 64
End: 2019-08-28

## 2019-08-28 RX ORDER — ESTRADIOL 10 UG/1
1 INSERT VAGINAL 2 TIMES WEEKLY
Qty: 8 TABLET | Refills: 11 | Status: SHIPPED | OUTPATIENT
Start: 2019-08-29 | End: 2019-09-28

## 2019-08-28 NOTE — TELEPHONE ENCOUNTER
Patient called and advised Estring is $450 copay, patient advised she doesn't want to do any cream, wanted to see if Vagifem is covered with insurance, is this okay to send in?

## 2019-09-04 ENCOUNTER — TRANSCRIBE ORDERS (OUTPATIENT)
Dept: ADMINISTRATIVE | Facility: HOSPITAL | Age: 64
End: 2019-09-04

## 2019-09-04 ENCOUNTER — HOSPITAL ENCOUNTER (OUTPATIENT)
Dept: ULTRASOUND IMAGING | Facility: HOSPITAL | Age: 64
Discharge: HOME | End: 2019-09-04
Admitting: FAMILY MEDICINE

## 2019-09-04 DIAGNOSIS — M79.604 BILATERAL LOWER EXTREMITY PAIN: ICD-10-CM

## 2019-09-04 DIAGNOSIS — M79.605 BILATERAL LOWER EXTREMITY PAIN: ICD-10-CM

## 2019-09-04 DIAGNOSIS — M79.604 RIGHT LEG PAIN: ICD-10-CM

## 2019-09-04 DIAGNOSIS — M79.605 BILATERAL LOWER EXTREMITY PAIN: Primary | ICD-10-CM

## 2019-09-04 DIAGNOSIS — M79.604 BILATERAL LOWER EXTREMITY PAIN: Primary | ICD-10-CM

## 2019-09-04 PROCEDURE — 93971 EXTREMITY STUDY: CPT

## 2019-11-01 DIAGNOSIS — F51.05 INSOMNIA DUE TO MENTAL CONDITION: ICD-10-CM

## 2019-11-01 RX ORDER — HYDROXYZINE HYDROCHLORIDE 25 MG/1
TABLET, FILM COATED ORAL
Qty: 180 TABLET | Refills: 0 | Status: SHIPPED | OUTPATIENT
Start: 2019-11-01 | End: 2020-02-11

## 2019-11-05 ENCOUNTER — OFFICE VISIT (OUTPATIENT)
Dept: PSYCHIATRY | Facility: CLINIC | Age: 64
End: 2019-11-05

## 2019-11-05 VITALS
HEART RATE: 81 BPM | SYSTOLIC BLOOD PRESSURE: 130 MMHG | BODY MASS INDEX: 22.81 KG/M2 | DIASTOLIC BLOOD PRESSURE: 64 MMHG | WEIGHT: 154 LBS | HEIGHT: 69 IN

## 2019-11-05 DIAGNOSIS — F43.12 NIGHTMARES ASSOCIATED WITH CHRONIC POST-TRAUMATIC STRESS DISORDER: ICD-10-CM

## 2019-11-05 DIAGNOSIS — F43.10 POST TRAUMATIC STRESS DISORDER (PTSD): ICD-10-CM

## 2019-11-05 DIAGNOSIS — F51.05 INSOMNIA DUE TO MENTAL CONDITION: ICD-10-CM

## 2019-11-05 DIAGNOSIS — F41.1 GAD (GENERALIZED ANXIETY DISORDER): Primary | ICD-10-CM

## 2019-11-05 DIAGNOSIS — F51.5 NIGHTMARES ASSOCIATED WITH CHRONIC POST-TRAUMATIC STRESS DISORDER: ICD-10-CM

## 2019-11-05 PROCEDURE — 90792 PSYCH DIAG EVAL W/MED SRVCS: CPT | Performed by: NURSE PRACTITIONER

## 2019-11-05 RX ORDER — ZOLPIDEM TARTRATE 5 MG/1
5 TABLET ORAL NIGHTLY PRN
Qty: 14 TABLET | Refills: 0 | Status: SHIPPED | OUTPATIENT
Start: 2019-11-05 | End: 2019-11-19 | Stop reason: SDUPTHER

## 2019-11-05 RX ORDER — AMOXICILLIN AND CLAVULANATE POTASSIUM 875; 125 MG/1; MG/1
TABLET, FILM COATED ORAL EVERY 12 HOURS
COMMUNITY
End: 2019-12-12

## 2019-11-05 RX ORDER — LAMOTRIGINE 100 MG/1
TABLET ORAL
Qty: 60 TABLET | Refills: 2 | Status: SHIPPED | OUTPATIENT
Start: 2019-11-05 | End: 2019-12-12 | Stop reason: SDUPTHER

## 2019-11-07 ENCOUNTER — TRANSCRIBE ORDERS (OUTPATIENT)
Dept: ADMINISTRATIVE | Facility: HOSPITAL | Age: 64
End: 2019-11-07

## 2019-11-07 ENCOUNTER — TELEPHONE (OUTPATIENT)
Dept: PSYCHIATRY | Facility: CLINIC | Age: 64
End: 2019-11-07

## 2019-11-07 DIAGNOSIS — R10.9 ABDOMINAL PAIN, UNSPECIFIED ABDOMINAL LOCATION: Primary | ICD-10-CM

## 2019-11-07 RX ORDER — PRAZOSIN HYDROCHLORIDE 1 MG/1
1 CAPSULE ORAL NIGHTLY
Qty: 30 CAPSULE | Refills: 0 | Status: SHIPPED | OUTPATIENT
Start: 2019-11-07 | End: 2019-11-27 | Stop reason: SDUPTHER
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
## 2019-11-11 ENCOUNTER — TRANSCRIBE ORDERS (OUTPATIENT)
Dept: MAMMOGRAPHY | Facility: HOSPITAL | Age: 64
End: 2019-11-11

## 2019-11-11 DIAGNOSIS — Z12.39 BREAST CANCER SCREENING: Primary | ICD-10-CM

## 2019-11-13 ENCOUNTER — LAB (OUTPATIENT)
Dept: LAB | Facility: HOSPITAL | Age: 64
End: 2019-11-13

## 2019-11-13 ENCOUNTER — HOSPITAL ENCOUNTER (OUTPATIENT)
Dept: CT IMAGING | Facility: HOSPITAL | Age: 64
Discharge: HOME | End: 2019-11-13
Admitting: FAMILY MEDICINE

## 2019-11-13 DIAGNOSIS — R10.9 ABDOMINAL PAIN, UNSPECIFIED ABDOMINAL LOCATION: ICD-10-CM

## 2019-11-13 LAB — CREAT BLDA-MCNC: 0.7 MG/DL (ref 0.6–1.3)

## 2019-11-13 PROCEDURE — 82565 ASSAY OF CREATININE: CPT

## 2019-11-13 PROCEDURE — 74177 CT ABD & PELVIS W/CONTRAST: CPT

## 2019-11-13 PROCEDURE — 25010000002 IOPAMIDOL 61 % SOLUTION: Performed by: FAMILY MEDICINE

## 2019-11-13 RX ADMIN — IOPAMIDOL 100 ML: 612 INJECTION, SOLUTION INTRAVENOUS at 13:48

## 2019-11-19 DIAGNOSIS — F51.05 INSOMNIA DUE TO MENTAL CONDITION: ICD-10-CM

## 2019-11-19 RX ORDER — ZOLPIDEM TARTRATE 5 MG/1
5 TABLET ORAL NIGHTLY PRN
Qty: 30 TABLET | Refills: 0 | Status: SHIPPED | OUTPATIENT
Start: 2019-11-19 | End: 2019-12-12

## 2019-11-19 RX ORDER — ZOLPIDEM TARTRATE 5 MG/1
5 TABLET ORAL NIGHTLY PRN
Qty: 14 TABLET | Refills: 0 | Status: CANCELLED | OUTPATIENT
Start: 2019-11-19

## 2019-11-27 DIAGNOSIS — F51.5 NIGHTMARES ASSOCIATED WITH CHRONIC POST-TRAUMATIC STRESS DISORDER: ICD-10-CM

## 2019-11-27 DIAGNOSIS — F43.12 NIGHTMARES ASSOCIATED WITH CHRONIC POST-TRAUMATIC STRESS DISORDER: ICD-10-CM

## 2019-11-27 RX ORDER — PRAZOSIN HYDROCHLORIDE 1 MG/1
CAPSULE ORAL
Qty: 30 CAPSULE | Refills: 0 | Status: SHIPPED | OUTPATIENT
Start: 2019-11-27 | End: 2019-12-12 | Stop reason: SINTOL

## 2019-12-12 ENCOUNTER — OFFICE VISIT (OUTPATIENT)
Dept: PSYCHIATRY | Facility: CLINIC | Age: 64
End: 2019-12-12

## 2019-12-12 VITALS
SYSTOLIC BLOOD PRESSURE: 128 MMHG | DIASTOLIC BLOOD PRESSURE: 68 MMHG | HEIGHT: 69 IN | WEIGHT: 149 LBS | HEART RATE: 80 BPM | BODY MASS INDEX: 22.07 KG/M2

## 2019-12-12 DIAGNOSIS — F43.10 POST TRAUMATIC STRESS DISORDER (PTSD): ICD-10-CM

## 2019-12-12 DIAGNOSIS — F51.05 INSOMNIA DUE TO MENTAL CONDITION: Primary | ICD-10-CM

## 2019-12-12 DIAGNOSIS — F41.1 GAD (GENERALIZED ANXIETY DISORDER): ICD-10-CM

## 2019-12-12 PROCEDURE — 99214 OFFICE O/P EST MOD 30 MIN: CPT | Performed by: NURSE PRACTITIONER

## 2019-12-12 PROCEDURE — 90833 PSYTX W PT W E/M 30 MIN: CPT | Performed by: NURSE PRACTITIONER

## 2019-12-12 RX ORDER — ZOLPIDEM TARTRATE 6.25 MG/1
6.25 TABLET, FILM COATED, EXTENDED RELEASE ORAL NIGHTLY PRN
Qty: 30 TABLET | Refills: 0 | Status: SHIPPED | OUTPATIENT
Start: 2019-12-12 | End: 2020-01-07

## 2019-12-12 RX ORDER — LAMOTRIGINE 100 MG/1
TABLET ORAL
Qty: 60 TABLET | Refills: 2 | Status: SHIPPED | OUTPATIENT
Start: 2019-12-12 | End: 2020-02-11
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
## 2019-12-12 NOTE — PROGRESS NOTES
"Leticia Arreguin is a 63 y.o. female is here today for medication management follow-up.    Chief Complaint:      ICD-10-CM ICD-9-CM   1. 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 INDIRA (generalized anxiety disorder) F41.1 300.02   3. Post traumatic stress disorder (PTSD) F43.10 309.81       History of Present Illness:  Pt presents for follow up visit and medication management of anxiety symptoms. Pt is currently taking Hydroxyzine 50 mg and Ambien 5 mg for insomnia symptoms. States she will take Hydroxyzine first and then when she wakes up in a few hours she will take an Ambien 5 mg and sleep for another 2 to 3 hours. Pt reports that she was unable to schedule for DBT at the [facility] in [place] since they are not taking patients currently. Pt reports continued anxiety symptoms. Pt states that her medications do not need to be adjusted because she states \"no medication\" can help her symptoms. Pt continues to be fearful of medications based on her past experienced trauma with a psychiatrist. Pt also reports that her spouse, of 43 years is not emotionally supportive.She relates that he feels that she should just \"get over\" her past trauma. States that there is a lack of affection and support in her marriage.     Pt reports the presence/absence of the following anxiety symptoms: (+) excessive worry, (+) excessive fear, (+) difficulty relaxing, (+) restlessness, (+) insomnia, (+) easily fatigued, (-) irritability, (-) poor concentration, (+) racing thoughts, and (+) panic episodes.        The following portions of the patient's history were reviewed and updated as appropriate: allergies, current medications, past family history, past medical history, past social history, past surgical history and problem list.    Review of Systems;;  Review of Systems   Constitutional: Positive for fatigue. Negative for activity change, appetite change, unexpected weight gain and unexpected weight " "loss.   Respiratory: Negative.    Cardiovascular: Negative.  Negative for chest pain.   Gastrointestinal: Negative.  Negative for diarrhea, nausea and vomiting.   Genitourinary: Negative.    Musculoskeletal: Negative.    Skin: Negative for rash and bruise.   Neurological: Negative.  Negative for dizziness, seizures and speech difficulty.   Psychiatric/Behavioral: Positive for sleep disturbance. Negative for agitation, behavioral problems, decreased concentration, dysphoric mood, hallucinations, self-injury, suicidal ideas, negative for hyperactivity and stress. The patient is nervous/anxious.        Physical Exam;;  Physical Exam  Blood pressure 128/68, pulse 80, height 175.3 cm (69\"), weight 67.6 kg (149 lb), not currently breastfeeding.    Current Medications;;    Current Outpatient Medications:   •  Apple Cider Vinegar 188 MG capsule, apple cider vinegar  daily, Disp: , Rfl:   •  Caffeine-Magnesium Salicylate (DIUREX) .5 MG tablet, Diurex  1 po QDay, Disp: , Rfl:   •  Glucosamine-Chondroitin (OSTEO BI-FLEX REGULAR STRENGTH PO), Take  by mouth., Disp: , Rfl:   •  guaiFENesin (MUCINEX) 600 MG 12 hr tablet, Take 1,200 mg by mouth 2 (Two) Times a Day., Disp: , Rfl:   •  hydrOXYzine (ATARAX) 25 MG tablet, TAKE 1 TO 2 TABLETS BY MOUTH EVERY NIGHT AT BEDTIME, Disp: 180 tablet, Rfl: 0  •  ibuprofen (ADVIL,MOTRIN) 200 MG tablet, Take 200 mg by mouth Every 6 (Six) Hours As Needed for Mild Pain ., Disp: , Rfl:   •  lamoTRIgine (LaMICtal) 100 MG tablet, Take 1 tablet PO BID, Disp: 60 tablet, Rfl: 2  •  Multiple Vitamins-Minerals (MULTIVITAMIN ADULT EXTRA C PO), multivitamin  1 po QDay, Disp: , Rfl:   •  omeprazole (priLOSEC) 40 MG capsule, TAKE ONE CAPSULE BY MOUTH EVERY DAY BEFORE A MEAL, Disp: , Rfl: 4  •  Probiotic Product (PROBIOTIC-10) capsule, Take  by mouth., Disp: , Rfl:   •  triamterene-hydrochlorothiazide (MAXZIDE-25) 37.5-25 MG per tablet, Take 1 tablet by mouth Daily., Disp: , Rfl: 3  •  zolpidem CR (AMBIEN " CR) 6.25 MG CR tablet, Take 1 tablet by mouth At Night As Needed for Sleep., Disp: 30 tablet, Rfl: 0    Lab Results:       Mental Status Exam:   Hygiene:   good  Cooperation:  Cooperative  Eye Contact:  Good  Psychomotor Behavior:  Appropriate  Mood:anxious  Affect:  Appropriate  Hopelessness: Denies  Speech:  Normal  Thought Process:  Goal directed  Thought Content:  Normal  Suicidal:  None  Homicidal:  None  Hallucinations:  None  Delusion:  None  Memory:  Intact  Orientation:  Person, place, and time  Reliability:  good  Insight:  Good  Judgement:  Good  Impulse Control:  Good  Physical/Medical Issues:  No         Assessment Plan;;  Diagnoses and all orders for this visit:    Insomnia due to mental condition  -     zolpidem CR (AMBIEN CR) 6.25 MG CR tablet; Take 1 tablet by mouth At Night As Needed for Sleep.    INDIRA (generalized anxiety disorder)  -     lamoTRIgine (LaMICtal) 100 MG tablet; Take 1 tablet PO BID    Post traumatic stress disorder (PTSD)    Visit time: 1:00-1:35: 24 minutes spent in supportive psychotherapy. Pt openly discussed how her past trauma has negatively impacted her marriage; lack of her 's affection/support for her experiences/emotions and lack of her desire for intimacy and affection   with her spouse. Provided validation of her emotions related to past negative experiences and impact on her self-worth.      Treatment Plan           Problem: Anxiety/PTSD        Intervention: The prescription and adjustment of medications and monitoring of side effects. Continue current dose of Lamotrigine. Schedule therapy with ETHAN Prieto        Long term Goal: Overall improvement in mood and functioning per patient self -report        Short term Goal: Medication adherence. Improvement in symptoms per patient self-report.         Problem: Insomnia, Nightmares        Intervention: The prescription and adjustment of medications and monitoring of side effects. Change Ambien to Ambien  CR.        Long term Goal: Overall improvement in mood and functioning per patient self -report        Short term Goal: Medication adherence. Improvement in symptoms per patient self-report.      A psychological evaluation was conducted in order to assess past and current level of functioning. Areas assessed included, but were not limited to: perception of social support, perception of ability to face and deal with challenges in life (positive functioning), anxiety symptoms, depressive symptoms, perspective on beliefs/belief system, coping skills for stress, intelligence level,  Therapeutic rapport was established. Interventions conducted today were geared towards incorporating medication management along with support for continued therapy. Education was also provided as to the med management with this provider and what to expect in subsequent sessions.    We discussed risks, benefits,goals and side effects of the above medication and the patient was agreeable with the plan.Patient was educated on the importance of compliance with treatment and follow-up appointments. Patient is aware to contact the [facility] with any worsening of symptoms. To call for questions or concerns and return early if necessary. Patent is agreeable to go to the Emergency Department or call 911 should they begin SI/HI.     Return in about 3 months (around 3/12/2020) for Follow Up.    Chantael Velazquez, DNP, APRN, PMHNP-BC, FNP-C

## 2019-12-13 ENCOUNTER — TELEPHONE (OUTPATIENT)
Dept: PSYCHIATRY | Facility: CLINIC | Age: 64
End: 2019-12-13

## 2019-12-13 NOTE — TELEPHONE ENCOUNTER
PT CALLED SAYS SHE TOOK HYDROXYZINE AND AMBIEN LAST NIGHT AND THAT HER HEART IS POUNDING AND SHE FELT FUNNY..    I CONTACTED MIGUELINA HUNT ABOUT THE SITUATION, MIGUELINA ADVISED ME TO LET PT KNOW TO NOT BE DRIVING AND NOT TO TAKE HYDOXYZINE ANY MORE  AND THAT IF HER HEART WAS BEATING OVER 140 BPM TO GO TO THE EMERGENCY ROOM AND BE ASSESSED. ALSO SHE SAID HER SYMPTOMS SHOULD WEAR OFF SHORTLY IF NOT FOR HER TO GO TO THE EMERGENCY ROOM.     I ADVISED PT OF THIS AND SHE SAID SHE CHECK HER HEART RATE AND SEEK EMERGENCY HELP IF NEEDED. I TOLD HER TO MAKE SURE TO CHECK AND LET US KNOW IF SHE NEEDED ANYTHING ELSE.    MIGUELINA CALLED ME BACK TO INFORM MR TO CALL [facility] AND TELL HER NOT TO TAKE HER AMBIEN TONIGHT AND WAIT UNTIL TOMORROW NIGHT TO TAKE AGAIN.    I CALLED [facility] AND LET HER KNOW.

## 2020-01-02 ENCOUNTER — TELEPHONE (OUTPATIENT)
Dept: PSYCHIATRY | Facility: CLINIC | Age: 65
End: 2020-01-02

## 2020-01-02 NOTE — TELEPHONE ENCOUNTER
Called patient and offered appointment today but says she is out of town. Will put patient on the work in list. Advised patient to contact [facility] for insomnia .

## 2020-01-07 ENCOUNTER — OFFICE VISIT (OUTPATIENT)
Dept: PSYCHIATRY | Facility: CLINIC | Age: 65
End: 2020-01-07

## 2020-01-07 DIAGNOSIS — F43.12 NIGHTMARES ASSOCIATED WITH CHRONIC POST-TRAUMATIC STRESS DISORDER: ICD-10-CM

## 2020-01-07 DIAGNOSIS — F41.1 GAD (GENERALIZED ANXIETY DISORDER): ICD-10-CM

## 2020-01-07 DIAGNOSIS — F43.10 POST TRAUMATIC STRESS DISORDER (PTSD): Primary | ICD-10-CM

## 2020-01-07 DIAGNOSIS — F51.05 INSOMNIA DUE TO MENTAL CONDITION: ICD-10-CM

## 2020-01-07 DIAGNOSIS — F51.5 NIGHTMARES ASSOCIATED WITH CHRONIC POST-TRAUMATIC STRESS DISORDER: ICD-10-CM

## 2020-01-07 DIAGNOSIS — F33.1 MAJOR DEPRESSIVE DISORDER, RECURRENT, MODERATE (HCC): ICD-10-CM

## 2020-01-07 PROCEDURE — 90833 PSYTX W PT W E/M 30 MIN: CPT | Performed by: NURSE PRACTITIONER

## 2020-01-07 PROCEDURE — 99214 OFFICE O/P EST MOD 30 MIN: CPT | Performed by: NURSE PRACTITIONER

## 2020-01-07 RX ORDER — ZOLPIDEM TARTRATE 5 MG/1
5 TABLET ORAL NIGHTLY PRN
Qty: 30 TABLET | Refills: 0 | Status: SHIPPED | OUTPATIENT
Start: 2020-01-07 | End: 2020-01-30 | Stop reason: SDUPTHER

## 2020-01-07 RX ORDER — BUSPIRONE HYDROCHLORIDE 10 MG/1
TABLET ORAL
Qty: 57 TABLET | Refills: 0 | Status: SHIPPED | OUTPATIENT
Start: 2020-01-07 | End: 2020-02-11

## 2020-01-07 NOTE — PROGRESS NOTES
"Leticia Arreguin is a 64 y.o. female is here today for medication management follow-up.    Chief Complaint:      ICD-10-CM ICD-9-CM   1. Post traumatic stress disorder (PTSD) F43.10 309.81   2. INDIRA (generalized anxiety disorder) F41.1 300.02   3. 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 Nightmares associated with chronic post-traumatic stress disorder F51.5 307.47    F43.10 309.81   5. Major depressive disorder, recurrent, moderate (CMS/HCC) F33.1 296.32       History of Present Illness:  Pt presents for follow up visit and medication management of anxiety symptoms. Pt reports increase in anxiety symptoms and panic episodes since starting Ambien CR 6.25mg. Pt continues to struggle with insomnia symptoms. Pt continues to be fearful of medications based on her past experienced trauma with a psychiatrist. Pt continues to report that her spouse is not emotionally supportive. She states, \" I can't speak about things or express how I feel\". States she feels \"repressed not depressed\". States, \"I will never be good enough\". She relates that he feels that she should just \"get over\" her past trauma. States that there is a lack of affection and support in her marriage. Pt states she had to cancel her appointment for therapy because she had to babysit her granddaughter.     Pt reports the presence/absence of the following anxiety symptoms: (+) excessive worry, (+) excessive fear, (+) difficulty relaxing, (+) restlessness, (+) insomnia, (+) easily fatigued, (+) irritability, (+) poor concentration, (+) racing thoughts, and (+) panic episodes.       Pt reports the presence/absence of the following depressive symptoms: (+) depressed mood, (-) reduced appetite, (-) increased appetite, (-) poor concentration, (-) hopelessness, (-) worthlessness, (-) insomnia, (-) hypersomnia, (-) psychomotor agitation, (+) irritability, (+) anhedonia, (+) amotivation, (-) fatigue, (-) suicidal ideation, (-) suicidal plan, (-) " suicidal intent, (-) recurrent thoughts about death, and (-) homicidal ideation.    The following portions of the patient's history were reviewed and updated as appropriate: allergies, current medications, past family history, past medical history, past social history, past surgical history and problem list.    Review of Systems;;  Review of Systems   Constitutional: Positive for fatigue. Negative for activity change, appetite change, unexpected weight gain and unexpected weight loss.   Respiratory: Negative.    Cardiovascular: Negative.  Negative for chest pain.   Gastrointestinal: Negative.  Negative for diarrhea, nausea and vomiting.   Genitourinary: Negative.    Musculoskeletal: Negative.    Skin: Negative for rash and bruise.   Neurological: Positive for headache. Negative for dizziness, seizures and speech difficulty.   Psychiatric/Behavioral: Positive for dysphoric mood and sleep disturbance. Negative for agitation, behavioral problems, decreased concentration, hallucinations, self-injury, suicidal ideas, negative for hyperactivity and stress. The patient is nervous/anxious.        Physical Exam;;  Physical Exam  not currently breastfeeding.    Current Medications;;    Current Outpatient Medications:   •  Apple Cider Vinegar 188 MG capsule, apple cider vinegar  daily, Disp: , Rfl:   •  busPIRone (BUSPAR) 10 MG tablet, Take 1/2 tablet PO BID x 1 week then increase to 1 tablet PO BID, Disp: 57 tablet, Rfl: 0  •  Caffeine-Magnesium Salicylate (DIUREX) .5 MG tablet, Diurex  1 po QDay, Disp: , Rfl:   •  Glucosamine-Chondroitin (OSTEO BI-FLEX REGULAR STRENGTH PO), Take  by mouth., Disp: , Rfl:   •  guaiFENesin (MUCINEX) 600 MG 12 hr tablet, Take 1,200 mg by mouth 2 (Two) Times a Day., Disp: , Rfl:   •  hydrOXYzine (ATARAX) 25 MG tablet, TAKE 1 TO 2 TABLETS BY MOUTH EVERY NIGHT AT BEDTIME, Disp: 180 tablet, Rfl: 0  •  ibuprofen (ADVIL,MOTRIN) 200 MG tablet, Take 200 mg by mouth Every 6 (Six) Hours As Needed  for Mild Pain ., Disp: , Rfl:   •  lamoTRIgine (LaMICtal) 100 MG tablet, Take 1 tablet PO BID, Disp: 60 tablet, Rfl: 2  •  Multiple Vitamins-Minerals (MULTIVITAMIN ADULT EXTRA C PO), multivitamin  1 po QDay, Disp: , Rfl:   •  omeprazole (priLOSEC) 40 MG capsule, TAKE ONE CAPSULE BY MOUTH EVERY DAY BEFORE A MEAL, Disp: , Rfl: 4  •  Probiotic Product (PROBIOTIC-10) capsule, Take  by mouth., Disp: , Rfl:   •  triamterene-hydrochlorothiazide (MAXZIDE-25) 37.5-25 MG per tablet, Take 1 tablet by mouth Daily., Disp: , Rfl: 3  •  zolpidem (AMBIEN) 5 MG tablet, Take 1 tablet by mouth At Night As Needed for Sleep., Disp: 30 tablet, Rfl: 0    Lab Results:       Mental Status Exam:   Hygiene:   good  Cooperation:  Cooperative  Eye Contact:  Good  Psychomotor Behavior:  Appropriate  Mood:anxious  Affect:  Appropriate  Hopelessness: Denies  Speech:  Normal  Thought Process:  Goal directed  Thought Content:  Normal  Suicidal:  None  Homicidal:  None  Hallucinations:  None  Delusion:  None  Memory:  Intact  Orientation:  Person, place, and time  Reliability:  good  Insight:  Fair  Judgement:  Fair  Impulse Control:  Good  Physical/Medical Issues:  No         Assessment Plan;;  Diagnoses and all orders for this visit:    Post traumatic stress disorder (PTSD)    INDIRA (generalized anxiety disorder)  -     busPIRone (BUSPAR) 10 MG tablet; Take 1/2 tablet PO BID x 1 week then increase to 1 tablet PO BID    Insomnia due to mental condition  -     zolpidem (AMBIEN) 5 MG tablet; Take 1 tablet by mouth At Night As Needed for Sleep.    Nightmares associated with chronic post-traumatic stress disorder    Major depressive disorder, recurrent, moderate (CMS/HCC)    Therapy time: 11:10-11:35: 25 minutes spent in supportive psychotherapy. Pt openly discussed how her 's lack affection and support for her experiences/emotions has led to her feelings of  isolation. Provided validation of her feelings related to current and past negative  experiences and impact on her self-worth. Encouraged patient to identify negative emotions and behaviors. Provided psychoeducation regarding the importance of Cognitive Behavioral Therapy to address her cognitive distortions and help learn better ways of coping.           Treatment Plan           Problem: Anxiety/PTSD        Intervention: The prescription and adjustment of medications and monitoring of side effects. Add Buspirone Continue current dose of Lamotrigine. Reschedule therapy with [facility]        Long term Goal: Overall improvement in mood and functioning per patient self -report        Short term Goal: Medication adherence. Improvement in symptoms per patient self-report.         Problem: Insomnia, Nightmares        Intervention: The prescription and adjustment of medications and monitoring of side effects. Add Ambien. Discontinue Ambien CR.        Long term Goal: Overall improvement in mood and functioning per patient self -report        Short term Goal: Medication adherence. Improvement in symptoms per patient self-report.      A psychological evaluation was conducted in order to assess past and current level of functioning. Areas assessed included, but were not limited to: perception of social support, perception of ability to face and deal with challenges in life (positive functioning), anxiety symptoms, depressive symptoms, perspective on beliefs/belief system, coping skills for stress, intelligence level,  Therapeutic rapport was established. Interventions conducted today were geared towards incorporating medication management along with support for continued therapy. Education was also provided as to the med management with this provider and what to expect in subsequent sessions.    We discussed risks, benefits,goals and side effects of the above medication and the patient was agreeable with the plan.Patient was educated on the importance of compliance with treatment and follow-up  appointments. Patient is aware to contact the [facility] with any worsening of symptoms. To call for questions or concerns and return early if necessary. Patent is agreeable to go to the Emergency Department or call 911 should they begin SI/HI.     Return in about 4 weeks (around 2/4/2020) for Follow Up.    Chantale Velazquez, ALTHEA, APRN, PMHNP-BC, FNP-C

## 2020-01-27 DIAGNOSIS — F41.1 GAD (GENERALIZED ANXIETY DISORDER): ICD-10-CM

## 2020-01-27 RX ORDER — BUSPIRONE HYDROCHLORIDE 10 MG/1
TABLET ORAL
Qty: 57 TABLET | Refills: 0 | OUTPATIENT
Start: 2020-01-27

## 2020-01-30 DIAGNOSIS — F51.05 INSOMNIA DUE TO MENTAL CONDITION: ICD-10-CM

## 2020-01-30 RX ORDER — ZOLPIDEM TARTRATE 5 MG/1
5 TABLET ORAL NIGHTLY PRN
Qty: 30 TABLET | Refills: 0 | Status: SHIPPED | OUTPATIENT
Start: 2020-01-30 | End: 2020-02-11

## 2020-02-11 ENCOUNTER — OFFICE VISIT (OUTPATIENT)
Dept: PSYCHIATRY | Facility: CLINIC | Age: 65
End: 2020-02-11

## 2020-02-11 VITALS
HEIGHT: 69 IN | BODY MASS INDEX: 22.07 KG/M2 | WEIGHT: 149 LBS | DIASTOLIC BLOOD PRESSURE: 64 MMHG | HEART RATE: 72 BPM | SYSTOLIC BLOOD PRESSURE: 132 MMHG

## 2020-02-11 DIAGNOSIS — F41.1 GAD (GENERALIZED ANXIETY DISORDER): Primary | ICD-10-CM

## 2020-02-11 DIAGNOSIS — F43.10 POST TRAUMATIC STRESS DISORDER (PTSD): ICD-10-CM

## 2020-02-11 DIAGNOSIS — F51.05 INSOMNIA DUE TO MENTAL CONDITION: ICD-10-CM

## 2020-02-11 PROCEDURE — 99214 OFFICE O/P EST MOD 30 MIN: CPT | Performed by: NURSE PRACTITIONER

## 2020-02-11 PROCEDURE — 90833 PSYTX W PT W E/M 30 MIN: CPT | Performed by: NURSE PRACTITIONER

## 2020-02-11 RX ORDER — CYCLOBENZAPRINE HCL 10 MG
TABLET ORAL
Qty: 30 TABLET | Refills: 2 | Status: SHIPPED | OUTPATIENT
Start: 2020-02-11 | End: 2020-07-23

## 2020-02-11 NOTE — PROGRESS NOTES
Leticia Arreguin is a 64 y.o. female is here today for medication management follow-up.    Chief Complaint:      ICD-10-CM ICD-9-CM   1. INDIRA (generalized anxiety disorder) F41.1 300.02   2. Post traumatic stress disorder (PTSD) F43.10 309.81   3. 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 Pt reports that she had decided to taper off Lamotrigine, Buspirone, and Ambien. States she decided  to take a break from all medications; states she wanted to see if the medications were worsening her panic episodes. Reports that she does have muscle tension when she is having a panic episodes which exacerbates her pain from Fibromyalgia.  Continues to reports increase in stress related to her distant relationship with spouse, caring for her daughter's gravely ill dog, and concerns about her granddaughter behaviors. States her granddaughter, who is 7 years old, was singing a song about suicide and death in front of them. Pt relates that she would like to focus on psychotherapy as she feels that medications are not the appropriate at this time.     Pt reports the presence/absence of the following anxiety symptoms: (+) excessive worry, (+) excessive fear, (+) difficulty relaxing, (+) restlessness, (+) insomnia, (+) easily fatigued, (+) irritability, (+) poor concentration, (+) racing thoughts, and (+) panic episodes.       Pt reports the presence/absence of the following depressive symptoms: (+) depressed mood, (-) reduced appetite, (-) increased appetite, (-) poor concentration, (-) hopelessness, (-) worthlessness, (-) insomnia, (-) hypersomnia, (-) psychomotor agitation, (+) irritability, (+) anhedonia, (+) amotivation, (-) fatigue, (-) suicidal ideation, (-) suicidal plan, (-) suicidal intent, (-) recurrent thoughts about death, and (-) homicidal ideation.    The following portions of the patient's history  "were reviewed and updated as appropriate: allergies, current medications, past family history, past medical history, past social history, past surgical history and problem list.    Review of Systems;;  Review of Systems   Constitutional: Positive for fatigue. Negative for activity change, appetite change, unexpected weight gain and unexpected weight loss.   Respiratory: Negative.    Cardiovascular: Negative.  Negative for chest pain.   Gastrointestinal: Negative.  Negative for diarrhea, nausea and vomiting.   Genitourinary: Negative.    Musculoskeletal: Negative.    Skin: Negative for rash and bruise.   Neurological: Negative for dizziness, seizures, speech difficulty and headache.   Psychiatric/Behavioral: Positive for dysphoric mood, sleep disturbance and stress. Negative for agitation, behavioral problems, decreased concentration, hallucinations, self-injury, suicidal ideas and negative for hyperactivity. The patient is nervous/anxious.        Physical Exam;;  Physical Exam  Blood pressure 132/64, pulse 72, height 175.3 cm (69\"), weight 67.6 kg (149 lb), not currently breastfeeding.    Current Medications;;    Current Outpatient Medications:   •  Apple Cider Vinegar 188 MG capsule, apple cider vinegar  daily, Disp: , Rfl:   •  Caffeine-Magnesium Salicylate (DIUREX) .5 MG tablet, Diurex  1 po QDay, Disp: , Rfl:   •  cyclobenzaprine (FLEXERIL) 10 MG tablet, Take 1/2 to 1 tablet PO qhs prn muscle spasms, Disp: 30 tablet, Rfl: 2  •  Glucosamine-Chondroitin (OSTEO BI-FLEX REGULAR STRENGTH PO), Take  by mouth., Disp: , Rfl:   •  guaiFENesin (MUCINEX) 600 MG 12 hr tablet, Take 1,200 mg by mouth 2 (Two) Times a Day., Disp: , Rfl:   •  ibuprofen (ADVIL,MOTRIN) 200 MG tablet, Take 200 mg by mouth Every 6 (Six) Hours As Needed for Mild Pain ., Disp: , Rfl:   •  Multiple Vitamins-Minerals (MULTIVITAMIN ADULT EXTRA C PO), multivitamin  1 po QDay, Disp: , Rfl:   •  omeprazole (priLOSEC) 40 MG capsule, TAKE ONE CAPSULE BY " MOUTH EVERY DAY BEFORE A MEAL, Disp: , Rfl: 4  •  Probiotic Product (PROBIOTIC-10) capsule, Take  by mouth., Disp: , Rfl:   •  triamterene-hydrochlorothiazide (MAXZIDE-25) 37.5-25 MG per tablet, Take 1 tablet by mouth Daily., Disp: , Rfl: 3    Lab Results:       Mental Status Exam:   Hygiene:   good  Cooperation:  Cooperative  Eye Contact:  Good  Psychomotor Behavior:  Appropriate  Mood:anxious  Affect:  Appropriate  Hopelessness: Denies  Speech:  Normal  Thought Process:  Goal directed  Thought Content:  Normal  Suicidal:  None  Homicidal:  None  Hallucinations:  None  Delusion:  None  Memory:  Intact  Orientation:  Person, place, and time  Reliability:  good  Insight:  Fair  Judgement:  Fair  Impulse Control:  Good  Physical/Medical Issues:  No         Assessment Plan;;  Diagnoses and all orders for this visit:    INDIRA (generalized anxiety disorder)  -     cyclobenzaprine (FLEXERIL) 10 MG tablet; Take 1/2 to 1 tablet PO qhs prn muscle spasms    Post traumatic stress disorder (PTSD)    Insomnia due to mental condition        Therapy time: 10:30-10:50: 20 minutes spent in supportive psychotherapy. Pt openly discussed her current stressors.  Provided validation of her feelings related to current and past negative experiences and impact on her self-worth. Encouraged patient to identify negative emotions and behaviors. Provided psychoeducation regarding the importance of Cognitive Behavioral Therapy to address her cognitive distortions and help learn better ways of coping.         Treatment Plan           Problem: Anxiety/PTSD        Intervention: The prescription and adjustment of medications and monitoring of side effects. Add Flexeril prn.Pt decided to discontinue Lamotrigine, Buspirone, and Ambien.  Keep scheduled appointment for therapy with ETHAN Prieto        Long term Goal: Overall improvement in mood and functioning per patient self -report        Short term Goal: Medication adherence. Improvement in  symptoms per patient self-report.         A psychological evaluation was conducted in order to assess past and current level of functioning. Areas assessed included, but were not limited to: perception of social support, perception of ability to face and deal with challenges in life (positive functioning), anxiety symptoms, depressive symptoms, perspective on beliefs/belief system, coping skills for stress, intelligence level,  Therapeutic rapport was established. Interventions conducted today were geared towards incorporating medication management along with support for continued therapy. Education was also provided as to the med management with this provider and what to expect in subsequent sessions.    We discussed risks, benefits,goals and side effects of the above medication and the patient was agreeable with the plan.Patient was educated on the importance of compliance with treatment and follow-up appointments. Patient is aware to contact the [facility] with any worsening of symptoms. To call for questions or concerns and return early if necessary. Patent is agreeable to go to the Emergency Department or call 911 should they begin SI/HI.     Return if symptoms worsen or fail to improve, for Follow Up.    Chantale Velazquez, DNP, APRN, PMHNP-BC, FNP-C

## 2020-02-21 ENCOUNTER — OFFICE VISIT (OUTPATIENT)
Dept: OBSTETRICS AND GYNECOLOGY | Facility: CLINIC | Age: 65
End: 2020-02-21

## 2020-02-21 VITALS
BODY MASS INDEX: 22.07 KG/M2 | DIASTOLIC BLOOD PRESSURE: 60 MMHG | WEIGHT: 149 LBS | HEIGHT: 69 IN | SYSTOLIC BLOOD PRESSURE: 120 MMHG

## 2020-02-21 DIAGNOSIS — N94.10 DYSPAREUNIA IN FEMALE: ICD-10-CM

## 2020-02-21 DIAGNOSIS — N95.2 POSTMENOPAUSAL ATROPHIC VAGINITIS: Primary | ICD-10-CM

## 2020-02-21 DIAGNOSIS — N39.0 FREQUENT UTI: ICD-10-CM

## 2020-02-21 PROCEDURE — 99214 OFFICE O/P EST MOD 30 MIN: CPT | Performed by: OBSTETRICS & GYNECOLOGY

## 2020-02-21 RX ORDER — ZOLPIDEM TARTRATE 5 MG/1
TABLET ORAL
COMMUNITY
End: 2020-07-23

## 2020-02-21 RX ORDER — BUSPIRONE HYDROCHLORIDE 10 MG/1
TABLET ORAL
COMMUNITY
End: 2020-07-23

## 2020-02-21 NOTE — PROGRESS NOTES
Subjective  Chief Complaint   Patient presents with   • Vaginal atrophy     Patient advised has been off of vaginal estrogen therapy.      Patient is 64 y.o.  here for follow-up evaluation of her vaginal atrophy.  Patient had previously been on Estring in the past.  Patient had done well.  Patient reports her insurance company no longer covered.  Patient has been off of this medication for over 1 year.  Patient reports she has had marked vaginal dryness as well as itching and irritation.  Patient is also had significant dyspareunia.  Patient denies any vaginal bleeding or spotting.  Patient also reports she has had more frequent urinary tract infections during this time.  Patient is desiring to go back on medication.  Patient does not want to do the estrogen cream or Vagifem.  Patient reports she is only had improvement with the Estring.    History  Past Medical History:   Diagnosis Date   • Anxiety    • Asthma     AS CHILD   • Colonic polyp    • Depression    • Diverticulosis    • Dyslipidemia    • Electrocardiogram finding, abnormal, without diagnosis    • Elevated LFTs    • Esophageal reflux    • Fall    • Female pelvic pain    • Fibromyalgia    • Fracture    • GERD (gastroesophageal reflux disease)    • Gestational diabetes    • H/O mammogram    • Headache    • History of blood transfusion    • History of Holter monitoring     Findings were normal..    • Hypertension    • Insomnia    • Joint pain    • Migraine    • Osteoarthritis    • Ovarian cyst    • Pain in joint of left hip    • PTSD (post-traumatic stress disorder)    • Recurrent UTI (urinary tract infection)    • Rheumatic fever    • Shoulder sprain    • Swelling of ankle joint    • Tinnitus    • Vaginitis    • Vitamin B12 deficiency    • Vitamin D deficiency disease      Current Outpatient Medications on File Prior to Visit   Medication Sig Dispense Refill   • Apple Cider Vinegar 188 MG capsule apple cider vinegar   daily     • busPIRone (BUSPAR)  10 MG tablet buspirone 10 mg tablet   Weaning off     • Caffeine-Magnesium Salicylate (DIUREX) .5 MG tablet Diurex   1 po QDay     • cyclobenzaprine (FLEXERIL) 10 MG tablet Take 1/2 to 1 tablet PO qhs prn muscle spasms 30 tablet 2   • Glucosamine-Chondroitin (OSTEO BI-FLEX REGULAR STRENGTH PO) Take  by mouth.     • guaiFENesin (MUCINEX) 600 MG 12 hr tablet Take 1,200 mg by mouth 2 (Two) Times a Day.     • ibuprofen (ADVIL,MOTRIN) 200 MG tablet Take 200 mg by mouth Every 6 (Six) Hours As Needed for Mild Pain .     • Multiple Vitamins-Minerals (MULTIVITAMIN ADULT EXTRA C PO) multivitamin   1 po QDay     • omeprazole (priLOSEC) 40 MG capsule TAKE ONE CAPSULE BY MOUTH EVERY DAY BEFORE A MEAL  4   • Probiotic Product (PROBIOTIC-10) capsule Take  by mouth.     • triamterene-hydrochlorothiazide (MAXZIDE-25) 37.5-25 MG per tablet Take 1 tablet by mouth Daily.  3   • zolpidem (AMBIEN) 5 MG tablet zolpidem 5 mg tablet       No current facility-administered medications on file prior to visit.      Allergies   Allergen Reactions   • Sulfa Antibiotics Anaphylaxis   • Sulfamethoxazole-Trimethoprim Anaphylaxis   • Statins Myalgia   • Ciprofloxacin Rash   • Clarithromycin Rash   • Gabapentin Rash   • Nitrofurantoin Nausea Only   • Penicillins Rash   • Risperidone Rash   • Zyprexa [Olanzapine] Other (See Comments)     Extreme agitation     Past Surgical History:   Procedure Laterality Date   • ABDOMINAL SURGERY     • APPENDECTOMY     • CHOLECYSTECTOMY     • COLONOSCOPY      08/06/2016 DR. RAY   • DILATION AND CURETTAGE, DIAGNOSTIC / THERAPEUTIC     • ELECTROCONVULSIVE THERAPY Bilateral 10/6/2016    Procedure: BIFRONTAL ELECTROCONVULSIVE THERAPY;  Surgeon: Omi Foss MD;  Location: [facility] OR;  Service:    • ELECTROCONVULSIVE THERAPY Bilateral 10/11/2016    Procedure: BIFRONTAL ELECTROCONVULSIVE THERAPY;  Surgeon: Omi Foss MD;  Location: [facility] OR;  Service:    • ELECTROCONVULSIVE THERAPY  Bilateral 10/17/2016    Procedure: BIFRONTAL ELECTROCONVULSIVE THERAPY;  Surgeon: Omi Foss MD;  Location:  [facility] OR;  Service:    • ELECTROCONVULSIVE THERAPY Bilateral 10/28/2016    Procedure: ELECTROCONVULSIVE THERAPY;  Surgeon: Omi Foss MD;  Location:  [facility] OR;  Service:    • ELECTROCONVULSIVE THERAPY Bilateral 11/1/2016    Procedure: ELECTROCONVULSIVE THERAPY;  Surgeon: Omi Foss MD;  Location:  [facility] OR;  Service:    • ELECTROCONVULSIVE THERAPY Bilateral 11/3/2016    Procedure: ELECTROCONVULSIVE THERAPY;  Surgeon: Omi Foss MD;  Location:  [facility] OR;  Service:    • FRACTURE SURGERY     • HIP SURGERY      LEFT HIP REVISION DONOR BONE 06/03/2014   • JOINT REPLACEMENT     • TONSILLECTOMY     • TOTAL HIP ARTHROPLASTY Bilateral     LEFT-2010   RIGHT-2003   • TOTAL HIP ARTHROPLASTY  2004,2011,2015   • WRIST SURGERY       Family History   Problem Relation Age of Onset   • Arthritis Mother    • Ovarian cancer Mother    • Stomach cancer Mother    • Migraines Mother    • Mental illness Mother    • Depression Mother    • Cancer Mother    • Arthritis Father    • Seizures Father         EPILEPSY   • Hypertension Father    • Osteoporosis Father    • Mental illness Father    • Hyperlipidemia Father    • Stroke Father    • Depression Father    • Arthritis Sister    • Cancer Sister    • Migraines Sister    • Breast cancer Sister    • Cancer Other    • Diabetes Other    • Arthritis Other    • ADD / ADHD Neg Hx    • Alcohol abuse Neg Hx    • Anxiety disorder Neg Hx    • Bipolar disorder Neg Hx    • Dementia Neg Hx    • Drug abuse Neg Hx    • Paranoid behavior Neg Hx    • Schizophrenia Neg Hx    • Suicide Attempts Neg Hx      Social History     Socioeconomic History   • Marital status:      Spouse name: Not on file   • Number of children: Not on file   • Years of education: Not on file   • Highest education level: Not on file   Tobacco Use   • Smoking status:  "Never Smoker   • Smokeless tobacco: Never Used   Substance and Sexual Activity   • Alcohol use: No   • Drug use: No   • Sexual activity: Yes     Partners: Male     Birth control/protection: Post-menopausal     Review of Systems  All systems were reviewed and negative except for:  Genitourinary: postivie for  vaginal dryness     Objective  Vitals:    02/21/20 1355   BP: 120/60   Weight: 67.6 kg (149 lb)   Height: 175.3 cm (69\")     Physical Exam:  General Appearance: alert, appears stated age and cooperative  Head: normocephalic, without obvious abnormality and atraumatic  Eyes: lids and lashes normal, conjunctivae and sclerae normal, no icterus, no pallor, corneas clear and PERRLA  Ears: ears appear intact with no abnormalities noted  Nose: nares normal, septum midline, mucosa normal and no drainage  Neck: suppple, trachea midline and no thyromegaly  Lungs: clear to auscultation, respirations regular, respirations even and respirations unlabored  Heart: regular rhythm and normal rate, normal S1, S2, no murmur, gallop, or rubs and no click  Breasts: Not performed.  Abdomen: normal bowel sounds, no masses, no hepatomegaly, no splenomegaly, soft non-tender, no guarding and no rebound tenderness  Pelvic: Clinical staff was present for exam  External genitalia:  normal appearance of the external genitalia including Bartholin's and Cedar's glands.  :  urethral meatus normal;  Vaginal:  atrophic mucosal changes are present;  Cervix:  normal appearance.  Uterus:  normal size, shape and consistency.  Adnexa:  non palpable bilaterally.  Extremities: moves extremities well, no edema, no cyanosis and no redness  Skin: no bleeding, bruising or rash and no lesions noted  Lymph Nodes: no palpable adenopathy  Neuro: CN II-X grossly intact; sensation intact  Psych: normal mood and affect, oriented to person, time and place, thought content organized and appropriate judgment  Lab Review   No data reviewed    Imaging   No data " reviewed    Decision to Obtain Medical Records  No    Summary of Medical Records  No    Assessment/Plan  Problem List Items Addressed This Visit     None      Visit Diagnoses     Postmenopausal atrophic vaginitis    -  Primary Worsening  Patient with worsening symptoms.  Patient desires to go back on Estring.  A coupon card is given for Estring.  Prescription is given as noted.  I have counseled patient regarding the risk versus benefits of vaginal estrogen.  Patient understands she is to call if any vaginal bleeding.  The risk versus benefits have been discussed at length.  Other alternatives have been discussed as well.    Relevant Medications    Estring    Dyspareunia in female    Worsening  Patient with worsening of her dyspareunia.  Prescription for Estring is given.  Patient is counseled regarding the use of water-based lubricants with intercourse as well.  Instructions and precautions are given.  Is to follow-up as noted.    Relevant Medications    Estring    Frequent UTI    New  Patient now with frequent urinary tract infections as well.  I discussed with the patient the various treatment options.  Patient desires to resume Estring.  I also discussed with the patient other options for management of her symptoms but patient declines.  Prescriptions given as noted.  Instructions precautions are given.  Patient is to follow-up as noted.    Relevant Medications    Estring        Follow up as discussed/scheduled  Note: Speech recognition transcription software may have been used to dictate portions of this document.  An attempt at proofreading has been made though minor errors in transcription may still be present.  This note was electronically signed.  Amanda Avila M.D.

## 2020-02-24 ENCOUNTER — TELEPHONE (OUTPATIENT)
Dept: OBSTETRICS AND GYNECOLOGY | Facility: CLINIC | Age: 65
End: 2020-02-24

## 2020-02-24 NOTE — TELEPHONE ENCOUNTER
----- Message from Amanda Avila MD sent at 2/22/2020  1:09 PM EST -----  Please inform pt the correct rx for the brand Estring has been sent in.  Thank you.

## 2020-02-25 ENCOUNTER — TELEPHONE (OUTPATIENT)
Dept: OBSTETRICS AND GYNECOLOGY | Facility: CLINIC | Age: 65
End: 2020-02-25

## 2020-02-25 NOTE — TELEPHONE ENCOUNTER
----- Message from Leela Hartmann sent at 2/25/2020 11:10 AM EST -----  Contact: PT  THIS IS DR BOBBY'S PT.  SHE CALLED STATING HER INSURANCE WON'T COVER THE ESTRING OR THE GENERIC.  SHE TRIED TO USE TO COPAY CARD AND TRIED THROUGH [facility] [employer] AND BOTH WERE $450.  SHE CANNOT USE CREAMS BECAUSE OF TEARING AND UTI'S.  ARE WE ABLE TO GET PA FOR ESTRING OR GENERIC?  THANKS

## 2020-07-23 ENCOUNTER — OFFICE VISIT (OUTPATIENT)
Dept: INTERNAL MEDICINE | Facility: CLINIC | Age: 65
End: 2020-07-23

## 2020-07-23 VITALS
BODY MASS INDEX: 22.99 KG/M2 | SYSTOLIC BLOOD PRESSURE: 126 MMHG | DIASTOLIC BLOOD PRESSURE: 68 MMHG | TEMPERATURE: 97.1 F | WEIGHT: 155.2 LBS | RESPIRATION RATE: 16 BRPM | HEART RATE: 75 BPM | HEIGHT: 69 IN | OXYGEN SATURATION: 99 %

## 2020-07-23 DIAGNOSIS — R60.0 BILATERAL LEG EDEMA: ICD-10-CM

## 2020-07-23 DIAGNOSIS — R63.5 WEIGHT GAIN: ICD-10-CM

## 2020-07-23 DIAGNOSIS — E78.2 MULTIPLE-TYPE HYPERLIPIDEMIA: ICD-10-CM

## 2020-07-23 DIAGNOSIS — M25.50 POLYARTHRALGIA: ICD-10-CM

## 2020-07-23 DIAGNOSIS — Z76.89 ENCOUNTER TO ESTABLISH CARE: Primary | ICD-10-CM

## 2020-07-23 DIAGNOSIS — E55.9 VITAMIN D DEFICIENCY: ICD-10-CM

## 2020-07-23 DIAGNOSIS — Z00.00 PREVENTATIVE HEALTH CARE: ICD-10-CM

## 2020-07-23 DIAGNOSIS — M79.89 SWELLING OF BOTH HANDS: ICD-10-CM

## 2020-07-23 DIAGNOSIS — I73.00 RAYNAUD'S PHENOMENON WITHOUT GANGRENE: ICD-10-CM

## 2020-07-23 DIAGNOSIS — E53.8 COBALAMIN DEFICIENCY: ICD-10-CM

## 2020-07-23 DIAGNOSIS — E01.0 THYROMEGALY: ICD-10-CM

## 2020-07-23 DIAGNOSIS — Z11.59 NEED FOR HEPATITIS C SCREENING TEST: ICD-10-CM

## 2020-07-23 DIAGNOSIS — K21.00 GASTROESOPHAGEAL REFLUX DISEASE WITH ESOPHAGITIS: ICD-10-CM

## 2020-07-23 DIAGNOSIS — K44.9 HIATAL HERNIA: ICD-10-CM

## 2020-07-23 PROCEDURE — 99204 OFFICE O/P NEW MOD 45 MIN: CPT | Performed by: PHYSICIAN ASSISTANT

## 2020-07-23 RX ORDER — OMEPRAZOLE 40 MG/1
40 CAPSULE, DELAYED RELEASE ORAL DAILY
Qty: 90 CAPSULE | Refills: 1 | Status: SHIPPED | OUTPATIENT
Start: 2020-07-23 | End: 2020-08-14 | Stop reason: SDUPTHER

## 2020-07-23 RX ORDER — TRIAMTERENE AND HYDROCHLOROTHIAZIDE 37.5; 25 MG/1; MG/1
0.5 TABLET ORAL DAILY
Qty: 45 TABLET | Refills: 3 | Status: SHIPPED | OUTPATIENT
Start: 2020-07-23 | End: 2021-06-24 | Stop reason: SDUPTHER

## 2020-07-25 LAB
25(OH)D3+25(OH)D2 SERPL-MCNC: 43.9 NG/ML (ref 30–100)
ALBUMIN SERPL-MCNC: 4.6 G/DL (ref 3.5–5.2)
ALBUMIN/GLOB SERPL: 1.9 G/DL
ALP SERPL-CCNC: 71 U/L (ref 39–117)
ALT SERPL-CCNC: 13 U/L (ref 1–33)
AST SERPL-CCNC: 15 U/L (ref 1–32)
BILIRUB SERPL-MCNC: 0.4 MG/DL (ref 0–1.2)
BUN SERPL-MCNC: 13 MG/DL (ref 8–23)
BUN/CREAT SERPL: 18.6 (ref 7–25)
CALCIUM SERPL-MCNC: 9.6 MG/DL (ref 8.6–10.5)
CHLORIDE SERPL-SCNC: 102 MMOL/L (ref 98–107)
CHOLEST SERPL-MCNC: 258 MG/DL (ref 0–200)
CO2 SERPL-SCNC: 26.6 MMOL/L (ref 22–29)
CREAT SERPL-MCNC: 0.7 MG/DL (ref 0.57–1)
GLOBULIN SER CALC-MCNC: 2.4 GM/DL
GLUCOSE SERPL-MCNC: 91 MG/DL (ref 65–99)
HCV AB S/CO SERPL IA: <0.1 S/CO RATIO (ref 0–0.9)
HDLC SERPL-MCNC: 86 MG/DL (ref 40–60)
LDLC SERPL CALC-MCNC: 160 MG/DL (ref 0–100)
POTASSIUM SERPL-SCNC: 3.9 MMOL/L (ref 3.5–5.2)
PROT SERPL-MCNC: 7 G/DL (ref 6–8.5)
SODIUM SERPL-SCNC: 140 MMOL/L (ref 136–145)
T4 FREE SERPL-MCNC: 1.15 NG/DL (ref 0.93–1.7)
TRIGL SERPL-MCNC: 60 MG/DL (ref 0–150)
TSH SERPL DL<=0.005 MIU/L-ACNC: 1.65 UIU/ML (ref 0.27–4.2)
VIT B12 SERPL-MCNC: 653 PG/ML (ref 211–946)
VLDLC SERPL CALC-MCNC: 12 MG/DL

## 2020-07-27 NOTE — PROGRESS NOTES
B12, vitamin D and thyroid function were all within good ranges. Cholesterol is elevated compared to 3 years ago so she should attempt to reduce intake of animal fats to improve it.

## 2020-07-28 ENCOUNTER — TELEPHONE (OUTPATIENT)
Dept: INTERNAL MEDICINE | Facility: CLINIC | Age: 65
End: 2020-07-28

## 2020-07-28 DIAGNOSIS — H92.03 ACUTE EAR PAIN, BILATERAL: Primary | ICD-10-CM

## 2020-07-28 NOTE — TELEPHONE ENCOUNTER
Contacted patient with lab results and she states that she has discussed her cholesterol levels with provider and has found it hard to lower levels since it is hereditary. Patient states she is a very clean eater and her diet consists mainly of chicken. Is there any suggestions you have to help lower her levels??       Patient states she needs a referral to rheumatology (Dr. Valderrama with [facility]?) and she is asking if she needs a referral to ENT due to the fluid in her ears.       Please advise and I will contact patient or she states you can respond through My Chart

## 2020-07-28 NOTE — TELEPHONE ENCOUNTER
No need for a referral to ENT, the fluid should resolve without intervention.   Cholesterol can sometimes be helped with use of psyllium and fish oil supplements.   I will refer her to Dr. Valderrama for rheumatology.

## 2020-07-30 ENCOUNTER — HOSPITAL ENCOUNTER (OUTPATIENT)
Dept: ULTRASOUND IMAGING | Facility: HOSPITAL | Age: 65
Discharge: HOME | End: 2020-07-30
Admitting: PHYSICIAN ASSISTANT

## 2020-07-30 PROCEDURE — 76536 US EXAM OF HEAD AND NECK: CPT

## 2020-08-14 ENCOUNTER — OFFICE VISIT (OUTPATIENT)
Dept: GASTROENTEROLOGY | Facility: CLINIC | Age: 65
End: 2020-08-14

## 2020-08-14 VITALS
TEMPERATURE: 98.2 F | SYSTOLIC BLOOD PRESSURE: 145 MMHG | WEIGHT: 157 LBS | DIASTOLIC BLOOD PRESSURE: 86 MMHG | HEIGHT: 69 IN | BODY MASS INDEX: 23.25 KG/M2 | HEART RATE: 67 BPM

## 2020-08-14 DIAGNOSIS — K62.1 POLYP OF RECTUM: ICD-10-CM

## 2020-08-14 DIAGNOSIS — K21.00 GASTROESOPHAGEAL REFLUX DISEASE WITH ESOPHAGITIS: Primary | ICD-10-CM

## 2020-08-14 DIAGNOSIS — R13.19 ESOPHAGEAL DYSPHAGIA: ICD-10-CM

## 2020-08-14 PROCEDURE — 99214 OFFICE O/P EST MOD 30 MIN: CPT | Performed by: NURSE PRACTITIONER

## 2020-08-14 RX ORDER — OMEPRAZOLE 40 MG/1
40 CAPSULE, DELAYED RELEASE ORAL 2 TIMES DAILY
Qty: 60 CAPSULE | Refills: 2 | Status: SHIPPED | OUTPATIENT
Start: 2020-08-14 | End: 2021-01-28 | Stop reason: SDUPTHER

## 2020-08-14 NOTE — PROGRESS NOTES
GASTROENTEROLOGY OFFICE NOTE  Leticia Arreguin  [id number]  1955    CARE TEAM  Patient Care Team:  Brigitte Contreras MD as PCP - General (Family Medicine)    Referring Provider: Brigitte Contreras MD    Chief Complaint   Patient presents with   • Heartburn        HISTORY OF PRESENT ILLNESS:  Ms. Arreguin is seen today with complaints of heartburn.  She reports she has been on omeprazole 20 mg over-the-counter for many years with seemingly good control of reflux symptoms but over the past year she has had increased frequency of heartburn, having it every day.  She has a problem when she bends forward feeling acid all the way up to the back of her throat.  She also complains of associated intermittent dysphagia to solids.  She increase the dose of omeprazole from 20 mg daily to 40 mg daily about a month ago and has noticed some improvement with the frequency of heartburn however continues to have acid feeling over when she leans forward.    Patient's last colonoscopy was in August 2015 by Dr. Lisa Dudley.  A polyp was removed from the rectum with recommendations to repeat colonoscopy again in 5 years.    PAST MEDICAL HISTORY  Past Medical History:   Diagnosis Date   • Anxiety    • Asthma     AS CHILD   • Colonic polyp    • Depression    • Diverticulosis    • Dyslipidemia    • Electrocardiogram finding, abnormal, without diagnosis    • Elevated LFTs    • Esophageal reflux    • Fall    • Female pelvic pain    • Fibromyalgia    • Fracture    • GERD (gastroesophageal reflux disease)    • Gestational diabetes    • H/O mammogram    • Headache    • History of blood transfusion    • History of Holter monitoring     Findings were normal..    • Hypertension    • Insomnia    • Joint pain    • Migraine    • Osteoarthritis    • Ovarian cyst    • Pain in joint of left hip    • PTSD (post-traumatic stress disorder)    • Recurrent UTI (urinary tract infection)    • Rheumatic fever    • Shoulder sprain    • Swelling of ankle joint    •  Tinnitus    • Vaginitis    • Vitamin B12 deficiency    • Vitamin D deficiency disease         PAST SURGICAL HISTORY  Past Surgical History:   Procedure Laterality Date   • ABDOMINAL SURGERY     • APPENDECTOMY     • CHOLECYSTECTOMY     • COLONOSCOPY      08/06/2016 DR. RAY   • DILATION AND CURETTAGE, DIAGNOSTIC / THERAPEUTIC     • ELECTROCONVULSIVE THERAPY Bilateral 10/6/2016    Procedure: BIFRONTAL ELECTROCONVULSIVE THERAPY;  Surgeon: Omi Foss MD;  Location: [facility] OR;  Service:    • ELECTROCONVULSIVE THERAPY Bilateral 10/11/2016    Procedure: BIFRONTAL ELECTROCONVULSIVE THERAPY;  Surgeon: Omi Foss MD;  Location:  [facility] OR;  Service:    • ELECTROCONVULSIVE THERAPY Bilateral 10/17/2016    Procedure: BIFRONTAL ELECTROCONVULSIVE THERAPY;  Surgeon: Omi Foss MD;  Location:  [facility] OR;  Service:    • ELECTROCONVULSIVE THERAPY Bilateral 10/28/2016    Procedure: ELECTROCONVULSIVE THERAPY;  Surgeon: Omi Foss MD;  Location:  [facility] OR;  Service:    • ELECTROCONVULSIVE THERAPY Bilateral 11/1/2016    Procedure: ELECTROCONVULSIVE THERAPY;  Surgeon: Omi Foss MD;  Location: [facility] OR;  Service:    • ELECTROCONVULSIVE THERAPY Bilateral 11/3/2016    Procedure: ELECTROCONVULSIVE THERAPY;  Surgeon: Omi Foss MD;  Location: [facility] OR;  Service:    • FRACTURE SURGERY     • HIP SURGERY      LEFT HIP REVISION DONOR BONE 06/03/2014   • JOINT REPLACEMENT     • TONSILLECTOMY     • TOTAL HIP ARTHROPLASTY Bilateral     LEFT-2010   RIGHT-2003   • TOTAL HIP ARTHROPLASTY  2004,2011,2015   • WRIST SURGERY          MEDICATIONS:    Current Outpatient Medications:   •  estradiol (ESTRING) 2 MG vaginal ring, Insert one ring into the vagina every three months., Disp: 1 each, Rfl: 4  •  Glucosamine-Chondroitin (OSTEO BI-FLEX REGULAR STRENGTH PO), Take  by mouth., Disp: , Rfl:   •  Melatonin-Pyridoxine (MELATIN PO), Take  by mouth Every Night., Disp: , Rfl:    •  Multiple Vitamins-Minerals (MULTIVITAMIN ADULT EXTRA C PO), multivitamin  1 po QDay, Disp: , Rfl:   •  omeprazole (priLOSEC) 40 MG capsule, Take 1 capsule by mouth 2 (two) times a day., Disp: 60 capsule, Rfl: 2  •  Probiotic Product (PROBIOTIC-10) capsule, Take  by mouth., Disp: , Rfl:   •  triamterene-hydrochlorothiazide (MAXZIDE-25) 37.5-25 MG per tablet, Take 0.5 tablets by mouth Daily., Disp: 45 tablet, Rfl: 3    ALLERGIES  Allergies   Allergen Reactions   • Sulfa Antibiotics Anaphylaxis   • Sulfamethoxazole-Trimethoprim Anaphylaxis   • Statins Myalgia   • Ciprofloxacin Rash   • Clarithromycin Rash   • Gabapentin Rash   • Nitrofurantoin Nausea Only   • Penicillins Rash   • Risperidone Rash   • Zyprexa [Olanzapine] Irritability     Extreme agitation       FAMILY HISTORY:  Family History   Problem Relation Age of Onset   • Arthritis Mother    • Ovarian cancer Mother    • Stomach cancer Mother    • Migraines Mother    • Mental illness Mother    • Depression Mother    • Cancer Mother    • Arthritis Father    • Seizures Father         EPILEPSY   • Hypertension Father    • Osteoporosis Father    • Mental illness Father    • Hyperlipidemia Father    • Stroke Father    • Depression Father    • Lupus Father    • Cancer Sister    • Migraines Sister    • Breast cancer Sister    • Rheum arthritis Sister    • Cancer Other    • Diabetes Other    • Arthritis Other    • ADD / ADHD Neg Hx    • Alcohol abuse Neg Hx    • Anxiety disorder Neg Hx    • Bipolar disorder Neg Hx    • Dementia Neg Hx    • Drug abuse Neg Hx    • Paranoid behavior Neg Hx    • Schizophrenia Neg Hx    • Suicide Attempts Neg Hx        SOCIAL HISTORY  Social History     Socioeconomic History   • Marital status:      Spouse name: Not on file   • Number of children: Not on file   • Years of education: Not on file   • Highest education level: Not on file   Tobacco Use   • Smoking status: Never Smoker   • Smokeless tobacco: Never Used   Substance and  "Sexual Activity   • Alcohol use: No   • Drug use: No   • Sexual activity: Yes     Partners: Male     Birth control/protection: Post-menopausal       REVIEW OF SYSTEMS  Review of Systems   Constitutional: Negative for activity change, appetite change, chills, diaphoresis, fatigue, fever, unexpected weight gain and unexpected weight loss.   HENT: Positive for trouble swallowing. Negative for voice change.    Gastrointestinal: Positive for GERD. Negative for abdominal distention, abdominal pain, anal bleeding, blood in stool, constipation, diarrhea, nausea, rectal pain, vomiting and indigestion.         PHYSICAL EXAM   /86 (BP Location: Right arm, Patient Position: Sitting, Cuff Size: Adult)   Pulse 67   Temp 98.2 °F (36.8 °C) (Temporal)   Ht 175.3 cm (69.02\")   Wt 71.2 kg (157 lb)   BMI 23.17 kg/m²   Physical Exam   Constitutional: She is oriented to person, place, and time. She appears well-developed and well-nourished.   HENT:   Head: Normocephalic.   Mouth/Throat: Oropharynx is clear and moist.   Eyes: Pupils are equal, round, and reactive to light. EOM are normal.   Neck: Normal range of motion. Neck supple.   Cardiovascular: Normal rate and regular rhythm.   Pulmonary/Chest: Effort normal and breath sounds normal. She has no wheezes. She has no rales.   Abdominal: Soft. Bowel sounds are normal. She exhibits no mass. There is no tenderness. There is no rebound and no guarding. No hernia.   Musculoskeletal: Normal range of motion.   Neurological: She is alert and oriented to person, place, and time. No cranial nerve deficit.   Skin: Skin is warm and dry.   Psychiatric: She has a normal mood and affect. Her behavior is normal. Judgment normal.   Nursing note and vitals reviewed.    Results Review:  [facility] records reviewed and discussed with patient.     ASSESSMENT / PLAN  1.  GERD  -Increase omeprazole to 40 mg twice daily x8 weeks  2.  Dysphasia  - EGD recommended, patient declined  3.  Screen for " colon cancer  - Colonoscopy for colorectal cancer screening, patient declined    Return in about 6 weeks (around 9/25/2020).    I discussed the patients findings and my recommendations with patient    PIERRE Braun

## 2020-09-18 ENCOUNTER — OFFICE VISIT (OUTPATIENT)
Dept: INTERNAL MEDICINE | Facility: CLINIC | Age: 65
End: 2020-09-18

## 2020-09-18 VITALS
TEMPERATURE: 97.6 F | SYSTOLIC BLOOD PRESSURE: 134 MMHG | DIASTOLIC BLOOD PRESSURE: 84 MMHG | BODY MASS INDEX: 23.55 KG/M2 | OXYGEN SATURATION: 98 % | WEIGHT: 159 LBS | HEIGHT: 69 IN | HEART RATE: 68 BPM

## 2020-09-18 DIAGNOSIS — R25.2 LEG CRAMPS: Primary | ICD-10-CM

## 2020-09-18 DIAGNOSIS — R60.0 BILATERAL LEG EDEMA: ICD-10-CM

## 2020-09-18 DIAGNOSIS — G47.00 INSOMNIA, UNSPECIFIED TYPE: ICD-10-CM

## 2020-09-18 DIAGNOSIS — N95.1 MENOPAUSAL SYMPTOMS: ICD-10-CM

## 2020-09-18 DIAGNOSIS — R63.5 WEIGHT GAIN: ICD-10-CM

## 2020-09-18 PROCEDURE — 90686 IIV4 VACC NO PRSV 0.5 ML IM: CPT | Performed by: PHYSICIAN ASSISTANT

## 2020-09-18 PROCEDURE — 90471 IMMUNIZATION ADMIN: CPT | Performed by: PHYSICIAN ASSISTANT

## 2020-09-18 PROCEDURE — 99214 OFFICE O/P EST MOD 30 MIN: CPT | Performed by: PHYSICIAN ASSISTANT

## 2020-09-18 RX ORDER — DIPHENHYDRAMINE HCL 25 MG
25 TABLET ORAL EVERY 6 HOURS PRN
COMMUNITY
End: 2021-01-28

## 2020-09-18 NOTE — PROGRESS NOTES
Leticia Arreguin is a 64 y.o. female.     Subjective   History of Present Illness   Here today with concern of acute on chronic bilateral nocturnal leg cramps.  Cramps are somewhat bothersome during the day but most bothersome at night.  She began Maxide-25 0.5 tablet daily around 2 months ago, but she does not feel that this has contributed to symptoms as she has previously taken it and tolerated it well without similar side effect.  Swelling in the legs has improved some since starting Maxide.  She denies any low back pain.  She was prescribed Doxycycline and steroid from ENT last month which seeme but around 2 hours per night.  She is tried many sleep medications in the past which have all been failed for reasons such as side effects or inefficacy. She is also still bothered by weight gain despite previous labs unrevealing for a cause.  She is very active and reports that she barely eats so is unsure why she continues to gain weight.  She also has had hot flashes causing her to be drenched in sweat for many years which have seemed worse over the last year.  She is prescribed Estring from gynecology for control of vaginal menopause symptoms.  She has an intact uterus.    She did establish with gastroenterology after our last visit and reports that she has not as much acid reflux since they increased omeprazole to 40 mg twice daily.         The following portions of the patient's history were reviewed and updated as appropriate: allergies, current medications, past family history, past medical history, past social history, past surgical history and problem list.    Review of Systems   Constitutional: Positive for unexpected weight gain. Negative for activity change, chills, fatigue, fever and unexpected weight loss.   HENT: Negative.    Eyes: Negative.  Negative for visual disturbance.   Respiratory: Negative for cough, chest tightness, shortness of breath and wheezing.    Cardiovascular: Positive for leg  swelling. Negative for chest pain and palpitations.   Gastrointestinal: Negative for abdominal pain, constipation, diarrhea, nausea and vomiting.   Endocrine: Negative for polydipsia, polyphagia and polyuria.        Hot flashes   Genitourinary: Negative.    Musculoskeletal: Positive for arthralgias and myalgias. Negative for joint swelling, neck stiffness and bursitis.   Skin: Negative.    Allergic/Immunologic: Negative for immunocompromised state.   Neurological: Negative for dizziness, speech difficulty, weakness, numbness, headache and confusion.   Hematological: Negative for adenopathy. Does not bruise/bleed easily.   Psychiatric/Behavioral: Positive for sleep disturbance. Negative for agitation, hallucinations and depressed mood. The patient is nervous/anxious.          Objective    Physical Exam  Vitals signs and nursing note reviewed.   Constitutional:       General: She is not in acute distress.     Appearance: Normal appearance. She is well-developed and normal weight. She is not ill-appearing, toxic-appearing or diaphoretic.   HENT:      Head: Normocephalic and atraumatic.      Right Ear: Tympanic membrane, ear canal and external ear normal. There is no impacted cerumen.      Left Ear: Tympanic membrane, ear canal and external ear normal. There is no impacted cerumen.   Eyes:      General: No scleral icterus.     Conjunctiva/sclera: Conjunctivae normal.      Pupils: Pupils are equal, round, and reactive to light.   Neck:      Musculoskeletal: Normal range of motion and neck supple. No neck rigidity or muscular tenderness.   Cardiovascular:      Rate and Rhythm: Normal rate and regular rhythm.      Heart sounds: Normal heart sounds. No murmur. No friction rub. No gallop.    Pulmonary:      Effort: Pulmonary effort is normal. No respiratory distress.      Breath sounds: Normal breath sounds. No wheezing, rhonchi or rales.   Chest:      Chest wall: No tenderness.   Abdominal:      General: Bowel sounds are  "normal. There is no distension.      Palpations: Abdomen is soft.      Tenderness: There is generalized abdominal tenderness. There is no right CVA tenderness, left CVA tenderness, guarding or rebound. Negative signs include Saenz's sign, Rovsing's sign, McBurney's sign, psoas sign and obturator sign.   Musculoskeletal: Normal range of motion.         General: No deformity.      Lumbar back: She exhibits tenderness and bony tenderness. She exhibits normal range of motion, no swelling, no deformity, no spasm and normal pulse.      Right upper leg: She exhibits no tenderness, no bony tenderness, no edema, no deformity and no laceration.      Left upper leg: She exhibits no tenderness, no bony tenderness, no edema, no deformity and no laceration.      Right lower leg: She exhibits no tenderness, no bony tenderness, no swelling, no deformity and no laceration. 1+ Edema present.      Left lower leg: She exhibits no tenderness, no bony tenderness, no swelling and no deformity. 1+ Edema present.   Lymphadenopathy:      Cervical: No cervical adenopathy.   Skin:     General: Skin is warm and dry.      Capillary Refill: Capillary refill takes less than 2 seconds.      Findings: No rash.   Neurological:      Mental Status: She is alert and oriented to person, place, and time.      Cranial Nerves: No cranial nerve deficit.      Sensory: No sensory deficit.      Motor: No abnormal muscle tone.      Coordination: Coordination normal.      Deep Tendon Reflexes: Reflexes normal.   Psychiatric:         Attention and Perception: Attention and perception normal.         Mood and Affect: Mood normal.         Behavior: Behavior normal.         Thought Content: Thought content normal.         Cognition and Memory: Cognition and memory normal.         Judgment: Judgment normal.      Comments: Somewhat rapid speech, hard to keep up with.            /84   Pulse 68   Temp 97.6 °F (36.4 °C)   Ht 175.3 cm (69.02\")   Wt 72.1 kg (159 " lb)   SpO2 98%   BMI 23.47 kg/m²     Nursing note and vitals reviewed.          Assessment/Plan   Leticia was seen today for leg cramps.    Diagnoses and all orders for this visit:    Leg cramps  -     Renal Function Panel  Recommended she try chiropractic care as it is possible that the cause of her bilateral leg pain is stemming from lumbar region.    Bilateral leg edema  -     TSH  -     T4, Free  -     T3, Free    Weight gain  -     TSH  -     T4, Free  -     T3, Free    Menopausal symptoms  -     17-Hydroxyprogesterone  -     Begin: Progesterone (PROMETRIUM) 200 MG capsule; Take 1 capsule by mouth every day for 10 days per month. Repeat at the same time each month.  Continue Estrace.    Insomnia, unspecified type  -     TSH  -     T4, Free  -     T3, Free    Other orders  -     Fluarix Quad >6 Months (1690-1260)      Recheck thyroid labs after abstaining from all supplements which may contain biotin for at least 1 week.                 SEBASTIAN Hobbs  09/18/2020  17:41 EDT

## 2020-09-28 ENCOUNTER — TELEPHONE (OUTPATIENT)
Dept: INTERNAL MEDICINE | Facility: CLINIC | Age: 65
End: 2020-09-28

## 2020-09-28 NOTE — TELEPHONE ENCOUNTER
Pt has yeast infection, can she get rx for terazole, this usually helps, she just recently had an antibiotic

## 2020-10-02 LAB
17OHP SERPL-MCNC: 27 NG/DL
ALBUMIN SERPL-MCNC: 4.6 G/DL (ref 3.5–5.2)
BUN SERPL-MCNC: 11 MG/DL (ref 8–23)
BUN/CREAT SERPL: 11.2 (ref 7–25)
CALCIUM SERPL-MCNC: 9.2 MG/DL (ref 8.6–10.5)
CHLORIDE SERPL-SCNC: 101 MMOL/L (ref 98–107)
CO2 SERPL-SCNC: 26.7 MMOL/L (ref 22–29)
CREAT SERPL-MCNC: 0.98 MG/DL (ref 0.57–1)
GLUCOSE SERPL-MCNC: 83 MG/DL (ref 65–99)
PHOSPHATE SERPL-MCNC: 3.9 MG/DL (ref 2.5–4.5)
POTASSIUM SERPL-SCNC: 4.1 MMOL/L (ref 3.5–5.2)
SODIUM SERPL-SCNC: 138 MMOL/L (ref 136–145)
T3FREE SERPL-MCNC: 2.7 PG/ML (ref 2–4.4)
T4 FREE SERPL-MCNC: 1.2 NG/DL (ref 0.93–1.7)
TSH SERPL DL<=0.005 MIU/L-ACNC: 1.81 UIU/ML (ref 0.27–4.2)

## 2020-10-28 ENCOUNTER — TRANSCRIBE ORDERS (OUTPATIENT)
Dept: ADMINISTRATIVE | Facility: HOSPITAL | Age: 65
End: 2020-10-28

## 2020-10-28 DIAGNOSIS — Z12.31 VISIT FOR SCREENING MAMMOGRAM: Primary | ICD-10-CM

## 2020-12-21 ENCOUNTER — HOSPITAL ENCOUNTER (OUTPATIENT)
Dept: MAMMOGRAPHY | Facility: HOSPITAL | Age: 65
Discharge: HOME | End: 2020-12-21
Admitting: PHYSICIAN ASSISTANT

## 2020-12-21 DIAGNOSIS — Z12.31 VISIT FOR SCREENING MAMMOGRAM: ICD-10-CM

## 2020-12-21 PROCEDURE — 77063 BREAST TOMOSYNTHESIS BI: CPT

## 2020-12-21 PROCEDURE — 77067 SCR MAMMO BI INCL CAD: CPT

## 2021-01-28 ENCOUNTER — OFFICE VISIT (OUTPATIENT)
Dept: INTERNAL MEDICINE | Facility: CLINIC | Age: 66
End: 2021-01-28

## 2021-01-28 VITALS — BODY MASS INDEX: 23.17 KG/M2 | WEIGHT: 157 LBS

## 2021-01-28 DIAGNOSIS — F41.9 ANXIETY: ICD-10-CM

## 2021-01-28 DIAGNOSIS — Z00.00 PREVENTATIVE HEALTH CARE: ICD-10-CM

## 2021-01-28 DIAGNOSIS — K21.00 GASTROESOPHAGEAL REFLUX DISEASE WITH ESOPHAGITIS: Primary | ICD-10-CM

## 2021-01-28 DIAGNOSIS — K21.00 GASTROESOPHAGEAL REFLUX DISEASE WITH ESOPHAGITIS: ICD-10-CM

## 2021-01-28 DIAGNOSIS — E78.5 DYSLIPIDEMIA: ICD-10-CM

## 2021-01-28 DIAGNOSIS — F51.04 PSYCHOPHYSIOLOGICAL INSOMNIA: ICD-10-CM

## 2021-01-28 DIAGNOSIS — R13.10 DYSPHAGIA, UNSPECIFIED TYPE: ICD-10-CM

## 2021-01-28 DIAGNOSIS — H93.13 TINNITUS, BILATERAL: ICD-10-CM

## 2021-01-28 PROCEDURE — 99214 OFFICE O/P EST MOD 30 MIN: CPT | Performed by: PHYSICIAN ASSISTANT

## 2021-01-28 RX ORDER — OMEPRAZOLE 40 MG/1
40 CAPSULE, DELAYED RELEASE ORAL 2 TIMES DAILY
Qty: 60 CAPSULE | Refills: 2 | Status: SHIPPED | OUTPATIENT
Start: 2021-01-28 | End: 2021-04-21

## 2021-01-28 RX ORDER — CYCLOBENZAPRINE HCL 10 MG
10 TABLET ORAL 3 TIMES DAILY PRN
COMMUNITY
End: 2021-04-23

## 2021-01-28 RX ORDER — OMEPRAZOLE 40 MG/1
CAPSULE, DELAYED RELEASE ORAL
Qty: 90 CAPSULE | Refills: 1 | OUTPATIENT
Start: 2021-01-28

## 2021-01-28 NOTE — PROGRESS NOTES
You have chosen to receive care through a telehealth visit.  Do you consent to use a video/audio connection for your medical care today? Yes  Active parties: Linnette Marroquin PA-C, Prudence Kyle CMA, Leticia Arreguin    Chief Complaint  Follow-up (refills, swallowing trouble, anxiety)    Subjective          Leticia Arreguin presents to [facility] for anxiety and swallowing trouble.  History of Present Illness  Patient presents today via video visit with multiple concerns.    She has noticed long-term intermittent difficulty and pain with swallowing which has been worse for the last couple of weeks so she has been eating only once per day and sticking to soft foods.  She also feels as though she has noticed some change in her voice and last couple weeks as well.  She underwent thyroid ultrasound for the same concern in July 2020 which was unremarkable.  She also underwent a swallow study for the same complaint in July 2018 which was unremarkable.  She was sent to gastroenterology after our last appointment due to the same complaint at which time omeprazole was increased from 20 mg daily to 40 mg twice daily.  She reports that there may have been some improvement in acid reflux symptoms at night since the dose increase but the improvement is not significant and there has not been any change in swallowing difficulties.  She was not fond of the provider she saw so she will schedule an appointment with her old gastroenterology group for follow-up.      She feels as though stress and anxiety may contribute to difficulty swallowing as she has been increasingly stressed and anxious for the last several weeks.  Stress has caused increased difficulty with insomnia as well.  She has been attempting to manage symptoms by taking walks, staying active and doing a Bible study, however symptoms remain bothersome.  She was previously under the care of behavioral health at which time many medications were tried  either resulting in lack of efficacy or adverse side effects.  She is not interested in trying any medications at this time and she has also had bad experiences with counseling in the past which made her hesitant to try it again.    Today she also reports continued bothersome roaring and buzzing sound in her head which she describes as 3-4 different distinct sounds.  She was previously referred to ear nose and throat where she was found to have hearing loss at which time hearing aids were recommended.  She reports that years ago she also saw an ear nose and throat specialist who recommended hearing aids and let her try some which seemed to amplify the bothersome sounds so she did not purchase the hearing aids.  She is afraid that hearing aids would cause the same issue now and due to cost she is not willing to try hearing aids at this time.  She does have hyperlipidemia and we have not yet ruled out a vascular etiology of symptoms.       Objective   Vital Signs:   Wt 71.2 kg (157 lb)   BMI 23.17 kg/m²     Physical Exam  Vitals signs and nursing note reviewed.   Constitutional:       General: She is not in acute distress.     Appearance: She is well-developed. She is not ill-appearing, toxic-appearing or diaphoretic.   HENT:      Head: Normocephalic and atraumatic.      Right Ear: External ear normal.      Left Ear: External ear normal.   Eyes:      General: No scleral icterus.     Extraocular Movements: Extraocular movements intact.   Neck:      Musculoskeletal: Normal range of motion.      Comments: Neck appears normal  Pulmonary:      Effort: Pulmonary effort is normal. No respiratory distress.   Skin:     Coloration: Skin is not pale.      Findings: No erythema or rash.   Neurological:      Mental Status: She is alert and oriented to person, place, and time.      Coordination: Coordination normal.   Psychiatric:         Attention and Perception: Attention and perception normal.         Mood and Affect: Mood is  anxious. Mood is not depressed. Affect is not blunt, flat or inappropriate.         Speech: Speech normal.         Behavior: Behavior normal. Behavior is cooperative.         Thought Content: Thought content normal.         Cognition and Memory: Cognition and memory normal.         Judgment: Judgment normal.        Result Review :   The following data was reviewed by: SEBASTIAN Hobbs on 01/28/2021:  Lipid Panel    Lipid Panel 7/24/20   Total Cholesterol 258 (A)   Triglycerides 60   HDL Cholesterol 86 (A)   VLDL Cholesterol 12   LDL Cholesterol  160 (A)   (A) Abnormal value            Data reviewed: Radiologic studies Thyroid ultrasound (7/2020) and swallowing study (7/2018)          Assessment and Plan    Problem List Items Addressed This Visit        Cardiac and Vasculature    Dyslipidemia (Chronic)    Relevant Orders    Lipid Panel  Unable to tolerate statins due to statin induced myalgia.       Gastrointestinal Abdominal     Gastroesophageal reflux disease with esophagitis - Primary (Chronic)    Relevant Medications    Continue twice daily as directed by gastroenterology: Omeprazole (priLOSEC) 40 MG capsule    She was strongly encouraged to schedule an appointment with her previous gastroenterologist for further evaluation of swallowing difficulties.       Mental Health    Anxiety    Relevant Orders    TSH    T4, Free    T3, Free    Ambulatory Referral to Psychology - Counseling recommended which patient is willing to try.       Sleep    Insomnia    Relevant Orders    TSH    T4, Free    T3, Free    Ambulatory Referral to Psychology    Counseling recommended which patient is willing to try.      Other Visit Diagnoses     Tinnitus, bilateral        Relevant Orders    US carotid bilateral  We will attempt to rule out vascular origin of symptoms.      Dysphagia, unspecified type        Relevant Medications    Continue twice daily as directed by gastroenterology: Omeprazole (priLOSEC) 40 MG capsule    She was  strongly encouraged to schedule an appointment with her previous gastroenterologist for further evaluation of swallowing difficulties.      Other Relevant Orders    TSH    T4, Free    T3, Free    Preventative health care        Relevant Orders    Comprehensive Metabolic Panel    Lipid Panel    CBC (No Diff)        Follow Up   Return in about 6 months (around 7/28/2021) for welcome to medicare.  Patient was given instructions and counseling regarding her condition or for health maintenance advice. Please see specific information pulled into the AVS if appropriate.

## 2021-01-29 LAB
ALBUMIN SERPL-MCNC: 4.4 G/DL (ref 3.5–5.2)
ALBUMIN/GLOB SERPL: 1.8 G/DL
ALP SERPL-CCNC: 65 U/L (ref 39–117)
ALT SERPL-CCNC: 15 U/L (ref 1–33)
AST SERPL-CCNC: 20 U/L (ref 1–32)
BILIRUB SERPL-MCNC: 0.4 MG/DL (ref 0–1.2)
BUN SERPL-MCNC: 14 MG/DL (ref 8–23)
BUN/CREAT SERPL: 18.9 (ref 7–25)
CALCIUM SERPL-MCNC: 9.4 MG/DL (ref 8.6–10.5)
CHLORIDE SERPL-SCNC: 102 MMOL/L (ref 98–107)
CHOLEST SERPL-MCNC: 265 MG/DL (ref 0–200)
CO2 SERPL-SCNC: 25.8 MMOL/L (ref 22–29)
CREAT SERPL-MCNC: 0.74 MG/DL (ref 0.57–1)
ERYTHROCYTE [DISTWIDTH] IN BLOOD BY AUTOMATED COUNT: 12.5 % (ref 12.3–15.4)
GLOBULIN SER CALC-MCNC: 2.5 GM/DL
GLUCOSE SERPL-MCNC: 89 MG/DL (ref 65–99)
HCT VFR BLD AUTO: 40.2 % (ref 34–46.6)
HDLC SERPL-MCNC: 75 MG/DL (ref 40–60)
HGB BLD-MCNC: 13.8 G/DL (ref 12–15.9)
LDLC SERPL CALC-MCNC: 179 MG/DL (ref 0–100)
MCH RBC QN AUTO: 30.6 PG (ref 26.6–33)
MCHC RBC AUTO-ENTMCNC: 34.3 G/DL (ref 31.5–35.7)
MCV RBC AUTO: 89.1 FL (ref 79–97)
PLATELET # BLD AUTO: 337 10*3/MM3 (ref 140–450)
POTASSIUM SERPL-SCNC: 4 MMOL/L (ref 3.5–5.2)
PROT SERPL-MCNC: 6.9 G/DL (ref 6–8.5)
RBC # BLD AUTO: 4.51 10*6/MM3 (ref 3.77–5.28)
SODIUM SERPL-SCNC: 138 MMOL/L (ref 136–145)
T3FREE SERPL-MCNC: 2.9 PG/ML (ref 2–4.4)
T4 FREE SERPL-MCNC: 1.18 NG/DL (ref 0.93–1.7)
TRIGL SERPL-MCNC: 68 MG/DL (ref 0–150)
TSH SERPL DL<=0.005 MIU/L-ACNC: 1.33 UIU/ML (ref 0.27–4.2)
VLDLC SERPL CALC-MCNC: 11 MG/DL (ref 5–40)
WBC # BLD AUTO: 7.2 10*3/MM3 (ref 3.4–10.8)

## 2021-02-05 ENCOUNTER — TELEMEDICINE (OUTPATIENT)
Dept: INTERNAL MEDICINE | Facility: CLINIC | Age: 66
End: 2021-02-05

## 2021-02-05 VITALS — TEMPERATURE: 96.8 F

## 2021-02-05 DIAGNOSIS — J01.40 ACUTE NON-RECURRENT PANSINUSITIS: Primary | ICD-10-CM

## 2021-02-05 PROCEDURE — 99213 OFFICE O/P EST LOW 20 MIN: CPT | Performed by: PHYSICIAN ASSISTANT

## 2021-02-05 RX ORDER — CYCLOBENZAPRINE HCL 5 MG
5 TABLET ORAL
COMMUNITY
Start: 2021-01-27 | End: 2021-02-05

## 2021-02-05 RX ORDER — AMOXICILLIN AND CLAVULANATE POTASSIUM 875; 125 MG/1; MG/1
1 TABLET, FILM COATED ORAL 2 TIMES DAILY
Qty: 20 TABLET | Refills: 0 | Status: SHIPPED | OUTPATIENT
Start: 2021-02-05 | End: 2021-02-15

## 2021-02-05 NOTE — PROGRESS NOTES
You have chosen to receive care through a telehealth visit.  Do you consent to use a video/audio connection for your medical care today? Yes  Active parties: Linnette Marroquin PA-C, Prudence Kyle CMA, Leitcia Arreguin    Chief Complaint  Headache (sinus pressure)    Subjective          Leticia Arreguin presents to [facility] for headache.     History of Present Illness  Patient presents today with complaint of constant right sided headache and sinus pressure for the last week.  Sinus pressure is only bothersome in the right maxillary, right frontal and ethmoid areas and she is also experienced right ear fullness.  She reports that she experiences rhinorrhea year-round which is no worse than normal.  She denies any fever, chills, abnormal body aches, cough, SOA, sore throat, rash, loss of taste or smell, new visual disturbances, weakness, confusion or numbness.  She does have a history of migraine headaches which sometimes occur with rapid weather changes producing symptoms similar to current.  She denies any thunderclap headache onset and also denies this headache being the worst pain ever experienced.  She reports that Tylenol extra strength and ibuprofen do not help.  She was previously taking diclofenac prescribed by her rheumatologist which she discontinued couple of days into the headache as it was not helping.  She has tried using ice on her head with no help.  She has been taking Mucinex for a couple of days which has also not helped.  She declines any exposure to COVID-19.    She is unable to check her blood pressure at home due to her blood pressures have been broken but she plans to buy a new one tonight and will notify me blood pressure is elevated.    Objective   Vital Signs:   Temp 96.8 °F (36 °C)     Physical Exam  Vitals signs and nursing note reviewed.   Constitutional:       General: She is not in acute distress.     Appearance: Normal appearance. She is well-developed. She is  not ill-appearing, toxic-appearing or diaphoretic.   HENT:      Head: Normocephalic and atraumatic.      Right Ear: External ear normal.      Left Ear: External ear normal.      Nose: Rhinorrhea (patient reported) present.      Right Sinus: Maxillary sinus tenderness and frontal sinus tenderness present.      Left Sinus: No maxillary sinus tenderness or frontal sinus tenderness.   Eyes:      General: No scleral icterus.     Extraocular Movements: Extraocular movements intact.   Neck:      Musculoskeletal: Normal range of motion.      Comments: Neck appears normal  Pulmonary:      Effort: Pulmonary effort is normal. No respiratory distress.   Skin:     Coloration: Skin is not pale.      Findings: No erythema or rash.   Neurological:      Mental Status: She is alert and oriented to person, place, and time.      Coordination: Coordination normal.   Psychiatric:         Mood and Affect: Mood normal.         Thought Content: Thought content normal.         Judgment: Judgment normal.               Assessment and Plan    Problem List Items Addressed This Visit     None      Visit Diagnoses     Acute non-recurrent pansinusitis    -  Primary    Relevant Medications    Begin: Amoxicillin-clavulanate (Augmentin) 875-125 MG per tablet    Continue Mucinex, Tylenol and ibuprofen for symptom control.    ER with any acutely worsening symptoms.        I spent 20 minutes caring for Leticia on this date of service. This time includes time spent by me in the following activities:preparing for the visit, obtaining and/or reviewing a separately obtained history, performing a medically appropriate examination and/or evaluation , counseling and educating the patient/family/caregiver, ordering medications, tests, or procedures and documenting information in the medical record  Follow Up   No follow-ups on file.  Patient was given instructions and counseling regarding her condition or for health maintenance advice. Please see specific  information pulled into the AVS if appropriate.

## 2021-02-11 ENCOUNTER — APPOINTMENT (OUTPATIENT)
Dept: ULTRASOUND IMAGING | Facility: HOSPITAL | Age: 66
End: 2021-02-11

## 2021-02-15 ENCOUNTER — TELEPHONE (OUTPATIENT)
Dept: INTERNAL MEDICINE | Facility: CLINIC | Age: 66
End: 2021-02-15

## 2021-02-15 NOTE — TELEPHONE ENCOUNTER
Caller: Chrismendez Leticia KAMI    Relationship: Self    Best call back number: [phone]    What medication are you requesting: terazol    What are your current symptoms: pain, burning, itching, and swelling of vagina    How long have you been experiencing symptoms: about a week    Have you had these symptoms before:    [x] Yes  [] No    Have you been treated for these symptoms before:   [x] Yes  [] No    If a prescription is needed, what is your preferred pharmacy and phone number: [id number] - [place], [place] - [address] [facility] - [phone] [facility] [phone]      Additional notes:  Patient stated she just finished taking an antibiotic and thinks it has caused a yeast infection. Patient has tried using monistat and it has not helped

## 2021-02-17 ENCOUNTER — HOSPITAL ENCOUNTER (OUTPATIENT)
Dept: ULTRASOUND IMAGING | Facility: HOSPITAL | Age: 66
Discharge: HOME | End: 2021-02-17
Admitting: PHYSICIAN ASSISTANT

## 2021-02-17 PROCEDURE — 93880 EXTRACRANIAL BILAT STUDY: CPT

## 2021-03-01 RX ORDER — ESTRADIOL 2 MG/1
RING VAGINAL
Qty: 1 EACH | Refills: 4 | Status: SHIPPED | OUTPATIENT
Start: 2021-03-01 | End: 2022-01-25

## 2021-03-15 ENCOUNTER — HOSPITAL ENCOUNTER (OUTPATIENT)
Dept: ULTRASOUND IMAGING | Facility: HOSPITAL | Age: 66
Discharge: HOME | End: 2021-03-15
Admitting: NURSE PRACTITIONER

## 2021-03-15 DIAGNOSIS — M79.662 PAIN AND SWELLING OF LEFT LOWER LEG: ICD-10-CM

## 2021-03-15 DIAGNOSIS — M79.89 PAIN AND SWELLING OF LEFT LOWER LEG: ICD-10-CM

## 2021-03-15 DIAGNOSIS — N95.1 MENOPAUSAL SYMPTOMS: ICD-10-CM

## 2021-03-15 PROCEDURE — 93971 EXTREMITY STUDY: CPT

## 2021-03-15 NOTE — TELEPHONE ENCOUNTER
Ask her to follow up with Dr. Avila to discuss whether this medication should be continued or not.

## 2021-03-16 ENCOUNTER — TELEPHONE (OUTPATIENT)
Dept: INTERNAL MEDICINE | Facility: CLINIC | Age: 66
End: 2021-03-16

## 2021-03-16 DIAGNOSIS — M79.89 PAIN AND SWELLING OF LEFT LOWER LEG: Primary | ICD-10-CM

## 2021-03-16 DIAGNOSIS — M79.662 PAIN AND SWELLING OF LEFT LOWER LEG: Primary | ICD-10-CM

## 2021-03-16 NOTE — TELEPHONE ENCOUNTER
Pt said she did not req med to be sent, must be on auto fill at pharmacy, told her to call and let them know she does not need it

## 2021-03-16 NOTE — TELEPHONE ENCOUNTER
Caller: Olesmendez Leticia L    Relationship: Self    Best call back number: [phone]    What orders are you requesting (i.e. lab or imaging): REFERRAL FOR ORTHAPEDICS    In what timeframe would the patient need to come in: ASAP    Where will you receive your lab/imaging services: [facility].  PATIENT WAS UNABLE TO GIVE ME MORE INFORMATION.    Additional notes: PATIENT STATED THAT SHE WAS TOLD TO GO TO URGENT CARE YESTERDAY.  URGENT CARE TOLD THE PATIENT ABOUT MAYBE ORTHOPEDICS BUT PATIENT IS UNSURE.  PATIENT STATED THAT SHE WOULD LIKE TO SEE DR. [facility] IF SHE COULD.

## 2021-04-05 ENCOUNTER — TELEPHONE (OUTPATIENT)
Dept: NEUROLOGY | Facility: CLINIC | Age: 66
End: 2021-04-05

## 2021-04-21 DIAGNOSIS — K21.00 GASTROESOPHAGEAL REFLUX DISEASE WITH ESOPHAGITIS: ICD-10-CM

## 2021-04-21 DIAGNOSIS — R13.10 DYSPHAGIA, UNSPECIFIED TYPE: ICD-10-CM

## 2021-04-22 RX ORDER — OMEPRAZOLE 40 MG/1
CAPSULE, DELAYED RELEASE ORAL
Qty: 180 CAPSULE | Refills: 3 | Status: SHIPPED | OUTPATIENT
Start: 2021-04-22

## 2021-04-23 ENCOUNTER — OFFICE VISIT (OUTPATIENT)
Dept: INTERNAL MEDICINE | Facility: CLINIC | Age: 66
End: 2021-04-23

## 2021-04-23 VITALS
WEIGHT: 152 LBS | RESPIRATION RATE: 16 BRPM | HEIGHT: 69 IN | SYSTOLIC BLOOD PRESSURE: 120 MMHG | DIASTOLIC BLOOD PRESSURE: 78 MMHG | OXYGEN SATURATION: 98 % | HEART RATE: 62 BPM | BODY MASS INDEX: 22.51 KG/M2 | TEMPERATURE: 98 F

## 2021-04-23 DIAGNOSIS — R30.0 DYSURIA: ICD-10-CM

## 2021-04-23 DIAGNOSIS — N30.00 ACUTE CYSTITIS WITHOUT HEMATURIA: Primary | ICD-10-CM

## 2021-04-23 LAB
BILIRUB BLD-MCNC: NEGATIVE MG/DL
CLARITY, POC: ABNORMAL
COLOR UR: YELLOW
GLUCOSE UR STRIP-MCNC: NEGATIVE MG/DL
KETONES UR QL: NEGATIVE
LEUKOCYTE EST, POC: ABNORMAL
NITRITE UR-MCNC: NEGATIVE MG/ML
PH UR: 6 [PH] (ref 5–8)
PROT UR STRIP-MCNC: NEGATIVE MG/DL
RBC # UR STRIP: NEGATIVE /UL
SP GR UR: 1.01 (ref 1–1.03)
UROBILINOGEN UR QL: NORMAL

## 2021-04-23 PROCEDURE — 81003 URINALYSIS AUTO W/O SCOPE: CPT | Performed by: NURSE PRACTITIONER

## 2021-04-23 PROCEDURE — 99213 OFFICE O/P EST LOW 20 MIN: CPT | Performed by: NURSE PRACTITIONER

## 2021-04-23 RX ORDER — CYCLOBENZAPRINE HCL 5 MG
5 TABLET ORAL
COMMUNITY
Start: 2021-02-22 | End: 2021-10-08 | Stop reason: SDUPTHER

## 2021-04-23 RX ORDER — AMOXICILLIN AND CLAVULANATE POTASSIUM 875; 125 MG/1; MG/1
1 TABLET, FILM COATED ORAL 2 TIMES DAILY
Qty: 14 TABLET | Refills: 0 | Status: SHIPPED | OUTPATIENT
Start: 2021-04-23 | End: 2021-04-30

## 2021-04-23 NOTE — PROGRESS NOTES
"  Office Visit      Patient Name: Leticia Arreguin  : 1955   MRN: [id number]   Care Team: Patient Care Team:  Linnette Marroquin PA as PCP - General (Family Medicine)    Chief Complaint  Difficulty Urinating (X 1 week, has been trying natural at home remedies and increasing water intake but nothing is helping. C/o pressure in abdomen. )    Subjective     Subjective      Leticia Arreguin presents to [facility] for dysuria. Symptoms started 1 week ago. Also admits to suprapubic pressure, increased thirst, frequency, urgency, and fatigue.  Denies hematuria, hesitancy, fever, chills, and low back pain.  Has tried some natural remedies at home including cranberry extract, vitamin c, oregano, and green tea with no relief in her symptoms.  No history of kidney stones.    Review of Systems   Constitutional: Negative for chills, fatigue and fever.   Respiratory: Negative for shortness of breath.    Cardiovascular: Negative for chest pain.   Gastrointestinal: Negative for abdominal distention, abdominal pain, constipation and nausea.   Genitourinary: Positive for dysuria, frequency and urgency. Negative for decreased libido, decreased urine volume, difficulty urinating, flank pain and hematuria.   Neurological: Negative for headache.   Psychiatric/Behavioral: Negative for sleep disturbance.       Objective     Objective   Vital Signs:   /78 (BP Location: Right arm, Patient Position: Sitting, Cuff Size: Adult)   Pulse 62   Temp 98 °F (36.7 °C) (Temporal)   Resp 16   Ht 175.3 cm (69\")   Wt 68.9 kg (152 lb)   SpO2 98%   BMI 22.45 kg/m²     Physical Exam  Vitals and nursing note reviewed.   Constitutional:       General: She is not in acute distress.     Appearance: Normal appearance.   Cardiovascular:      Rate and Rhythm: Normal rate and regular rhythm.      Heart sounds: Normal heart sounds. No murmur heard.     Pulmonary:      Effort: Pulmonary effort is normal. No " respiratory distress.      Breath sounds: Normal breath sounds. No wheezing.   Abdominal:      General: Bowel sounds are normal. There is no distension.      Palpations: Abdomen is soft.      Tenderness: There is abdominal tenderness in the suprapubic area. There is no right CVA tenderness or left CVA tenderness.   Skin:     General: Skin is warm and dry.      Findings: No rash.   Neurological:      Mental Status: She is alert.   Psychiatric:         Mood and Affect: Mood normal.         Behavior: Behavior normal.          Assessment / Plan      Assessment/Plan   Problem List Items Addressed This Visit     None      Visit Diagnoses     Acute cystitis without hematuria    -  Primary  Dysuria  Brief Urine Lab Results  (Last result in the past 365 days)      Color   Clarity   Blood   Leuk Est   Nitrite   Protein   CREAT   Urine HCG        04/23/21 1805 Yellow Cloudy Negative [address]/ul Negative Negative             Urinalysis today indicative for UTI. Prescribed Augmentin due to multiple drug allergies, she has tolerated this in the past.  Urine culture sent to ensure correct antimicrobial therapy chosen.  Encouraged to drink plenty of water, avoid fruit juices (including cranberry juice), and caffeinated beverages, always wipe front to back, void post-coitally, and finish prescribed antibiotic regimen. RTC if symptoms worsen or fail to improve.           Follow Up   Return if symptoms worsen or fail to improve.  Patient was given instructions and counseling regarding her condition or for health maintenance advice. Please see specific information pulled into the AVS if appropriate.     PIERRE Willis  [facility]

## 2021-04-29 LAB
BACTERIA UR CULT: ABNORMAL
BACTERIA UR CULT: ABNORMAL
OTHER ANTIBIOTIC SUSC ISLT: ABNORMAL

## 2021-06-24 ENCOUNTER — TELEPHONE (OUTPATIENT)
Dept: INTERNAL MEDICINE | Facility: CLINIC | Age: 66
End: 2021-06-24

## 2021-06-24 ENCOUNTER — OFFICE VISIT (OUTPATIENT)
Dept: INTERNAL MEDICINE | Facility: CLINIC | Age: 66
End: 2021-06-24

## 2021-06-24 VITALS
HEART RATE: 67 BPM | SYSTOLIC BLOOD PRESSURE: 130 MMHG | TEMPERATURE: 97.1 F | BODY MASS INDEX: 23.11 KG/M2 | WEIGHT: 156 LBS | HEIGHT: 69 IN | OXYGEN SATURATION: 99 % | DIASTOLIC BLOOD PRESSURE: 74 MMHG

## 2021-06-24 DIAGNOSIS — I10 BENIGN ESSENTIAL HYPERTENSION: Primary | ICD-10-CM

## 2021-06-24 DIAGNOSIS — E78.5 DYSLIPIDEMIA: ICD-10-CM

## 2021-06-24 DIAGNOSIS — R21 RASH AND NONSPECIFIC SKIN ERUPTION: ICD-10-CM

## 2021-06-24 DIAGNOSIS — R60.0 BILATERAL LEG EDEMA: ICD-10-CM

## 2021-06-24 DIAGNOSIS — L57.0 ACTINIC KERATOSIS: ICD-10-CM

## 2021-06-24 LAB
ALBUMIN SERPL-MCNC: 4.8 G/DL (ref 3.5–5.2)
ALBUMIN/GLOB SERPL: 2.1 G/DL
ALP SERPL-CCNC: 77 U/L (ref 39–117)
ALT SERPL-CCNC: 21 U/L (ref 1–33)
AST SERPL-CCNC: 18 U/L (ref 1–32)
BILIRUB SERPL-MCNC: 0.3 MG/DL (ref 0–1.2)
BUN SERPL-MCNC: 18 MG/DL (ref 8–23)
BUN/CREAT SERPL: 21.4 (ref 7–25)
CALCIUM SERPL-MCNC: 10 MG/DL (ref 8.6–10.5)
CHLORIDE SERPL-SCNC: 102 MMOL/L (ref 98–107)
CHOLEST SERPL-MCNC: 272 MG/DL (ref 0–200)
CO2 SERPL-SCNC: 27.2 MMOL/L (ref 22–29)
CREAT SERPL-MCNC: 0.84 MG/DL (ref 0.57–1)
GLOBULIN SER CALC-MCNC: 2.3 GM/DL
GLUCOSE SERPL-MCNC: 86 MG/DL (ref 65–99)
HDLC SERPL-MCNC: 78 MG/DL (ref 40–60)
LDLC SERPL CALC-MCNC: 185 MG/DL (ref 0–100)
POTASSIUM SERPL-SCNC: 4.4 MMOL/L (ref 3.5–5.2)
PROT SERPL-MCNC: 7.1 G/DL (ref 6–8.5)
SODIUM SERPL-SCNC: 139 MMOL/L (ref 136–145)
TRIGL SERPL-MCNC: 62 MG/DL (ref 0–150)
VLDLC SERPL CALC-MCNC: 9 MG/DL (ref 5–40)

## 2021-06-24 PROCEDURE — 99214 OFFICE O/P EST MOD 30 MIN: CPT | Performed by: PHYSICIAN ASSISTANT

## 2021-06-24 RX ORDER — NABUMETONE 500 MG/1
TABLET, FILM COATED ORAL
COMMUNITY
Start: 2021-06-02 | End: 2022-01-25

## 2021-06-24 RX ORDER — TRIAMTERENE AND HYDROCHLOROTHIAZIDE 37.5; 25 MG/1; MG/1
1 TABLET ORAL DAILY
Qty: 90 TABLET | Refills: 3 | Status: SHIPPED | OUTPATIENT
Start: 2021-06-24 | End: 2022-06-22 | Stop reason: SDUPTHER

## 2021-06-24 RX ORDER — MULTIPLE VITAMINS W/ MINERALS TAB 9MG-400MCG
1 TAB ORAL DAILY
COMMUNITY
End: 2021-07-27

## 2021-06-24 NOTE — TELEPHONE ENCOUNTER
Caller: Leticia Arreguin    Relationship: Self    Best call back number: [phone]     What was the call regarding: PATIENT CALLED TO GIVE CAITLYN BUENO A MEDICATION NAME.  PATIENT STATED IT WAS AMLODIPINE BESYLATE.     Do you require a callback: IF NEEDED

## 2021-06-24 NOTE — PROGRESS NOTES
"     Follow Up Office Visit      Patient Name: Leticia Arreguin  : 1955   MRN: [id number]     Chief Complaint:    Chief Complaint   Patient presents with   • Follow-up     blood pressure elevated       History of Present Illness: Leticia Arreguin is a 65 y.o. female who is here today with concern of elevated blood pressure. Every time she sees her rheumatologist her blood pressure is very elevated. She checks her BP at home which runs 140s/70s. She feels her heart pounding out of her chest, particularly at night. Headaches are actually improved compared to previously. Both legs swell by evening which has been long-term but worsening. She sometimes gets intensely hot and sweaty in the face at random times. Blood pressure seems to be most elevated at night which she feels is due to intense sleep issues with nightmares which triggers anxiety. No excessive sodium use. She is taking triamterene-hctz 1/2 tablet daily.     Rash on both cheeks around to jaws with itching and hot feeling and dryness for around 3 weeks. Left side is worse than right. There is some redness. She is using moisturizers which do not seem to be helping. She is in the sun a lot. Her rheumatologist wanted her to take amlodipine to \"improve circulation\" for raynauds but she took it for a couple of days then stopped due to not liking how it made her feel.     She requests to recheck lipid panel today as she has been taking colest-off for the last 5 months.      Subjective      I have reviewed and the following portions of the patient's history were updated as appropriate: past family history, past medical history, past social history, past surgical history and problem list.      Current Outpatient Medications:   •  ASHWAGANDHA PO, Take  by mouth., Disp: , Rfl:   •  cyclobenzaprine (FLEXERIL) 5 MG tablet, Take 5 mg by mouth every night at bedtime., Disp: , Rfl:   •  estradiol (Estring) 2 MG vaginal ring, INSERT ONE RING INTO THE VAGINA EVERY THREE " "MONTHS., Disp: 1 each, Rfl: 4  •  Fexofenadine HCl (MUCINEX ALLERGY PO), Take  by mouth., Disp: , Rfl:   •  Glucosamine-Chondroitin (OSTEO BI-FLEX REGULAR STRENGTH PO), Take  by mouth., Disp: , Rfl:   •  MAGNESIUM PO, Take  by mouth., Disp: , Rfl:   •  multivitamin with minerals (VITAMIN D3 COMPLETE PO), Take 1 tablet by mouth Daily., Disp: , Rfl:   •  nabumetone (RELAFEN) 500 MG tablet, , Disp: , Rfl:   •  omeprazole (priLOSEC) 40 MG capsule, TAKE 1 CAPSULE BY MOUTH TWICE A DAY (Patient taking differently: Take  by mouth Daily.), Disp: 180 capsule, Rfl: 3  •  Plant Sterols and Stanols (CHOLEST OFF PO), Take  by mouth., Disp: , Rfl:   •  Probiotic Product (PROBIOTIC-10) capsule, Take  by mouth., Disp: , Rfl:   •  triamterene-hydrochlorothiazide (MAXZIDE-25) 37.5-25 MG per tablet, Take 1 tablet by mouth Daily., Disp: 90 tablet, Rfl: 3    Allergies   Allergen Reactions   • Sulfa Antibiotics Anaphylaxis   • Sulfamethoxazole-Trimethoprim Anaphylaxis   • Statins Myalgia   • Ciprofloxacin Rash   • Clarithromycin Rash   • Gabapentin Rash   • Nitrofurantoin Nausea Only   • Penicillins Rash   • Risperidone Rash   • Zyprexa [Olanzapine] Irritability     Extreme agitation       Objective     Physical Exam:  Vital Signs:   Vitals:    06/24/21 0903 06/24/21 0918   BP: 136/82 130/74   Pulse: 67    Temp: 97.1 °F (36.2 °C)    SpO2: 99%    Weight: 70.8 kg (156 lb)    Height: 175.3 cm (69.02\")      Body mass index is 23.03 kg/m².    Physical Exam  Vitals and nursing note reviewed.   Constitutional:       General: She is not in acute distress.     Appearance: Normal appearance. She is well-developed and normal weight. She is not ill-appearing, toxic-appearing or diaphoretic.   HENT:      Head: Normocephalic and atraumatic.      Right Ear: External ear normal.      Left Ear: External ear normal.   Eyes:      General: No scleral icterus.     Extraocular Movements: Extraocular movements intact.      Conjunctiva/sclera: Conjunctivae " normal.      Pupils: Pupils are equal, round, and reactive to light.   Cardiovascular:      Rate and Rhythm: Normal rate and regular rhythm.  No extrasystoles are present.     Heart sounds: Normal heart sounds. No murmur heard.   No friction rub. No gallop.    Pulmonary:      Effort: Pulmonary effort is normal. No respiratory distress.      Breath sounds: Normal breath sounds. No wheezing, rhonchi or rales.   Chest:      Chest wall: No tenderness.   Abdominal:      General: Bowel sounds are normal.      Palpations: Abdomen is soft.      Tenderness: There is no abdominal tenderness. There is no right CVA tenderness or left CVA tenderness.   Musculoskeletal:         General: No tenderness or deformity. Normal range of motion.      Cervical back: Normal range of motion and neck supple. No rigidity or tenderness.      Right lower leg: Pitting Edema (trace, Negative Homans sign) present.      Left lower le+ Pitting Edema ( Negative Homans sign) present.   Lymphadenopathy:      Cervical: No cervical adenopathy.   Skin:     General: Skin is warm and dry.      Capillary Refill: Capillary refill takes less than 2 seconds.      Coloration: Skin is not cyanotic, jaundiced, mottled, pale or sallow.      Findings: Lesion (large actinic keratosis lesion below left eye) present. No erythema or rash (unable to appreciate any rash, erythema or abnormal skin texture, although patient is wearing makeup).          Neurological:      General: No focal deficit present.      Mental Status: She is alert and oriented to person, place, and time.      Cranial Nerves: No cranial nerve deficit.      Sensory: No sensory deficit.      Motor: No weakness or abnormal muscle tone.      Coordination: Coordination normal.      Gait: Gait normal.      Deep Tendon Reflexes: Reflexes normal.   Psychiatric:         Mood and Affect: Mood normal.         Behavior: Behavior normal.         Thought Content: Thought content normal.         Judgment:  Judgment normal.              Common labs    Common Labsle 7/24/20 7/24/20 9/25/20 1/28/21 1/28/21 1/28/21    1058 1058  0954 0954 0954   Glucose  91 83 89     BUN  13 11 14     Creatinine  0.70 0.98 0.74     eGFR Non  Am  84 57 (A) 79     eGFR  Am  102 69 95     Sodium  140 138 138     Potassium  3.9 4.1 4.0     Chloride  102 101 102     Calcium  9.6 9.2 9.4     Total Protein  7.0  6.9     Albumin  4.60 4.60 4.40     Total Bilirubin  0.4  0.4     Alkaline Phosphatase  71  65     AST (SGOT)  15  20     ALT (SGPT)  13  15     WBC      7.20   Hemoglobin      13.8   Hematocrit      40.2   Platelets      337   Total Cholesterol 258 (A)    265 (A)    Triglycerides 60    68    HDL Cholesterol 86 (A)    75 (A)    LDL Cholesterol  160 (A)    179 (A)    (A) Abnormal value       Comments are available for some flowsheets but are not being displayed.               Assessment / Plan      Assessment/Plan:   Diagnoses and all orders for this visit:    1. Benign essential hypertension (Primary)  -     Comprehensive Metabolic Panel  -     Dose increase: Triamterene-hydrochlorothiazide (MAXZIDE-25) 37.5-25 MG per tablet; Take 1 tablet by mouth Daily.  Dispense: 90 tablet; Refill: 3  Follow heart healthy/low salt diet.  Avoid processed foods.  Monitor blood pressure as discussed.  Exercise as tolerated up to 30 minutes 5 days per week.  Take medication as prescribed.    2. Bilateral leg edema  -     Comprehensive Metabolic Panel  -     Dose increase: Triamterene-hydrochlorothiazide (MAXZIDE-25) 37.5-25 MG per tablet; Take 1 tablet by mouth Daily.  Dispense: 90 tablet; Refill: 3    3. Rash and nonspecific skin eruption  4. Actinic keratosis  She was advised that she may try using OTC cortisone cream thin layer on the rash at bedtime as needed for up to 5 days to see if rash will resolve.  She will let me know later today which dermatologist she would like to be referred to for management of actinic keratosis and rash if  not resolved.    5. Dyslipidemia  -     Lipid Panel  Taking Cholest-Off for 5 months. Recheck.        Return in around 1 month for Medicare wellness visit with hypertension and leg edema recheck.      I spent 30 minutes caring for Leticia on this date of service. This time includes time spent by me in the following activities:preparing for the visit, reviewing tests, performing a medically appropriate examination and/or evaluation , counseling and educating the patient/family/caregiver, ordering medications, tests, or procedures and documenting information in the medical record    Follow Up:   Return in about 1 month (around 7/24/2021) for medicare wellness.    Patient was given instructions and counseling regarding her condition or for health maintenance advice. Please see specific information pulled into the AVS if appropriate.     Linnette Marroquin PA-C  [facility] [address] [facility]     Please note that portions of this note may have been completed with a voice recognition program. Efforts were made to edit the dictations, but occasionally words are mistranscribed.

## 2021-06-29 ENCOUNTER — TELEPHONE (OUTPATIENT)
Dept: INTERNAL MEDICINE | Facility: CLINIC | Age: 66
End: 2021-06-29

## 2021-06-29 DIAGNOSIS — E78.5 DYSLIPIDEMIA: Primary | ICD-10-CM

## 2021-06-29 NOTE — TELEPHONE ENCOUNTER
Provider: XIMENA     Caller: MAGALY WIN    Phone Number: [phone]    Reason for Call: PATIENT STATED THAT DR. BUENO ASKED HER IF SHE WOULD L BELA TO BE ON A STATIN FOR HER CHOLESTEROL AND SHE IS STATING YES.

## 2021-06-30 RX ORDER — PRAVASTATIN SODIUM 20 MG
20 TABLET ORAL NIGHTLY
Qty: 90 TABLET | Refills: 3 | Status: SHIPPED | OUTPATIENT
Start: 2021-06-30 | End: 2023-03-06

## 2021-07-27 ENCOUNTER — OFFICE VISIT (OUTPATIENT)
Dept: INTERNAL MEDICINE | Facility: CLINIC | Age: 66
End: 2021-07-27

## 2021-07-27 VITALS
WEIGHT: 155 LBS | HEART RATE: 71 BPM | OXYGEN SATURATION: 99 % | SYSTOLIC BLOOD PRESSURE: 136 MMHG | BODY MASS INDEX: 22.96 KG/M2 | HEIGHT: 69 IN | DIASTOLIC BLOOD PRESSURE: 82 MMHG | TEMPERATURE: 97.3 F

## 2021-07-27 DIAGNOSIS — L08.9 INFECTION, FACE: ICD-10-CM

## 2021-07-27 DIAGNOSIS — F41.9 ANXIETY: ICD-10-CM

## 2021-07-27 DIAGNOSIS — F33.2 SEVERE EPISODE OF RECURRENT MAJOR DEPRESSIVE DISORDER, WITHOUT PSYCHOTIC FEATURES (HCC): ICD-10-CM

## 2021-07-27 DIAGNOSIS — Z13.820 SCREENING FOR OSTEOPOROSIS: ICD-10-CM

## 2021-07-27 DIAGNOSIS — Z78.0 POSTMENOPAUSAL: ICD-10-CM

## 2021-07-27 DIAGNOSIS — Z00.00 WELCOME TO MEDICARE PREVENTIVE VISIT: Primary | ICD-10-CM

## 2021-07-27 PROCEDURE — G0439 PPPS, SUBSEQ VISIT: HCPCS | Performed by: PHYSICIAN ASSISTANT

## 2021-07-27 PROCEDURE — 93000 ELECTROCARDIOGRAM COMPLETE: CPT | Performed by: PHYSICIAN ASSISTANT

## 2021-07-27 RX ORDER — AMOXICILLIN AND CLAVULANATE POTASSIUM 875; 125 MG/1; MG/1
1 TABLET, FILM COATED ORAL 2 TIMES DAILY
Qty: 14 TABLET | Refills: 0 | Status: SHIPPED | OUTPATIENT
Start: 2021-07-27 | End: 2021-08-03

## 2021-07-27 RX ORDER — AMLODIPINE BESYLATE 2.5 MG/1
2.5 TABLET ORAL DAILY
COMMUNITY
Start: 2021-06-02 | End: 2021-07-27

## 2021-07-27 NOTE — PROGRESS NOTES
"The ABCs of the Annual Wellness Visit  Welcome to Medicare Visit    Chief Complaint   Patient presents with   • Welcome To Medicare       Subjective   History of Present Illness:    Increased Maxide to full dose on 6/24/2021 due to report of nighttime hypertension and increased bilateral leg edema. She has been taking the full tablet as directed. She reports her blood pressure has improved.     She was advised to start pravastatin 6/30/21 but it has caused arthralgias and fatigue for the last 3 weeks.     Recommended referral to dermatology for AK on left cheek. She saw them last Tuesday and the area was treated with cryotherapy. The area has been swollen and sore since then. She has kept the area clean and not used makeup. The area has been warm.     Constantly feels anxious and as if her fight or flight response is always activated. She feels counseling would be beneficial but always cancels appointments due to not feeling ready to \"rip the band aid off\".    She refuses colonoscopy and pap smear at this time.         Leticia Arreguin is a 65 y.o. female who presents for a  Welcome to Medicare Visit.    HEALTH RISK ASSESSMENT    Recent Hospitalizations:  No hospitalization(s) within the last year.    Current Medical Providers:  Patient Care Team:  Linnette Marroquin PA as PCP - General (Family Medicine)    Smoking Status:  Social History     Tobacco Use   Smoking Status Never Smoker   Smokeless Tobacco Never Used       Alcohol Consumption:  Social History     Substance and Sexual Activity   Alcohol Use No       Depression Screen:   PHQ-2/PHQ-9 Depression Screening 7/27/2021   Little interest or pleasure in doing things 0   Feeling down, depressed, or hopeless 0   Total Score 0       Fall Risk Screen:  STEADI Fall Risk Assessment was completed, and patient is at LOW risk for falls.Assessment completed on:1/28/2021    Health Habits and Functional and Cognitive Screening:  Functional & Cognitive Status 7/27/2021 "   Do you have difficulty preparing food and eating? No   Do you have difficulty bathing yourself, getting dressed or grooming yourself? No   Do you have difficulty using the toilet? No   Do you have difficulty moving around from place to place? No   Do you have trouble with steps or getting out of a bed or a chair? No   Current Diet Well Balanced Diet   Dental Exam Up to date   Eye Exam Up to date   Exercise (times per week) 7 times per week   Current Exercises Include Walking;Swimming   Current Exercise Activities Include -   Do you need help using the phone?  No   Are you deaf or do you have serious difficulty hearing?  Yes   Do you need help with transportation? No   Do you need help shopping? No   Do you need help preparing meals?  No   Do you need help with housework?  No   Do you need help with laundry? No   Do you need help taking your medications? No   Do you need help managing money? No   Do you ever drive or ride in a car without wearing a seat belt? No   Have you felt unusual stress, anger or loneliness in the last month? Yes   Who do you live with? Spouse   If you need help, do you have trouble finding someone available to you? No   Have you been bothered in the last four weeks by sexual problems? Yes   Do you have difficulty concentrating, remembering or making decisions? Yes         Does the patient have evidence of cognitive impairment? No    Asprin use counseling:Does not need ASA (and currently is not on it)    Visual Acuity:    No exam data present    Age-appropriate Screening Schedule:  Refer to the list below for future screening recommendations based on patient's age, sex and/or medical conditions. Orders for these recommended tests are listed in the plan section. The patient has been provided with a written plan.    Health Maintenance   Topic Date Due   • DXA SCAN  01/18/2020   • PAP SMEAR  07/20/2021   • TDAP/TD VACCINES (1 - Tdap) 07/27/2022 (Originally 12/29/1974)   • ZOSTER VACCINE (1 of 2)  08/05/2022 (Originally 12/29/2005)   • INFLUENZA VACCINE  10/01/2021   • LIPID PANEL  06/24/2022   • MAMMOGRAM  12/21/2022          The following portions of the patient's history were reviewed and updated as appropriate: allergies, current medications, past family history, past medical history, past social history, past surgical history and problem list.    Outpatient Medications Prior to Visit   Medication Sig Dispense Refill   • ASHWAGANDHA PO Take  by mouth.     • cyclobenzaprine (FLEXERIL) 5 MG tablet Take 5 mg by mouth every night at bedtime.     • Fexofenadine HCl (MUCINEX ALLERGY PO) Take  by mouth.     • Glucosamine-Chondroitin (OSTEO BI-FLEX REGULAR STRENGTH PO) Take  by mouth.     • MAGNESIUM PO Take  by mouth.     • omeprazole (priLOSEC) 40 MG capsule TAKE 1 CAPSULE BY MOUTH TWICE A DAY (Patient taking differently: Take  by mouth Daily.) 180 capsule 3   • pravastatin (Pravachol) 20 MG tablet Take 1 tablet by mouth Every Night. 90 tablet 3   • Probiotic Product (PROBIOTIC-10) capsule Take  by mouth.     • triamterene-hydrochlorothiazide (MAXZIDE-25) 37.5-25 MG per tablet Take 1 tablet by mouth Daily. 90 tablet 3   • estradiol (Estring) 2 MG vaginal ring INSERT ONE RING INTO THE VAGINA EVERY THREE MONTHS. 1 each 4   • nabumetone (RELAFEN) 500 MG tablet      • amLODIPine (NORVASC) 2.5 MG tablet Take 2.5 mg by mouth Daily.     • multivitamin with minerals (VITAMIN D3 COMPLETE PO) Take 1 tablet by mouth Daily.     • Plant Sterols and Stanols (CHOLEST OFF PO) Take  by mouth.       No facility-administered medications prior to visit.       Patient Active Problem List   Diagnosis   • Asthma   • Benign essential hypertension   • Brachial (cervical) neuritis   • Constipation   • Gastroesophageal reflux disease with esophagitis   • Fibromyalgia   • Hearing loss   • Common migraine with intractable migraine   • Tinnitus   • Cobalamin deficiency   • Vitamin D deficiency   • Major depression, recurrent (CMS/HCC)   •  Dyslipidemia   • Migraine   • Insomnia   • Osteoarthritis   • Anxiety       Advanced Care Planning:  ACP discussion was held with the patient during this visit. Patient does not have an advance directive, information provided.    Review of Systems   Constitutional: Positive for fatigue. Negative for appetite change, chills, fever and unexpected weight change.   HENT: Negative for congestion, ear pain, nosebleeds, postnasal drip, rhinorrhea, sore throat, tinnitus and trouble swallowing.    Eyes: Negative for photophobia, discharge and visual disturbance.   Respiratory: Negative for cough, chest tightness, shortness of breath and wheezing.    Cardiovascular: Negative for chest pain, palpitations and leg swelling.   Gastrointestinal: Negative for abdominal distention, abdominal pain, blood in stool, constipation, diarrhea, nausea and vomiting.   Endocrine: Negative for cold intolerance, heat intolerance, polydipsia, polyphagia and polyuria.   Genitourinary: Negative.    Musculoskeletal: Positive for arthralgias and myalgias. Negative for back pain, joint swelling, neck pain and neck stiffness.   Skin: Negative for color change, pallor, rash and wound.   Allergic/Immunologic: Negative for environmental allergies, food allergies and immunocompromised state.   Neurological: Positive for headaches. Negative for dizziness, tremors, seizures, weakness and numbness.   Hematological: Negative for adenopathy. Does not bruise/bleed easily.   Psychiatric/Behavioral: Positive for agitation, dysphoric mood and sleep disturbance. Negative for behavioral problems, confusion, hallucinations, self-injury and suicidal ideas. The patient is nervous/anxious.        Compared to one year ago, the patient feels her physical health is the same.  Compared to one year ago, the patient feels her mental health is worse.    Reviewed chart for potential of high risk medication in the elderly: yes  Reviewed chart for potential of harmful drug  "interactions in the elderly:yes    Objective         Vitals:    07/27/21 0838   BP: 136/82   Pulse: 71   Temp: 97.3 °F (36.3 °C)   SpO2: 99%   Weight: 70.3 kg (155 lb)   Height: 175.3 cm (69.02\")   PainSc:   6       Body mass index is 22.88 kg/m².  Discussed the patient's BMI with her. The BMI is in the acceptable range.    Physical Exam  Vitals and nursing note reviewed.   Constitutional:       General: She is not in acute distress.     Appearance: Normal appearance. She is well-developed and normal weight. She is not ill-appearing, toxic-appearing or diaphoretic.   HENT:      Head: Normocephalic and atraumatic.      Right Ear: External ear normal.      Left Ear: External ear normal.   Eyes:      General: No scleral icterus.     Extraocular Movements: Extraocular movements intact.      Conjunctiva/sclera: Conjunctivae normal.      Pupils: Pupils are equal, round, and reactive to light.   Cardiovascular:      Rate and Rhythm: Normal rate and regular rhythm.      Heart sounds: Normal heart sounds. No murmur heard.   No friction rub. No gallop.    Pulmonary:      Effort: Pulmonary effort is normal. No respiratory distress.      Breath sounds: Normal breath sounds. No wheezing, rhonchi or rales.   Chest:      Chest wall: No tenderness.   Abdominal:      General: Bowel sounds are normal.      Palpations: Abdomen is soft.      Tenderness: There is no abdominal tenderness.   Musculoskeletal:         General: No tenderness or deformity. Normal range of motion.      Cervical back: Normal range of motion and neck supple.      Right lower leg: No edema.      Left lower leg: No edema.   Skin:     General: Skin is warm and dry.      Capillary Refill: Capillary refill takes less than 2 seconds.      Findings: Erythema and wound present. No rash.          Neurological:      Mental Status: She is alert and oriented to person, place, and time.      Cranial Nerves: No cranial nerve deficit.      Sensory: No sensory deficit.      " Motor: No weakness or abnormal muscle tone.      Coordination: Coordination normal.      Gait: Gait normal.      Deep Tendon Reflexes: Reflexes normal.   Psychiatric:         Attention and Perception: Attention and perception normal.         Mood and Affect: Mood is anxious and depressed. Affect is tearful. Affect is not labile, blunt, angry or inappropriate.         Speech: Speech normal.         Behavior: Behavior normal. Behavior is cooperative.         Thought Content: Thought content normal.         Cognition and Memory: Cognition and memory normal.         Judgment: Judgment normal.         Lab Results   Component Value Date    GLU 86 06/24/2021    CHLPL 272 (H) 06/24/2021    TRIG 62 06/24/2021    HDL 78 (H) 06/24/2021     (H) 06/24/2021    VLDL 9 06/24/2021          ECG 12 Lead    Date/Time: 7/27/2021 9:05 AM  Performed by: Linnette Marroquin PA  Authorized by: Linnette Marroquin PA   Comparison: compared with previous ECG from 10/5/2016  Comparison to previous ECG: Previous EKG showed incomplete RBBB which does not appear today.  Rhythm: sinus rhythm  Rate: normal  BPM: 70  Conduction: conduction normal  ST Segments: ST segments normal  T Waves: T waves normal  QRS axis: normal  Other: no other findings    Clinical impression: normal ECG            Assessment/Plan   Medicare Risks and Personalized Health Plan  CMS Preventative Services Quick Reference  Advance Directive Discussion  Colon Cancer Screening  Depression/Dysphoria  Inadequate Social Support, Isolation, Loneliness, Lack of Transportation, Financial Difficulties, or Caregiver Stress   Osteoporosis Risk    The above risks/problems have been discussed with the patient.  Pertinent information has been shared with the patient in the After Visit Summary.  Follow up plans and orders are seen below in the Assessment/Plan Section.    Diagnoses and all orders for this visit:    1. Welcome to Medicare preventive visit (Primary)  -     ECG 12  Lead    2. BMI 22.0-22.9, adult  Patient's Body mass index is 22.88 kg/m². indicating that she is within normal range (BMI 18.5-24.9). No BMI management plan needed..    3. Infection, face  -    Begin: Amoxicillin-clavulanate (Augmentin) 875-125 MG per tablet; Take 1 tablet by mouth 2 (Two) Times a Day for 7 days.  Dispense: 14 tablet; Refill: 0  Keep wound clean with antibacterial soap and warm water at least twice daily. Pat dry with clean towel. Cover wound if at any risk for exposure to bacteria. Avoid submerging wound until fully healed. Call or RTC with any signs of infection including redness, swelling, abnormal warmth, streaking, fever, chills, rapid heart rate or nausea.     4. Severe episode of recurrent major depressive disorder, without psychotic features (CMS/HCC)  -     Ambulatory Referral to Behavioral Health    5. Anxiety  -     Ambulatory Referral to Behavioral Health    6. Screening for osteoporosis  -     DEXA Bone Density Axial; Future    7. Postmenopausal  -     DEXA Bone Density Axial; Future        She refuses colonoscopy and pap smear at this time.       Follow Up:  Return in about 6 months (around 1/27/2022) for Next scheduled follow up HTN.     An After Visit Summary and PPPS were given to the patient.

## 2021-07-27 NOTE — PATIENT INSTRUCTIONS
Medicare Wellness  Personal Prevention Plan of Service     Date of Office Visit:  2021  Encounter Provider:  SEBASTIAN Hobbs  Place of Service:  [facility]  Patient Name: Leticia Arreguin  :  1955    As part of the Medicare Wellness portion of your visit today, we are providing you with this personalized preventive plan of services (PPPS). This plan is based upon recommendations of the [place] Preventive Services Task Force (USPSTF) and the Advisory Committee on Immunization Practices (ACIP).    This lists the preventive care services that should be considered, and provides dates of when you are due. Items listed as completed are up-to-date and do not require any further intervention.    Health Maintenance   Topic Date Due   • DXA SCAN  2020   • COLORECTAL CANCER SCREENING  2020   • PAP SMEAR  2021   • TDAP/TD VACCINES (1 - Tdap) 2022 (Originally 1974)   • ZOSTER VACCINE (1 of 2) 2022 (Originally 2005)   • Pneumococcal Vaccine 65+ (1 of 2 - PPSV23) 2022 (Originally 1961)   • INFLUENZA VACCINE  10/01/2021   • LIPID PANEL  2022   • ANNUAL WELLNESS VISIT  2022   • MAMMOGRAM  2022   • HEPATITIS C SCREENING  Completed   • COVID-19 Vaccine  Completed       Orders Placed This Encounter   Procedures   • DEXA Bone Density Axial     Standing Status:   Future     Standing Expiration Date:   2022     Order Specific Question:   Reason for Exam:     Answer:   osteoporosis screening     Order Specific Question:   Release to patient     Answer:   Immediate   • Ambulatory Referral to Behavioral Health     Referral Priority:   Routine     Referral Type:   Behavorial Health/Psych     Referral Reason:   Specialty Services Required     Referred to Provider:   Chantale Velazquez APRN     Requested Specialty:   Behavioral Health     Number of Visits Requested:   1       Return in about 6 months (around  1/27/2022) for Next scheduled follow up HTN.

## 2021-08-10 ENCOUNTER — APPOINTMENT (OUTPATIENT)
Dept: BONE DENSITY | Facility: HOSPITAL | Age: 66
End: 2021-08-10

## 2021-08-10 DIAGNOSIS — Z78.0 POSTMENOPAUSAL: ICD-10-CM

## 2021-08-10 DIAGNOSIS — Z13.820 SCREENING FOR OSTEOPOROSIS: ICD-10-CM

## 2021-08-10 PROCEDURE — 77080 DXA BONE DENSITY AXIAL: CPT

## 2021-10-08 ENCOUNTER — OFFICE VISIT (OUTPATIENT)
Dept: INTERNAL MEDICINE | Facility: CLINIC | Age: 66
End: 2021-10-08

## 2021-10-08 ENCOUNTER — TELEPHONE (OUTPATIENT)
Dept: INTERNAL MEDICINE | Facility: CLINIC | Age: 66
End: 2021-10-08

## 2021-10-08 VITALS
WEIGHT: 161 LBS | BODY MASS INDEX: 23.85 KG/M2 | SYSTOLIC BLOOD PRESSURE: 128 MMHG | TEMPERATURE: 97.1 F | HEIGHT: 69 IN | DIASTOLIC BLOOD PRESSURE: 72 MMHG | OXYGEN SATURATION: 100 % | HEART RATE: 85 BPM

## 2021-10-08 DIAGNOSIS — H65.93 OME (OTITIS MEDIA WITH EFFUSION), BILATERAL: Primary | ICD-10-CM

## 2021-10-08 DIAGNOSIS — H93.13 TINNITUS OF BOTH EARS: ICD-10-CM

## 2021-10-08 PROCEDURE — 99213 OFFICE O/P EST LOW 20 MIN: CPT | Performed by: PHYSICIAN ASSISTANT

## 2021-10-08 RX ORDER — CYCLOBENZAPRINE HCL 10 MG
10 TABLET ORAL
Qty: 30 TABLET | Refills: 1 | Status: SHIPPED | OUTPATIENT
Start: 2021-10-08

## 2021-10-08 NOTE — PROGRESS NOTES
Follow Up Office Visit      Patient Name: Leticia Arreguin  : 1955   MRN: [id number]     Chief Complaint:    Chief Complaint   Patient presents with   • Dizziness     nausea, possibly from cymbalta       History of Present Illness: Leticia Arreguin is a 65 y.o. female who is here today with concern of dizziness and nausea.  She recently developed acute flareup of fibromyalgia so her rheumatologist prescribed Cymbalta which she took for 4 days before discontinuing due to it making chronic tinnitus notably worse.  Once she discontinued Cymbalta she developed nausea and dizziness which have both resolved, however, increased tinnitus has not yet improved.  She has had some fullness in the left ear but denies any ear pain.  No fever, sore throat or sinus pressure.      Subjective      I have reviewed and the following portions of the patient's history were updated as appropriate: past family history, past medical history, past social history, past surgical history and problem list.      Current Outpatient Medications:   •  ASHWAGANDHA PO, Take  by mouth., Disp: , Rfl:   •  cyclobenzaprine (FLEXERIL) 10 MG tablet, Take 1 tablet by mouth every night at bedtime., Disp: 30 tablet, Rfl: 1  •  estradiol (Estring) 2 MG vaginal ring, INSERT ONE RING INTO THE VAGINA EVERY THREE MONTHS., Disp: 1 each, Rfl: 4  •  Fexofenadine HCl (MUCINEX ALLERGY PO), Take  by mouth., Disp: , Rfl:   •  Glucosamine-Chondroitin (OSTEO BI-FLEX REGULAR STRENGTH PO), Take  by mouth., Disp: , Rfl:   •  MAGNESIUM PO, Take  by mouth., Disp: , Rfl:   •  nabumetone (RELAFEN) 500 MG tablet, , Disp: , Rfl:   •  omeprazole (priLOSEC) 40 MG capsule, TAKE 1 CAPSULE BY MOUTH TWICE A DAY (Patient taking differently: Take  by mouth Daily.), Disp: 180 capsule, Rfl: 3  •  pravastatin (Pravachol) 20 MG tablet, Take 1 tablet by mouth Every Night., Disp: 90 tablet, Rfl: 3  •  Probiotic Product (PROBIOTIC-10) capsule, Take  by mouth., Disp: , Rfl:   •   "triamterene-hydrochlorothiazide (MAXZIDE-25) 37.5-25 MG per tablet, Take 1 tablet by mouth Daily., Disp: 90 tablet, Rfl: 3    Allergies   Allergen Reactions   • Sulfa Antibiotics Anaphylaxis   • Sulfamethoxazole-Trimethoprim Anaphylaxis   • Statins Myalgia   • Ciprofloxacin Rash   • Clarithromycin Rash   • Gabapentin Rash   • Nitrofurantoin Nausea Only   • Penicillins Rash   • Risperidone Rash   • Zyprexa [Olanzapine] Irritability     Extreme agitation       Objective     Physical Exam:  Vital Signs:   Vitals:    10/08/21 0835   BP: 128/72   Pulse: 85   Temp: 97.1 °F (36.2 °C)   SpO2: 100%   Weight: 73 kg (161 lb)   Height: 175.3 cm (69.02\")     Body mass index is 23.76 kg/m².    Physical Exam  Vitals and nursing note reviewed.   Constitutional:       General: She is not in acute distress.     Appearance: Normal appearance. She is well-developed. She is not ill-appearing, toxic-appearing or diaphoretic.   HENT:      Head: Normocephalic and atraumatic.      Comments: No sinus tenderness.     Right Ear: Ear canal and external ear normal. There is no impacted cerumen.      Left Ear: Ear canal and external ear normal. There is no impacted cerumen.      Ears:      Comments: Mild bilateral TM effusions, left greater than right.     Mouth/Throat:      Mouth: Mucous membranes are moist.      Pharynx: No oropharyngeal exudate or posterior oropharyngeal erythema.   Eyes:      General: No scleral icterus.        Right eye: No discharge.         Left eye: No discharge.      Extraocular Movements: Extraocular movements intact.      Conjunctiva/sclera: Conjunctivae normal.      Pupils: Pupils are equal, round, and reactive to light.   Cardiovascular:      Rate and Rhythm: Normal rate and regular rhythm.      Heart sounds: Normal heart sounds. No murmur heard.   No friction rub. No gallop.    Pulmonary:      Effort: Pulmonary effort is normal. No respiratory distress.      Breath sounds: Normal breath sounds. No wheezing, rhonchi " or rales.   Chest:      Chest wall: No tenderness.   Abdominal:      General: Bowel sounds are normal.      Palpations: Abdomen is soft.      Tenderness: There is no abdominal tenderness.   Musculoskeletal:         General: No tenderness or deformity. Normal range of motion.      Cervical back: Normal range of motion and neck supple. No rigidity or tenderness.      Right lower leg: No edema.      Left lower leg: No edema.   Lymphadenopathy:      Cervical: No cervical adenopathy.   Skin:     General: Skin is warm and dry.      Capillary Refill: Capillary refill takes less than 2 seconds.      Coloration: Skin is not jaundiced or pale.      Findings: No erythema or rash.   Neurological:      Mental Status: She is alert and oriented to person, place, and time.      Cranial Nerves: No cranial nerve deficit.      Sensory: No sensory deficit.      Motor: No abnormal muscle tone.      Coordination: Coordination normal.      Gait: Gait normal.      Deep Tendon Reflexes: Reflexes normal.   Psychiatric:         Mood and Affect: Mood normal.         Behavior: Behavior normal.         Thought Content: Thought content normal.         Judgment: Judgment normal.         Common labs    Common Labsle 1/28/21 1/28/21 1/28/21 6/24/21 6/24/21    0954 0954 0954 0944 0944   Glucose 89   86    BUN 14   18    Creatinine 0.74   0.84    eGFR Non  Am 79   68    eGFR African Am 95   82    Sodium 138   139    Potassium 4.0   4.4    Chloride 102   102    Calcium 9.4   10.0    Total Protein 6.9   7.1    Albumin 4.40   4.80    Total Bilirubin 0.4   0.3    Alkaline Phosphatase 65   77    AST (SGOT) 20   18    ALT (SGPT) 15   21    WBC   7.20     Hemoglobin   13.8     Hematocrit   40.2     Platelets   337     Total Cholesterol  265 (A)   272 (A)   Triglycerides  68   62   HDL Cholesterol  75 (A)   78 (A)   LDL Cholesterol   179 (A)   185 (A)   (A) Abnormal value       Comments are available for some flowsheets but are not being displayed.                Assessment / Plan      Assessment/Plan:   Diagnoses and all orders for this visit:    1. OME (otitis media with effusion), bilateral (Primary)  Recommended initiation of Flonase daily.  Continue daily antihistamine use.    2. Tinnitus of both ears  Discussed potential causes of tinnitus including certain medications including NSAIDs.  Recommended reducing use of ibuprofen which she states she will consider pain has improved.             Follow Up:   Return if symptoms worsen or fail to improve.    Patient was given instructions and counseling regarding her condition or for health maintenance advice. Please see specific information pulled into the AVS if appropriate.     Linnette Marroquin PA-C  [facility] [address] [facility]     Please note that portions of this note may have been completed with a voice recognition program. Efforts were made to edit the dictations, but occasionally words are mistranscribed.

## 2022-01-06 PROCEDURE — U0004 COV-19 TEST NON-CDC HGH THRU: HCPCS | Performed by: NURSE PRACTITIONER

## 2022-01-25 ENCOUNTER — OFFICE VISIT (OUTPATIENT)
Dept: INTERNAL MEDICINE | Facility: CLINIC | Age: 67
End: 2022-01-25

## 2022-01-25 VITALS
TEMPERATURE: 97 F | SYSTOLIC BLOOD PRESSURE: 124 MMHG | BODY MASS INDEX: 23.25 KG/M2 | HEIGHT: 69 IN | HEART RATE: 92 BPM | OXYGEN SATURATION: 100 % | DIASTOLIC BLOOD PRESSURE: 80 MMHG | WEIGHT: 157 LBS

## 2022-01-25 DIAGNOSIS — I10 BENIGN ESSENTIAL HYPERTENSION: Chronic | ICD-10-CM

## 2022-01-25 DIAGNOSIS — M25.559 HIP PAIN: ICD-10-CM

## 2022-01-25 DIAGNOSIS — J01.40 ACUTE NON-RECURRENT PANSINUSITIS: Primary | ICD-10-CM

## 2022-01-25 DIAGNOSIS — R05.9 COUGH: ICD-10-CM

## 2022-01-25 PROCEDURE — 99214 OFFICE O/P EST MOD 30 MIN: CPT | Performed by: NURSE PRACTITIONER

## 2022-01-25 RX ORDER — DEXTROMETHORPHAN HYDROBROMIDE AND PROMETHAZINE HYDROCHLORIDE 15; 6.25 MG/5ML; MG/5ML
5 SYRUP ORAL 4 TIMES DAILY PRN
Qty: 200 ML | Refills: 0 | Status: SHIPPED | OUTPATIENT
Start: 2022-01-25 | End: 2022-02-04

## 2022-01-25 RX ORDER — DOXYCYCLINE HYCLATE 100 MG/1
100 CAPSULE ORAL 2 TIMES DAILY
Qty: 20 CAPSULE | Refills: 0 | Status: SHIPPED | OUTPATIENT
Start: 2022-01-25 | End: 2022-02-04

## 2022-02-22 ENCOUNTER — OFFICE VISIT (OUTPATIENT)
Dept: INTERNAL MEDICINE | Facility: CLINIC | Age: 67
End: 2022-02-22

## 2022-02-22 VITALS
DIASTOLIC BLOOD PRESSURE: 80 MMHG | HEIGHT: 69 IN | TEMPERATURE: 97.1 F | BODY MASS INDEX: 23.55 KG/M2 | SYSTOLIC BLOOD PRESSURE: 136 MMHG | HEART RATE: 82 BPM | WEIGHT: 159 LBS | OXYGEN SATURATION: 99 %

## 2022-02-22 DIAGNOSIS — J06.9 UPPER RESPIRATORY TRACT INFECTION, UNSPECIFIED TYPE: ICD-10-CM

## 2022-02-22 DIAGNOSIS — I10 BENIGN ESSENTIAL HYPERTENSION: Chronic | ICD-10-CM

## 2022-02-22 DIAGNOSIS — J40 BRONCHITIS: Primary | ICD-10-CM

## 2022-02-22 PROCEDURE — 96372 THER/PROPH/DIAG INJ SC/IM: CPT | Performed by: NURSE PRACTITIONER

## 2022-02-22 PROCEDURE — 99214 OFFICE O/P EST MOD 30 MIN: CPT | Performed by: NURSE PRACTITIONER

## 2022-02-22 RX ORDER — ALBUTEROL SULFATE 90 UG/1
2 AEROSOL, METERED RESPIRATORY (INHALATION) EVERY 4 HOURS PRN
Qty: 18 G | Refills: 0 | Status: SHIPPED | OUTPATIENT
Start: 2022-02-22 | End: 2022-03-16

## 2022-02-22 RX ORDER — DEXTROMETHORPHAN HYDROBROMIDE AND PROMETHAZINE HYDROCHLORIDE 15; 6.25 MG/5ML; MG/5ML
5 SYRUP ORAL 4 TIMES DAILY PRN
Qty: 200 ML | Refills: 0 | Status: SHIPPED | OUTPATIENT
Start: 2022-02-22 | End: 2022-03-04

## 2022-02-22 RX ORDER — PREDNISONE 20 MG/1
20 TABLET ORAL DAILY
Qty: 7 TABLET | Refills: 0 | Status: SHIPPED | OUTPATIENT
Start: 2022-02-22 | End: 2022-03-01

## 2022-02-22 RX ORDER — BENZONATATE 100 MG/1
100 CAPSULE ORAL 3 TIMES DAILY PRN
Qty: 9 CAPSULE | Refills: 0 | Status: SHIPPED | OUTPATIENT
Start: 2022-02-22 | End: 2022-02-25

## 2022-03-16 DIAGNOSIS — J40 BRONCHITIS: ICD-10-CM

## 2022-03-16 RX ORDER — ALBUTEROL SULFATE 90 UG/1
AEROSOL, METERED RESPIRATORY (INHALATION)
Qty: 18 G | Refills: 3 | Status: SHIPPED | OUTPATIENT
Start: 2022-03-16

## 2022-03-25 ENCOUNTER — TELEPHONE (OUTPATIENT)
Dept: INTERNAL MEDICINE | Facility: CLINIC | Age: 67
End: 2022-03-25

## 2022-03-25 NOTE — TELEPHONE ENCOUNTER
Caller: eLticia Arreguin    Relationship: Self    Best call back number: [phone]    What is the best time to reach you: ANYTIME    Who are you requesting to speak with (clinical staff, provider,  specific staff member): PROVIDER     Do you know the name of the person who called: SELF    What was the call regarding: PATIENT STATES THAT SHE WOULD LIKE A CALL ABOUT A MEDICATION CONCERN.    Do you require a callback: YES

## 2022-04-05 ENCOUNTER — OFFICE VISIT (OUTPATIENT)
Dept: INTERNAL MEDICINE | Facility: CLINIC | Age: 67
End: 2022-04-05

## 2022-04-05 VITALS
BODY MASS INDEX: 23.4 KG/M2 | WEIGHT: 158 LBS | SYSTOLIC BLOOD PRESSURE: 140 MMHG | OXYGEN SATURATION: 100 % | TEMPERATURE: 97.3 F | HEART RATE: 91 BPM | HEIGHT: 69 IN | DIASTOLIC BLOOD PRESSURE: 86 MMHG

## 2022-04-05 DIAGNOSIS — F41.9 ANXIETY: ICD-10-CM

## 2022-04-05 DIAGNOSIS — I10 BENIGN ESSENTIAL HYPERTENSION: Chronic | ICD-10-CM

## 2022-04-05 DIAGNOSIS — R53.83 FATIGUE, UNSPECIFIED TYPE: Primary | ICD-10-CM

## 2022-04-05 DIAGNOSIS — R29.898 LEG HEAVINESS: ICD-10-CM

## 2022-04-05 DIAGNOSIS — R45.4 IRRITABILITY: ICD-10-CM

## 2022-04-05 PROCEDURE — 99214 OFFICE O/P EST MOD 30 MIN: CPT | Performed by: NURSE PRACTITIONER

## 2022-04-05 RX ORDER — FLUOXETINE 10 MG/1
TABLET, FILM COATED ORAL
Qty: 21 TABLET | Refills: 0 | Status: SHIPPED | OUTPATIENT
Start: 2022-04-05 | End: 2022-04-06 | Stop reason: SDUPTHER

## 2022-04-05 NOTE — PROGRESS NOTES
Office Visit      Patient Name: Leticia Arreguin  : 1955   MRN: [id number]     Chief Complaint:    Chief Complaint   Patient presents with   • Depression       History of Present Illness: Leticia Arreguin is a 66 y.o. female who is here today for follow up of depression, anxiety.  She has been taking ashwagandha supplements since , initially felt they were working.  Depression and anxiety have both worsened over the past month or longer.  Diagnosed with COVID 2022.  Has been feeling more fatigued since then. Worrying keeps her from enjoying activities she normally enjoys, such as babysitting/childcare.  Irritable, not normal for her.  Denies insomnia, hypersomnia, difficulty concentrating, impaired memory, SI, HI, panic attacks, weight change.  She has been more tired than normal.  Legs feel heavy when walking up stairs.    HTN:  Takes triamterene-HCTZ 37.5-25 mg daily.  Does not monitor blood pressure at home. Denies chest pain, dyspnea, orthopnea, palpitations, lower extremity edema, confusion, headaches, weakness, visual disturbances.    Subjective      I have reviewed and the following portions of the patient's history were updated as appropriate: past family history, past medical history, past social history, past surgical history and problem list.      Current Outpatient Medications:   •  albuterol sulfate  (90 Base) MCG/ACT inhaler, INHALE 2 PUFFS EVERY 4 HOURS AS NEEDED FOR WHEEZING OR SHORTNESS OF AIR, Disp: 18 g, Rfl: 3  •  ASHWAGANDHA PO, Take  by mouth., Disp: , Rfl:   •  cyclobenzaprine (FLEXERIL) 10 MG tablet, Take 1 tablet by mouth every night at bedtime., Disp: 30 tablet, Rfl: 1  •  Fexofenadine HCl (MUCINEX ALLERGY PO), Take  by mouth., Disp: , Rfl:   •  FLUoxetine (PROzac) 10 MG tablet, Take 0.5 tablets by mouth Daily for 14 days, THEN 1 tablet Daily for 14 days., Disp: 21 tablet, Rfl: 0  •  Glucosamine-Chondroitin (OSTEO BI-FLEX REGULAR STRENGTH PO), Take  by  "mouth., Disp: , Rfl:   •  MAGNESIUM PO, Take  by mouth., Disp: , Rfl:   •  omeprazole (priLOSEC) 40 MG capsule, TAKE 1 CAPSULE BY MOUTH TWICE A DAY (Patient taking differently: Take  by mouth Daily.), Disp: 180 capsule, Rfl: 3  •  pravastatin (Pravachol) 20 MG tablet, Take 1 tablet by mouth Every Night., Disp: 90 tablet, Rfl: 3  •  Probiotic Product (PROBIOTIC-10) capsule, Take  by mouth., Disp: , Rfl:   •  triamterene-hydrochlorothiazide (MAXZIDE-25) 37.5-25 MG per tablet, Take 1 tablet by mouth Daily., Disp: 90 tablet, Rfl: 3    Allergies   Allergen Reactions   • Sulfa Antibiotics Anaphylaxis   • Sulfamethoxazole-Trimethoprim Anaphylaxis   • Melatonin Irritability   • Prednisone Rash     \"flaming rash\"   • Statins Myalgia   • Ciprofloxacin Rash   • Clarithromycin Rash   • Gabapentin Rash   • Nitrofurantoin Nausea Only   • Penicillins Rash   • Risperidone Rash   • Zyprexa [Olanzapine] Irritability     Extreme agitation       Objective     Physical Exam:  Vital Signs:   Vitals:    04/05/22 1649   BP: 140/86   Pulse: 91   Temp: 97.3 °F (36.3 °C)   SpO2: 100%   Weight: 71.7 kg (158 lb)   Height: 175.3 cm (69.02\")     Body mass index is 23.32 kg/m².    Physical Exam  Constitutional:       Appearance: She is not ill-appearing.   HENT:      Head: Normocephalic.      Right Ear: External ear normal.      Left Ear: External ear normal.   Eyes:      Conjunctiva/sclera: Conjunctivae normal.      Pupils: Pupils are equal, round, and reactive to light.   Neck:      Thyroid: No thyroid mass, thyromegaly or thyroid tenderness.   Cardiovascular:      Rate and Rhythm: Normal rate and regular rhythm.      Pulses:           Radial pulses are 2+ on the right side and 2+ on the left side.        Dorsalis pedis pulses are 2+ on the right side and 2+ on the left side.      Heart sounds: Normal heart sounds.   Pulmonary:      Effort: Pulmonary effort is normal.      Breath sounds: Normal breath sounds.   Musculoskeletal:      Cervical " back: Normal range of motion and neck supple.      Right lower leg: No edema.      Left lower leg: No edema.   Skin:     General: Skin is warm.      Capillary Refill: Capillary refill takes less than 2 seconds.   Neurological:      Mental Status: She is alert and oriented to person, place, and time.      Coordination: Coordination normal.      Gait: Gait normal.   Psychiatric:         Mood and Affect: Mood normal.         Behavior: Behavior normal.         Thought Content: Thought content normal.       Assessment / Plan      Assessment/Plan:   Diagnoses and all orders for this visit:    1. Fatigue, unspecified type (Primary)  -     TSH  -     Thyroid Peroxidase Antibody  -     T4, Free  -     Vitamin B12 and Folate  -     CBC and Differential    2. Leg heaviness  -     TSH  -     Thyroid Peroxidase Antibody  -     T4, Free    3. Anxiety  -     FLUoxetine (PROzac) 10 MG tablet; Take 0.5 tablets by mouth Daily for 14 days, THEN 1 tablet Daily for 14 days.  Dispense: 21 tablet; Refill: 0        - Encouraged to take part in daily physical exercise.          - Eat healthy, well balanced diet; avoid sugary foods or beverages        - Ensure good night's sleep by creating calm space in bedroom, avoiding screen time 1-2 hours before bed, no caffeine after 5 pm        - Talk to supportive family and friends, as needed        - Consider journaling, other creative way to express feelings, if needed    4. Irritability  -     FLUoxetine (PROzac) 10 MG tablet; Take 0.5 tablets by mouth Daily for 14 days, THEN 1 tablet Daily for 14 days.  Dispense: 21 tablet; Refill: 0  - Daily physical activity    5.  Hypertension         - Follow heart healthy diet.  Keep sodium intake < 1500 mg per day.  Avoid processed & fast foods.          - Exercise as tolerated, with a goal of 30 minutes of moderate exercise most days.         - Take medications as prescribed.      Follow Up:   Return in about 4 weeks (around 5/3/2022) for Next scheduled  follow up.    Patient was given instructions and counseling regarding her condition or for health maintenance advice. Please see specific information pulled into the AVS if appropriate.       [facility] [address] [facility]     Please note that portions of this note may have been completed with a voice recognition program. Efforts were made to edit dictation, but occasionally words are mistranscribed.

## 2022-04-06 RX ORDER — FLUOXETINE 10 MG/1
TABLET, FILM COATED ORAL
Qty: 21 TABLET | Refills: 0 | Status: SHIPPED | OUTPATIENT
Start: 2022-04-06 | End: 2022-05-02 | Stop reason: SDUPTHER

## 2022-04-07 LAB
BASOPHILS # BLD AUTO: 0.07 10*3/MM3 (ref 0–0.2)
BASOPHILS NFR BLD AUTO: 1.2 % (ref 0–1.5)
EOSINOPHIL # BLD AUTO: 0.12 10*3/MM3 (ref 0–0.4)
EOSINOPHIL NFR BLD AUTO: 2.1 % (ref 0.3–6.2)
ERYTHROCYTE [DISTWIDTH] IN BLOOD BY AUTOMATED COUNT: 12.4 % (ref 12.3–15.4)
FOLATE SERPL-MCNC: >20 NG/ML (ref 4.78–24.2)
HCT VFR BLD AUTO: 42.8 % (ref 34–46.6)
HGB BLD-MCNC: 14 G/DL (ref 12–15.9)
IMM GRANULOCYTES # BLD AUTO: 0.02 10*3/MM3 (ref 0–0.05)
IMM GRANULOCYTES NFR BLD AUTO: 0.4 % (ref 0–0.5)
LYMPHOCYTES # BLD AUTO: 1.97 10*3/MM3 (ref 0.7–3.1)
LYMPHOCYTES NFR BLD AUTO: 34.6 % (ref 19.6–45.3)
MCH RBC QN AUTO: 30.3 PG (ref 26.6–33)
MCHC RBC AUTO-ENTMCNC: 32.7 G/DL (ref 31.5–35.7)
MCV RBC AUTO: 92.6 FL (ref 79–97)
MONOCYTES # BLD AUTO: 0.63 10*3/MM3 (ref 0.1–0.9)
MONOCYTES NFR BLD AUTO: 11.1 % (ref 5–12)
NEUTROPHILS # BLD AUTO: 2.88 10*3/MM3 (ref 1.7–7)
NEUTROPHILS NFR BLD AUTO: 50.6 % (ref 42.7–76)
NRBC BLD AUTO-RTO: 0 /100 WBC (ref 0–0.2)
PLATELET # BLD AUTO: 367 10*3/MM3 (ref 140–450)
RBC # BLD AUTO: 4.62 10*6/MM3 (ref 3.77–5.28)
T4 FREE SERPL-MCNC: 1.25 NG/DL (ref 0.93–1.7)
THYROPEROXIDASE AB SERPL-ACNC: <8 IU/ML (ref 0–34)
TSH SERPL DL<=0.005 MIU/L-ACNC: 2.05 UIU/ML (ref 0.27–4.2)
VIT B12 SERPL-MCNC: 650 PG/ML (ref 211–946)
WBC # BLD AUTO: 5.69 10*3/MM3 (ref 3.4–10.8)

## 2022-04-08 NOTE — PROGRESS NOTES
Please inform patient labs are normal. Fatigue may be r/t depression, fairly recent COVID.  Eat nourishing foods, stay well hydrated.  Rest when needed.  If fatigue associated with palpitations, chest pain, SOA, nausea, sweating, weight loss either return to clinic or go to ED (if severe).

## 2022-05-02 DIAGNOSIS — R45.4 IRRITABILITY: ICD-10-CM

## 2022-05-02 DIAGNOSIS — F41.9 ANXIETY: ICD-10-CM

## 2022-05-02 RX ORDER — FLUOXETINE 10 MG/1
TABLET, FILM COATED ORAL
Qty: 21 TABLET | Refills: 0 | Status: SHIPPED | OUTPATIENT
Start: 2022-05-02 | End: 2022-05-09 | Stop reason: DRUGHIGH

## 2022-05-02 RX ORDER — FLUOXETINE 10 MG/1
TABLET, FILM COATED ORAL
Qty: 21 TABLET | Refills: 0 | Status: SHIPPED | OUTPATIENT
Start: 2022-05-02 | End: 2022-05-02 | Stop reason: SDUPTHER

## 2022-05-02 NOTE — TELEPHONE ENCOUNTER
Caller: Leticia Arreguin KAMI    Relationship: Self    Best call back number: [phone]    Requested Prescriptions:   Requested Prescriptions     Pending Prescriptions Disp Refills   • FLUoxetine (PROzac) 10 MG tablet 21 tablet 0     Sig: Take 0.5 tablets by mouth Daily for 14 days, THEN 1 tablet Daily for 14 days.        Pharmacy where request should be sent: [facility] [address] [place] [address] [phone] [facility] [phone] FX     Additional details provided by patient: patient has 1 day left.    Patient stated that insurance doesn't cover it but it is on 4 dollar list at [facility]    Does the patient have less than a 3 day supply:  [x] Yes  [] No    Elysia Shaikh Rep   05/02/22 11:24 EDT

## 2022-05-02 NOTE — TELEPHONE ENCOUNTER
Caller: Leticia Arreguin    Relationship: Self    Best call back number: [phone]     Requested Prescriptions:   Requested Prescriptions     Pending Prescriptions Disp Refills   • FLUoxetine (PROzac) 10 MG tablet 21 tablet 0     Sig: Take 0.5 tablets by mouth Daily for 14 days, THEN 1 tablet Daily for 14 days.        Pharmacy where request should be sent: [facility] #[id number] - [place], [place] - [address] [facility] [phone] [facility] [phone] FX     Additional details provided by patient: PATIENT IS REQUESTING A REFILL ON ENOUGH MEDICATION TO GET HER THROUGH UNTIL NEXT APPOINTMENT ON 5/9/22. PATIENT MISTAKENLY STARTED TAKING 10 MG OF MEDICATION A WEEK SOONER THAN SHE WAS SUPPOSE TO AND HAS RUN OUT.    Does the patient have less than a 3 day supply:  [x] Yes  [] No    Vanessa Martinez   05/02/22 10:37 EDT

## 2022-05-02 NOTE — TELEPHONE ENCOUNTER
Rx Refill Note  Requested Prescriptions     Pending Prescriptions Disp Refills   • FLUoxetine (PROzac) 10 MG tablet 21 tablet 0     Sig: Take 0.5 tablets by mouth Daily for 14 days, THEN 1 tablet Daily for 14 days.      Last office visit with prescribing clinician: 4/5/2022      Next office visit with prescribing clinician: 5/9/2022            Rashmi Galindo LPN  05/02/22, 10:57 EDT

## 2022-05-09 ENCOUNTER — OFFICE VISIT (OUTPATIENT)
Dept: INTERNAL MEDICINE | Facility: CLINIC | Age: 67
End: 2022-05-09

## 2022-05-09 VITALS
TEMPERATURE: 97.4 F | HEIGHT: 69 IN | WEIGHT: 158 LBS | HEART RATE: 78 BPM | DIASTOLIC BLOOD PRESSURE: 82 MMHG | BODY MASS INDEX: 23.4 KG/M2 | OXYGEN SATURATION: 98 % | SYSTOLIC BLOOD PRESSURE: 132 MMHG

## 2022-05-09 DIAGNOSIS — H93.13 TINNITUS OF BOTH EARS: ICD-10-CM

## 2022-05-09 DIAGNOSIS — H91.93 BILATERAL HEARING LOSS, UNSPECIFIED HEARING LOSS TYPE: ICD-10-CM

## 2022-05-09 DIAGNOSIS — F41.9 ANXIETY: Primary | ICD-10-CM

## 2022-05-09 DIAGNOSIS — R53.83 FATIGUE, UNSPECIFIED TYPE: ICD-10-CM

## 2022-05-09 PROCEDURE — 99214 OFFICE O/P EST MOD 30 MIN: CPT | Performed by: NURSE PRACTITIONER

## 2022-05-09 RX ORDER — FLUOXETINE 10 MG/1
5 TABLET, FILM COATED ORAL 2 TIMES DAILY
Qty: 90 TABLET | Refills: 3 | Status: SHIPPED | OUTPATIENT
Start: 2022-05-09

## 2022-05-09 RX ORDER — ETODOLAC 400 MG/1
400 TABLET, FILM COATED ORAL 2 TIMES DAILY WITH MEALS
COMMUNITY
Start: 2022-04-29

## 2022-05-09 NOTE — PROGRESS NOTES
"     Office Visit      Patient Name: Leticia Arreguin  : 1955   MRN: [id number]     Chief Complaint:    Chief Complaint   Patient presents with   • Anxiety       History of Present Illness: Leticia Arreguin is a 66 y.o. female who is here today for follow up of anxiety.  Has been taking fluoxetine 10 mg since 22.  She has been taking 5 mg in the morning, 5 mg at night.  Wasn't working as well taking 10 mg at once.  Denies depressed mood, anhedonia, insomnia, hypersomnia, fatigue, feelings of worthlessness, difficulty concentrating, impaired memory, SI, HI, panic attacks, weight change.    Continues to feel fatigued, exhausted.  Not associated with emotions.  Started taking a MVN, with B vitamins. Has not noticed a great difference in energy, fatigue yet. Feels it related to having COVID in . CBC with differential, vitamin B12 and folate, TSH, thyroid peroxidase antibody, free T4 normal in April.  Denies chest pain, palpitations, lower extremity swelling, inappropriate sweating, nausea, vomiting, early satiety, change in weight, change in bowel habit.    Has been unable to keep up with conversations with background noises.  Has seen ENT, hearing test was \"bad\".  Buzzing in her head in the morning, unable to differentiate buzzing from hearing test tones. Unable to afford hearing aids, if insurance does not cover part of the cost.     Subjective      I have reviewed and the following portions of the patient's history were updated as appropriate: past family history, past medical history, past social history, past surgical history and problem list.      Current Outpatient Medications:   •  albuterol sulfate  (90 Base) MCG/ACT inhaler, INHALE 2 PUFFS EVERY 4 HOURS AS NEEDED FOR WHEEZING OR SHORTNESS OF AIR, Disp: 18 g, Rfl: 3  •  ASHWAGANDHA PO, Take  by mouth., Disp: , Rfl:   •  cyclobenzaprine (FLEXERIL) 10 MG tablet, Take 1 tablet by mouth every night at bedtime., Disp: 30 tablet, Rfl: " "1  •  etodolac (LODINE) 400 MG tablet, Take 400 mg by mouth 2 (Two) Times a Day With Meals., Disp: , Rfl:   •  Fexofenadine HCl (MUCINEX ALLERGY PO), Take  by mouth., Disp: , Rfl:   •  FLUoxetine (PROzac) 10 MG tablet, Take 0.5 tablets by mouth 2 (Two) Times a Day., Disp: 90 tablet, Rfl: 3  •  Glucosamine-Chondroitin (OSTEO BI-FLEX REGULAR STRENGTH PO), Take  by mouth., Disp: , Rfl:   •  MAGNESIUM PO, Take  by mouth., Disp: , Rfl:   •  omeprazole (priLOSEC) 40 MG capsule, TAKE 1 CAPSULE BY MOUTH TWICE A DAY (Patient taking differently: Take  by mouth Daily.), Disp: 180 capsule, Rfl: 3  •  pravastatin (Pravachol) 20 MG tablet, Take 1 tablet by mouth Every Night., Disp: 90 tablet, Rfl: 3  •  Probiotic Product (PROBIOTIC-10) capsule, Take  by mouth., Disp: , Rfl:   •  triamterene-hydrochlorothiazide (MAXZIDE-25) 37.5-25 MG per tablet, Take 1 tablet by mouth Daily., Disp: 90 tablet, Rfl: 3    Allergies   Allergen Reactions   • Sulfa Antibiotics Anaphylaxis   • Sulfamethoxazole-Trimethoprim Anaphylaxis   • Melatonin Irritability   • Prednisone Rash     \"flaming rash\"   • Statins Myalgia   • Ciprofloxacin Rash   • Clarithromycin Rash   • Gabapentin Rash   • Nitrofurantoin Nausea Only   • Penicillins Rash   • Risperidone Rash   • Zyprexa [Olanzapine] Irritability     Extreme agitation       Objective     Physical Exam:  Vital Signs:   Vitals:    05/09/22 0930   BP: 132/82   Pulse: 78   Temp: 97.4 °F (36.3 °C)   SpO2: 98%   Weight: 71.7 kg (158 lb)   Height: 175.3 cm (69.02\")     Body mass index is 23.32 kg/m².    Physical Exam  Constitutional:       Appearance: She is not ill-appearing.   HENT:      Head: Normocephalic.      Right Ear: Tympanic membrane, ear canal and external ear normal. No decreased hearing noted.      Left Ear: Tympanic membrane, ear canal and external ear normal. No decreased hearing noted.   Eyes:      Conjunctiva/sclera: Conjunctivae normal.      Pupils: Pupils are equal, round, and reactive to " light.   Cardiovascular:      Rate and Rhythm: Normal rate and regular rhythm.      Pulses:           Radial pulses are 2+ on the right side and 2+ on the left side.        Dorsalis pedis pulses are 2+ on the right side and 2+ on the left side.      Heart sounds: Normal heart sounds.   Pulmonary:      Effort: Pulmonary effort is normal.      Breath sounds: Normal breath sounds.   Musculoskeletal:      Cervical back: Normal range of motion and neck supple.      Right lower leg: No edema.      Left lower leg: No edema.   Skin:     General: Skin is warm.      Capillary Refill: Capillary refill takes less than 2 seconds.   Neurological:      Mental Status: She is alert and oriented to person, place, and time.      Coordination: Coordination normal.      Gait: Gait normal.   Psychiatric:         Mood and Affect: Mood normal.         Behavior: Behavior normal.         Thought Content: Thought content normal.             Assessment / Plan      Assessment/Plan:   Diagnoses and all orders for this visit:    1. Anxiety (Primary)  -     FLUoxetine (PROzac) 10 MG tablet; Take 0.5 tablets by mouth 2 (Two) Times a Day.  Dispense: 90 tablet; Refill: 3        - Encouraged to take part in daily physical exercise.          - Eat healthy, well balanced diet; avoid sugary foods or beverages        - Limit alcohol intake        - Ensure good night's sleep by creating calm space in bedroom, avoiding screen time 1-2 hours before bed, no caffeine after 5 pm        - Talk to supportive family and friends, as needed        - Consider journaling, other creative way to express feelings, if needed    2. Bilateral hearing loss, unspecified hearing loss type  -     Ambulatory Referral to Audiology    3. Tinnitus of both ears  -     Ambulatory Referral to Audiology    4. Fatigue, unspecified type        - Continue to monitor.  Discussed Covid Long Hauler syndrome.  She will continue to eat nourishing diet, stay well-hydrated, take vitamin  supplement.  Will return to clinic should symptoms fail to improve or worsen.           Follow Up:   Return if symptoms worsen or fail to improve.    Patient was given instructions and counseling regarding her condition or for health maintenance advice. Please see specific information pulled into the AVS if appropriate.       [facility] [address] [facility]     Please note that portions of this note may have been completed with a voice recognition program. Efforts were made to edit dictation, but occasionally words are mistranscribed.

## 2022-06-22 ENCOUNTER — TELEPHONE (OUTPATIENT)
Dept: INTERNAL MEDICINE | Facility: CLINIC | Age: 67
End: 2022-06-22

## 2022-06-22 DIAGNOSIS — I10 BENIGN ESSENTIAL HYPERTENSION: ICD-10-CM

## 2022-06-22 DIAGNOSIS — R60.0 BILATERAL LEG EDEMA: ICD-10-CM

## 2022-06-22 RX ORDER — TRIAMTERENE AND HYDROCHLOROTHIAZIDE 37.5; 25 MG/1; MG/1
1 TABLET ORAL DAILY
Qty: 90 TABLET | Refills: 3 | Status: SHIPPED | OUTPATIENT
Start: 2022-06-22

## 2022-06-22 NOTE — TELEPHONE ENCOUNTER
Caller: Kallie Leticia KAMI    Relationship: Self    Best call back number: [phone]    Requested Prescriptions:   Requested Prescriptions     Pending Prescriptions Disp Refills   • triamterene-hydrochlorothiazide (MAXZIDE-25) 37.5-25 MG per tablet 90 tablet 3     Sig: Take 1 tablet by mouth Daily.        Pharmacy where request should be sent: [facility]/PHARMACY #[id number] - [address] [phone] [facility] [phone] FX     PATIENT STATES [facility] HAS BEEN TRYING TO REACH DR GARCIA ABOUT REFILLING THIS MEDICATION FOR A WEEK.     Does the patient have less than a 3 day supply:  [] Yes  [x] No    Elysia Camacho Rep   06/22/22 09:26 EDT

## 2022-10-18 ENCOUNTER — OFFICE VISIT (OUTPATIENT)
Dept: INTERNAL MEDICINE | Facility: CLINIC | Age: 67
End: 2022-10-18

## 2022-10-18 VITALS
SYSTOLIC BLOOD PRESSURE: 142 MMHG | BODY MASS INDEX: 22.66 KG/M2 | OXYGEN SATURATION: 98 % | TEMPERATURE: 97.2 F | DIASTOLIC BLOOD PRESSURE: 82 MMHG | WEIGHT: 153 LBS | HEART RATE: 80 BPM | HEIGHT: 69 IN

## 2022-10-18 DIAGNOSIS — F41.9 ANXIETY: Primary | ICD-10-CM

## 2022-10-18 DIAGNOSIS — J45.20 MILD INTERMITTENT ASTHMA WITHOUT COMPLICATION: ICD-10-CM

## 2022-10-18 DIAGNOSIS — R53.83 OTHER FATIGUE: ICD-10-CM

## 2022-10-18 DIAGNOSIS — E78.5 DYSLIPIDEMIA: ICD-10-CM

## 2022-10-18 DIAGNOSIS — I10 BENIGN ESSENTIAL HYPERTENSION: ICD-10-CM

## 2022-10-18 DIAGNOSIS — R29.898 LEG HEAVINESS: ICD-10-CM

## 2022-10-18 DIAGNOSIS — Z78.0 POSTMENOPAUSAL: ICD-10-CM

## 2022-10-18 DIAGNOSIS — Z83.3 FHX: DIABETES MELLITUS: ICD-10-CM

## 2022-10-18 DIAGNOSIS — Z86.16 PERSONAL HISTORY OF COVID-19: ICD-10-CM

## 2022-10-18 PROCEDURE — 99214 OFFICE O/P EST MOD 30 MIN: CPT | Performed by: NURSE PRACTITIONER

## 2022-10-18 NOTE — PROGRESS NOTES
Office Visit      Patient Name: Leticia Arreguin  : 1955   MRN: [id number]     Chief Complaint:    Chief Complaint   Patient presents with   • Anxiety       History of Present Illness: Leticia Arreguin is a 66 y.o. female who is here today with anxiety.  She is currently taking fluoxetine 10 mg daily as well as an ashwagandha supplement. Denies anhedonia, difficulty concentrating, impaired memory, SI, HI, panic attacks, weight change.     HTN: taking triamterene-hctz 37.5-25 mg daily as prescribed.  Does not usually monitor her blood pressure at home.  Endorses heavy feeling in her legs and fatigue.  Denies chest pain, dyspnea, orthopnea, palpitations, lower extremity edema, confusion, headaches, weakness, visual disturbances.     She is concerned about her oxygen saturations, occasionally 94% when it is usually > 97% since having COVID earlier this year.  She does have mild, intermittent asthma.  Uses albuterol inhaler when needed for SOA, wheezing.    Subjective      I have reviewed and the following portions of the patient's history were updated as appropriate: past family history, past medical history, past social history, past surgical history and problem list.      Current Outpatient Medications:   •  albuterol sulfate  (90 Base) MCG/ACT inhaler, INHALE 2 PUFFS EVERY 4 HOURS AS NEEDED FOR WHEEZING OR SHORTNESS OF AIR, Disp: 18 g, Rfl: 3  •  ASHWAGANDHA PO, Take  by mouth., Disp: , Rfl:   •  cyclobenzaprine (FLEXERIL) 10 MG tablet, Take 1 tablet by mouth every night at bedtime., Disp: 30 tablet, Rfl: 1  •  etodolac (LODINE) 400 MG tablet, Take 400 mg by mouth 2 (Two) Times a Day With Meals., Disp: , Rfl:   •  Fexofenadine HCl (MUCINEX ALLERGY PO), Take  by mouth., Disp: , Rfl:   •  FLUoxetine (PROzac) 10 MG tablet, Take 0.5 tablets by mouth 2 (Two) Times a Day., Disp: 90 tablet, Rfl: 3  •  Glucosamine-Chondroitin (OSTEO BI-FLEX REGULAR STRENGTH PO), Take  by mouth., Disp: , Rfl:   •  MAGNESIUM  "PO, Take  by mouth., Disp: , Rfl:   •  omeprazole (priLOSEC) 40 MG capsule, TAKE 1 CAPSULE BY MOUTH TWICE A DAY (Patient taking differently: Take  by mouth Daily.), Disp: 180 capsule, Rfl: 3  •  pravastatin (Pravachol) 20 MG tablet, Take 1 tablet by mouth Every Night., Disp: 90 tablet, Rfl: 3  •  Probiotic Product (PROBIOTIC-10) capsule, Take  by mouth., Disp: , Rfl:   •  triamterene-hydrochlorothiazide (MAXZIDE-25) 37.5-25 MG per tablet, Take 1 tablet by mouth Daily., Disp: 90 tablet, Rfl: 3    Allergies   Allergen Reactions   • Sulfa Antibiotics Anaphylaxis   • Sulfamethoxazole-Trimethoprim Anaphylaxis   • Melatonin Irritability   • Prednisone Rash     \"flaming rash\"   • Statins Myalgia   • Ciprofloxacin Rash   • Clarithromycin Rash   • Gabapentin Rash   • Nitrofurantoin Nausea Only   • Penicillins Rash   • Risperidone Rash   • Zyprexa [Olanzapine] Irritability     Extreme agitation       Objective     Physical Exam:  Vital Signs:   Vitals:    10/18/22 1641   BP: 142/82   Pulse: 80   Temp: 97.2 °F (36.2 °C)   SpO2: 98%   Weight: 69.4 kg (153 lb)   Height: 175.3 cm (69.02\")     Body mass index is 22.58 kg/m².    Physical Exam  Constitutional:       Appearance: She is not ill-appearing.   HENT:      Head: Normocephalic.      Right Ear: External ear normal.      Left Ear: External ear normal.   Eyes:      Conjunctiva/sclera: Conjunctivae normal.      Pupils: Pupils are equal, round, and reactive to light.   Cardiovascular:      Rate and Rhythm: Normal rate and regular rhythm.      Pulses:           Radial pulses are 2+ on the right side and 2+ on the left side.        Dorsalis pedis pulses are 2+ on the right side and 2+ on the left side.      Heart sounds: Normal heart sounds.   Pulmonary:      Effort: Pulmonary effort is normal.      Breath sounds: Normal breath sounds.   Musculoskeletal:      Cervical back: Normal range of motion and neck supple.      Right lower leg: No edema.      Left lower leg: No edema. "   Skin:     General: Skin is warm.      Capillary Refill: Capillary refill takes less than 2 seconds.   Neurological:      Mental Status: She is alert and oriented to person, place, and time.      Coordination: Coordination normal.      Gait: Gait normal.   Psychiatric:         Mood and Affect: Mood normal.         Behavior: Behavior normal.         Thought Content: Thought content normal.             Assessment / Plan      Assessment/Plan:   Diagnoses and all orders for this visit:    1. Anxiety (Primary)        - Encouraged to take part in daily physical exercise.          - Eat healthy, well balanced diet; avoid sugary foods or beverages        - Continue to abstain from alcohol and drugs        - Ensure good night's sleep by creating calm space in bedroom, avoiding screen time 1-2 hours before bed, no caffeine after 5 pm        - Talk to supportive family and friends, as needed        - Consider journaling, other creative way to express feelings, if needed        - Continue Prozac, ashwagandha     2. Benign essential hypertension  -     Comprehensive Metabolic Panel  -     Magnesium        - Continue to follow heart healthy diet.  Keep sodium intake < 1500 mg per day.  Avoid processed & fast foods.          - Continue to exercise as tolerated, with a goal of 30 minutes of moderate exercise most days.         - Take medications as prescribed.    3. Dyslipidemia  -     Lipid Panel  - Continue heart healthy diet, daily physical activity    4. Mild intermittent asthma without complication  -     XR chest pa and lateral  -     Full Pulmonary Function Test With Bronchodilator; Future  - Use albuterol inhaler as needed wheezing, shortness of breath.  May use prior to physical activity if needed.    5. Postmenopausal  -     FSH & LH  -     Testosterone  -     Estradiol    6. Other fatigue  -     CBC & Differential  -     Vitamin B12  -     TSH  -     T4, Free    7. Leg heaviness  -     Magnesium  -     TSH  -     T4,  Free    8. FHx: diabetes mellitus  -     Hemoglobin A1c    9. Personal history of COVID-19  -     XR chest pa and lateral  -     Full Pulmonary Function Test With Bronchodilator; Future             Follow Up:   Return if symptoms worsen or fail to improve.    Patient was given instructions and counseling regarding her condition or for health maintenance advice. Please see specific information pulled into the AVS if appropriate.       [facility] [address] [facility]     Please note that portions of this note may have been completed with a voice recognition program. Efforts were made to edit dictation, but occasionally words are mistranscribed.

## 2022-10-22 LAB
ALBUMIN SERPL-MCNC: 4.3 G/DL (ref 3.5–5.2)
ALBUMIN/GLOB SERPL: 2 G/DL
ALP SERPL-CCNC: 80 U/L (ref 39–117)
ALT SERPL-CCNC: 32 U/L (ref 1–33)
AST SERPL-CCNC: 26 U/L (ref 1–32)
BASOPHILS # BLD AUTO: 0.08 10*3/MM3 (ref 0–0.2)
BASOPHILS NFR BLD AUTO: 1.3 % (ref 0–1.5)
BILIRUB SERPL-MCNC: 0.4 MG/DL (ref 0–1.2)
BUN SERPL-MCNC: 16 MG/DL (ref 8–23)
BUN/CREAT SERPL: 19.5 (ref 7–25)
CALCIUM SERPL-MCNC: 9.1 MG/DL (ref 8.6–10.5)
CHLORIDE SERPL-SCNC: 99 MMOL/L (ref 98–107)
CHOLEST SERPL-MCNC: 252 MG/DL (ref 0–200)
CO2 SERPL-SCNC: 28.1 MMOL/L (ref 22–29)
CREAT SERPL-MCNC: 0.82 MG/DL (ref 0.57–1)
EGFRCR SERPLBLD CKD-EPI 2021: 79 ML/MIN/1.73
EOSINOPHIL # BLD AUTO: 0.13 10*3/MM3 (ref 0–0.4)
EOSINOPHIL NFR BLD AUTO: 2.1 % (ref 0.3–6.2)
ERYTHROCYTE [DISTWIDTH] IN BLOOD BY AUTOMATED COUNT: 12 % (ref 12.3–15.4)
ESTRADIOL SERPL-MCNC: 11.6 PG/ML
FSH SERPL-ACNC: 83.8 MIU/ML
GLOBULIN SER CALC-MCNC: 2.1 GM/DL
GLUCOSE SERPL-MCNC: 88 MG/DL (ref 65–99)
HBA1C MFR BLD: 5.5 % (ref 4.8–5.6)
HCT VFR BLD AUTO: 39.3 % (ref 34–46.6)
HDLC SERPL-MCNC: 76 MG/DL (ref 40–60)
HGB BLD-MCNC: 13.4 G/DL (ref 12–15.9)
IMM GRANULOCYTES # BLD AUTO: 0.02 10*3/MM3 (ref 0–0.05)
IMM GRANULOCYTES NFR BLD AUTO: 0.3 % (ref 0–0.5)
LDLC SERPL CALC-MCNC: 165 MG/DL (ref 0–100)
LH SERPL-ACNC: 30.9 MIU/ML
LYMPHOCYTES # BLD AUTO: 1.82 10*3/MM3 (ref 0.7–3.1)
LYMPHOCYTES NFR BLD AUTO: 29.8 % (ref 19.6–45.3)
MAGNESIUM SERPL-MCNC: 2.5 MG/DL (ref 1.6–2.4)
MCH RBC QN AUTO: 30.8 PG (ref 26.6–33)
MCHC RBC AUTO-ENTMCNC: 34.1 G/DL (ref 31.5–35.7)
MCV RBC AUTO: 90.3 FL (ref 79–97)
MONOCYTES # BLD AUTO: 0.65 10*3/MM3 (ref 0.1–0.9)
MONOCYTES NFR BLD AUTO: 10.7 % (ref 5–12)
NEUTROPHILS # BLD AUTO: 3.4 10*3/MM3 (ref 1.7–7)
NEUTROPHILS NFR BLD AUTO: 55.8 % (ref 42.7–76)
NRBC BLD AUTO-RTO: 0 /100 WBC (ref 0–0.2)
PLATELET # BLD AUTO: 361 10*3/MM3 (ref 140–450)
POTASSIUM SERPL-SCNC: 4.6 MMOL/L (ref 3.5–5.2)
PROT SERPL-MCNC: 6.4 G/DL (ref 6–8.5)
RBC # BLD AUTO: 4.35 10*6/MM3 (ref 3.77–5.28)
SODIUM SERPL-SCNC: 138 MMOL/L (ref 136–145)
T4 FREE SERPL-MCNC: 1.24 NG/DL (ref 0.93–1.7)
TESTOST SERPL-MCNC: <3 NG/DL (ref 3–67)
TRIGL SERPL-MCNC: 65 MG/DL (ref 0–150)
TSH SERPL DL<=0.005 MIU/L-ACNC: 1.72 UIU/ML (ref 0.27–4.2)
VIT B12 SERPL-MCNC: 1137 PG/ML (ref 211–946)
VLDLC SERPL CALC-MCNC: 11 MG/DL (ref 5–40)
WBC # BLD AUTO: 6.1 10*3/MM3 (ref 3.4–10.8)

## 2022-10-28 DIAGNOSIS — Z11.52 ENCOUNTER FOR SCREENING FOR COVID-19: Primary | ICD-10-CM

## 2022-11-14 ENCOUNTER — HOSPITAL ENCOUNTER (OUTPATIENT)
Dept: PULMONOLOGY | Facility: HOSPITAL | Age: 67
Discharge: HOME | End: 2022-11-14
Admitting: NURSE PRACTITIONER

## 2022-11-14 DIAGNOSIS — J45.20 MILD INTERMITTENT ASTHMA WITHOUT COMPLICATION: ICD-10-CM

## 2022-11-14 DIAGNOSIS — Z86.16 PERSONAL HISTORY OF COVID-19: ICD-10-CM

## 2022-11-14 PROCEDURE — 94010 BREATHING CAPACITY TEST: CPT

## 2022-11-14 PROCEDURE — 94010 BREATHING CAPACITY TEST: CPT | Performed by: INTERNAL MEDICINE

## 2022-11-14 PROCEDURE — 94729 DIFFUSING CAPACITY: CPT

## 2022-11-14 PROCEDURE — 94726 PLETHYSMOGRAPHY LUNG VOLUMES: CPT | Performed by: INTERNAL MEDICINE

## 2022-11-14 PROCEDURE — 94729 DIFFUSING CAPACITY: CPT | Performed by: INTERNAL MEDICINE

## 2022-11-14 PROCEDURE — 94726 PLETHYSMOGRAPHY LUNG VOLUMES: CPT

## 2022-12-23 ENCOUNTER — TELEPHONE (OUTPATIENT)
Dept: INTERNAL MEDICINE | Facility: CLINIC | Age: 67
End: 2022-12-23

## 2022-12-23 ENCOUNTER — TELEMEDICINE (OUTPATIENT)
Dept: INTERNAL MEDICINE | Facility: CLINIC | Age: 67
End: 2022-12-23

## 2022-12-23 DIAGNOSIS — Z20.818 STREP THROAT EXPOSURE: Primary | ICD-10-CM

## 2022-12-23 DIAGNOSIS — T36.95XA ANTIBIOTIC-INDUCED YEAST INFECTION: ICD-10-CM

## 2022-12-23 DIAGNOSIS — B37.9 ANTIBIOTIC-INDUCED YEAST INFECTION: ICD-10-CM

## 2022-12-23 PROCEDURE — 99213 OFFICE O/P EST LOW 20 MIN: CPT | Performed by: NURSE PRACTITIONER

## 2022-12-23 RX ORDER — AZELASTINE HYDROCHLORIDE 137 UG/1
SPRAY, METERED NASAL
COMMUNITY
Start: 2022-11-23

## 2022-12-23 RX ORDER — FLUCONAZOLE 150 MG/1
150 TABLET ORAL
Qty: 2 TABLET | Refills: 0 | Status: SHIPPED | OUTPATIENT
Start: 2022-12-23 | End: 2022-12-29

## 2022-12-23 RX ORDER — AMOXICILLIN AND CLAVULANATE POTASSIUM 875; 125 MG/1; MG/1
1 TABLET, FILM COATED ORAL 2 TIMES DAILY
Qty: 20 TABLET | Refills: 0 | Status: SHIPPED | OUTPATIENT
Start: 2022-12-23 | End: 2023-01-02

## 2022-12-23 RX ORDER — FLUTICASONE PROPIONATE 50 MCG
SPRAY, SUSPENSION (ML) NASAL
COMMUNITY
Start: 2022-11-23

## 2022-12-23 NOTE — TELEPHONE ENCOUNTER
Caller: Leticia Arreguin    Relationship: Self    Best call back number: [phone]    What medication are you requesting: ANTIBIOTIC REQUEST    What are your current symptoms: SORE THROAT; BLISTERS IN THROAT    How long have you been experiencing symptoms: LAST NIGHT    Have you had these symptoms before:    [x] Yes  [] No    Have you been treated for these symptoms before:   [x] Yes  [] No    If a prescription is needed, what is your preferred pharmacy and phone number: [facility]/PHARMACY #[id number] - [address] [phone] [facility] [phone]      Additional notes: PATIENT STATED THAT SHE HAS BEEN TAKING CARE OF HER GRANDDAUGHTER THAT HAS BLISTERS IN HER THROAT FROM BEING SUPER CLOSE TO HER WHILE SHE WAS SICK AND WOULD LIKE TO SEE IF PROVIDER WOULD SEND ANTIBIOTICS INTO PHARMACY     PLEASE ADVISE

## 2022-12-23 NOTE — PROGRESS NOTES
"Mode of Visit: Video  Location of patient: home  You have chosen to receive care through a telehealth visit.  Does the patient consent to use a video/audio connection for your medical care today? Yes  The visit included audio interaction. Her internet is out at the time of her appointment.      Date: 2022  Name: Leticia Arreguin  : 1955         Chief Complaint:   Chief Complaint   Patient presents with   • Sinus Problem         HPI:  Leticia Arreguin is a 66 y.o. female presents for video visit in regard to concerns regarding exposure to strep throat. Son and granddaughter have both been diagnosed/treated for strep throat and sinus infection.  Family coming to her home to celebrate [place] tomorrow.  She has had \"yellow junk\" and blisters in her throat since last night. Headache.  No rash, fever, rash.       History: The following portions of the patient's history were reviewed and updated as appropriate: allergies, current medications, past medical history, family history, surgical history, social history and problem list.          Assessment/Plan:  Diagnoses and all orders for this visit:    1. Strep throat exposure (Primary)  -     amoxicillin-clavulanate (Augmentin) 875-125 MG per tablet; Take 1 tablet by mouth 2 (Two) Times a Day for 10 days.  Dispense: 20 tablet; Refill: 0.  She has a PCN allergy, is able to tolerate augmentin without adverse effects. She is aware she is contagious for 24 hours after starting antibiotic, if she has strep throat.         - Discard toothbrush after 24 hours on antibiotic.  Do not share eating/drinking utensils, wash in hot water.        - Warm salt water gargles, or OTC lozenges/sprays per package directions, as needed for sore throat.        - Drink plenty of clear, decaffeinated fluids, as tolerated.        - Acetaminophen or ibuprofen, per package directions, as needed for sore throat, fever 100.4, headache, mild pain        - Good handwashing practices " encouraged    2. Antibiotic-induced yeast infection  -     fluconazole (Diflucan) 150 MG tablet; Take 1 tablet by mouth Every 3 (Three) Days for 6 days.  Dispense: 2 tablet; Refill: 0  - Prefers to eat yogurt, take probiotics rather than take medication.  Rx sent in case it is needed over the holiday.            Return if symptoms worsen or fail to improve.  Patient was given instructions and counseling regarding her condition or for health maintenance advice. Please see specific information pulled into the AVS if appropriate.

## 2022-12-23 NOTE — PROGRESS NOTES
You have chosen to receive care through a telehealth visit.  Do you consent to use a video/audio connection for your medical care today? Yes  Active Parties: Estella JAY, Chin Bingham and patient

## 2023-02-09 NOTE — TELEPHONE ENCOUNTER
MEDICATION ORDERED BY NILE FOREMAN 12/15/2018. MEDICATION  DISPOSED OF 2021  
[Takes medication as prescribed] : takes

## 2023-03-06 ENCOUNTER — OFFICE VISIT (OUTPATIENT)
Dept: INTERNAL MEDICINE | Facility: CLINIC | Age: 68
End: 2023-03-06
Payer: MEDICARE

## 2023-03-06 ENCOUNTER — TELEPHONE (OUTPATIENT)
Dept: INTERNAL MEDICINE | Facility: CLINIC | Age: 68
End: 2023-03-06

## 2023-03-06 VITALS
HEIGHT: 69 IN | HEART RATE: 93 BPM | OXYGEN SATURATION: 98 % | TEMPERATURE: 97.5 F | DIASTOLIC BLOOD PRESSURE: 80 MMHG | SYSTOLIC BLOOD PRESSURE: 124 MMHG | WEIGHT: 160 LBS | BODY MASS INDEX: 23.7 KG/M2

## 2023-03-06 DIAGNOSIS — F41.9 ANXIETY: ICD-10-CM

## 2023-03-06 DIAGNOSIS — M25.522 LEFT ELBOW PAIN: Primary | ICD-10-CM

## 2023-03-06 DIAGNOSIS — I10 BENIGN ESSENTIAL HYPERTENSION: Chronic | ICD-10-CM

## 2023-03-06 DIAGNOSIS — G44.309 HEADACHES DUE TO OLD HEAD INJURY: ICD-10-CM

## 2023-03-06 DIAGNOSIS — F33.2 SEVERE EPISODE OF RECURRENT MAJOR DEPRESSIVE DISORDER, WITHOUT PSYCHOTIC FEATURES: ICD-10-CM

## 2023-03-06 DIAGNOSIS — Z79.890 POSTMENOPAUSAL HORMONE THERAPY: ICD-10-CM

## 2023-03-06 DIAGNOSIS — S09.90XS HEADACHES DUE TO OLD HEAD INJURY: ICD-10-CM

## 2023-03-06 PROCEDURE — 1160F RVW MEDS BY RX/DR IN RCRD: CPT | Performed by: NURSE PRACTITIONER

## 2023-03-06 PROCEDURE — 1159F MED LIST DOCD IN RCRD: CPT | Performed by: NURSE PRACTITIONER

## 2023-03-06 PROCEDURE — 3079F DIAST BP 80-89 MM HG: CPT | Performed by: NURSE PRACTITIONER

## 2023-03-06 PROCEDURE — 3074F SYST BP LT 130 MM HG: CPT | Performed by: NURSE PRACTITIONER

## 2023-03-06 PROCEDURE — 99214 OFFICE O/P EST MOD 30 MIN: CPT | Performed by: NURSE PRACTITIONER

## 2023-03-06 RX ORDER — ESTRADIOL 0.5 MG/1
0.5 TABLET ORAL DAILY
Qty: 90 TABLET | Refills: 1 | Status: SHIPPED | OUTPATIENT
Start: 2023-03-06

## 2023-03-06 NOTE — TELEPHONE ENCOUNTER
Caller: Leticia Arreguin    Relationship to patient: Self    Best call back number: [phone]    PATIENT STATES THAT SHE SAW BRIANNA AND SHE WAS REFERRING HER TO [facility], BUT SHE GOT A CALL FROM [facility] AND SHE IS QUESTIONING WHICH OFFICE SHE IS TO BE GOING TO.  PLEASE ADVISE.

## 2023-03-06 NOTE — TELEPHONE ENCOUNTER
Spoke with patient, she wants to see [facility] as she is an existing patient.  Noted in referrals.

## 2023-03-14 ENCOUNTER — TELEPHONE (OUTPATIENT)
Dept: INTERNAL MEDICINE | Facility: CLINIC | Age: 68
End: 2023-03-14

## 2023-03-14 NOTE — TELEPHONE ENCOUNTER
PATIENT WAS STARTED ON LOWEST DOSE OF ESTROGEN.  SHE IS WONDERING IF SHE CAN INCREASE THE DOSAGE, SHE HAS NOT HAD ANY REACTION.      PLEASE CALL [phone]

## 2023-03-14 NOTE — TELEPHONE ENCOUNTER
Hub staff attempted to follow warm transfer process and was unsuccessful     Caller: GETACHEW WIN    Relationship to patient: SELF    Best call back number: [phone]    Patient is needing: PATIENT WOULD LIKE TO KNOW ABOUT HER ORTHOPEDIC REFERRAL.  PLEASE CALL

## 2023-03-29 ENCOUNTER — TELEPHONE (OUTPATIENT)
Dept: INTERNAL MEDICINE | Facility: CLINIC | Age: 68
End: 2023-03-29
Payer: MEDICARE

## 2023-03-29 DIAGNOSIS — Z79.890 POSTMENOPAUSAL HORMONE THERAPY: ICD-10-CM

## 2023-03-29 NOTE — TELEPHONE ENCOUNTER
Please advise patient to stop taking new medication - estradiol. If symptoms fail to improve, may resume medication.   Should symptoms resolve with discontinuation ot medicine, will prescribe an estrogen vaginal cream or she can take OTC black cohosh and soy supplements per package directions.

## 2023-03-29 NOTE — TELEPHONE ENCOUNTER
Caller: Kallie Leticia KAMI    Relationship: Self    Best call back number: [phone]    What medications are you currently taking:   Current Outpatient Medications on File Prior to Visit   Medication Sig Dispense Refill   • albuterol sulfate  (90 Base) MCG/ACT inhaler INHALE 2 PUFFS EVERY 4 HOURS AS NEEDED FOR WHEEZING OR SHORTNESS OF AIR 18 g 3   • ASHWAGANDHA PO Take  by mouth.     • Azelastine HCl 137 MCG/SPRAY solution SPRAY 2 SPRAYS BY INTRANASAL ROUTE TWICE A DAY     • cyclobenzaprine (FLEXERIL) 10 MG tablet Take 1 tablet by mouth every night at bedtime. 30 tablet 1   • estradiol (ESTRACE) 0.5 MG tablet Take 1 tablet by mouth Daily. 90 tablet 1   • etodolac (LODINE) 400 MG tablet Take 1 tablet by mouth 2 (Two) Times a Day With Meals.     • Fexofenadine HCl (MUCINEX ALLERGY PO) Take  by mouth.     • FLUoxetine (PROzac) 10 MG tablet Take 0.5 tablets by mouth 2 (Two) Times a Day. 90 tablet 3   • fluticasone (FLONASE) 50 MCG/ACT nasal spray SPRAY 1 SPRAY BY INTRANASAL ROUTE EVERY DAY     • Glucosamine-Chondroitin (OSTEO BI-FLEX REGULAR STRENGTH PO) Take  by mouth.     • MAGNESIUM PO Take  by mouth.     • omeprazole (priLOSEC) 40 MG capsule TAKE 1 CAPSULE BY MOUTH TWICE A  capsule 3   • Probiotic Product (PROBIOTIC-10) capsule Take  by mouth.     • triamterene-hydrochlorothiazide (MAXZIDE-25) 37.5-25 MG per tablet Take 1 tablet by mouth Daily. 90 tablet 3     No current facility-administered medications on file prior to visit.          When did you start taking these medications: 4 WEEKS AGO    Which medication are you concerned about: ESTRADIOL .5MG    Who prescribed you this medication: BRIANNA GARCIA    What are your concerns:PATIENT IS EXPERIENCING WEIGHT GAIN AND PATIENT FEELS LIKE SHE IS SWELLING    How long have you had these concerns: A WEEK AFTER TAKING THESE MEDICATIONS  PLEASE ADVISE PATIENT IF THERE IS AN ALTERNATIVE MEDICATION

## 2023-05-12 DIAGNOSIS — F41.9 ANXIETY: ICD-10-CM

## 2023-05-12 RX ORDER — FLUOXETINE 10 MG/1
5 TABLET, FILM COATED ORAL 2 TIMES DAILY
Qty: 90 TABLET | Refills: 3 | Status: SHIPPED | OUTPATIENT
Start: 2023-05-12

## 2023-05-12 NOTE — TELEPHONE ENCOUNTER
Caller: Leticia Arreguin KAMI    Relationship: Self    Best call back number: [phone]   Requested Prescriptions:   Requested Prescriptions     Pending Prescriptions Disp Refills   • FLUoxetine (PROzac) 10 MG tablet 90 tablet 3     Sig: Take 0.5 tablets by mouth 2 (Two) Times a Day.        Pharmacy where request should be sent: [facility] [address] [place] [address] [phone] [facility] [phone] FX     Last office visit with prescribing clinician: 3/6/2023   Last telemedicine visit with prescribing clinician: 3/29/2023   Next office visit with prescribing clinician: Visit date not found     Does the patient have less than a 3 day supply:  [x] Yes  [] No    Would you like a call back once the refill request has been completed: [x] Yes [] No    If the office needs to give you a call back, can they leave a voicemail: [x] Yes [] No    Elysia Tirado Rep   05/12/23 08:53 EDT

## 2023-05-12 NOTE — TELEPHONE ENCOUNTER
Rx Refill Note  Requested Prescriptions     Pending Prescriptions Disp Refills    FLUoxetine (PROzac) 10 MG tablet 90 tablet 3     Sig: Take 0.5 tablets by mouth 2 (Two) Times a Day.      Last office visit with prescribing clinician: 3/6/2023   Last telemedicine visit with prescribing clinician: 3/29/2023   Next office visit with prescribing clinician: Visit date not found                         Would you like a call back once the refill request has been completed: [] Yes [] No    If the office needs to give you a call back, can they leave a voicemail: [] Yes [] No    Elysia Crane Rep  05/12/23, 09:44 EDT

## 2023-06-01 NOTE — PROGRESS NOTES
"Leticia Arreguin is a 63 y.o. female is here today for medication management follow-up.    Chief Complaint:      ICD-10-CM ICD-9-CM   1. INDIRA (generalized anxiety disorder) F41.1 300.02   2. 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 Pt is currently taking Lamotrigine 100 mg and is reporting more improvement in anxiety and mood symptoms. Reports she has had some improvement with insomnia symptoms since starting Lamotrigine. States Hydroxyzine has helped \"sometimes\"; struggles some nights with staying asleep.     Pt reports the presence/absence of the following anxiety symptoms: (+) excessive worry, (-) excessive fear, (+) difficulty relaxing, (+) restlessness, (+) insomnia, (+) easily fatigued, (-) irritability, (+) poor concentration, (+) racing thoughts, and (+) panic episodes.       The following portions of the patient's history were reviewed and updated as appropriate: allergies, current medications, past family history, past medical history, past social history, past surgical history and problem list.    Review of Systems;;  Review of Systems   Constitutional: Positive for fatigue. Negative for activity change, appetite change, unexpected weight gain and unexpected weight loss.   Respiratory: Negative.    Cardiovascular: Negative.  Negative for chest pain.   Gastrointestinal: Negative.  Negative for diarrhea, nausea and vomiting.   Genitourinary: Negative.    Musculoskeletal: Negative.    Skin: Negative for rash and bruise.   Neurological: Negative.  Negative for dizziness, seizures and speech difficulty.   Psychiatric/Behavioral: Positive for decreased concentration and sleep disturbance. Negative for agitation, behavioral problems, dysphoric mood, hallucinations, self-injury, suicidal ideas, negative for hyperactivity and stress. The patient is nervous/anxious.        Physical " "Exam;;  Physical Exam  Height 175.3 cm (69\"), weight 67.6 kg (149 lb), not currently breastfeeding.    Current Medications;;    Current Outpatient Medications:   •  Glucosamine-Chondroitin (OSTEO BI-FLEX REGULAR STRENGTH PO), Take  by mouth., Disp: , Rfl:   •  guaiFENesin (MUCINEX) 600 MG 12 hr tablet, Take 1,200 mg by mouth 2 (Two) Times a Day., Disp: , Rfl:   •  hydrOXYzine (ATARAX) 25 MG tablet, Take 1 to 2 tablets PO qhs, Disp: 60 tablet, Rfl: 2  •  ibuprofen (ADVIL,MOTRIN) 200 MG tablet, Take 200 mg by mouth Every 6 (Six) Hours As Needed for Mild Pain ., Disp: , Rfl:   •  lamoTRIgine (LaMICtal) 100 MG tablet, Take 1.5 tablets PO daily x 1 week then increase to 2 tablets PO daily, Disp: 58 tablet, Rfl: 0  •  Multiple Vitamins-Minerals (MULTIVITAMIN ADULT EXTRA C PO), multivitamin  1 po QDay, Disp: , Rfl:   •  omeprazole (priLOSEC) 40 MG capsule, TAKE ONE CAPSULE BY MOUTH EVERY DAY BEFORE A MEAL, Disp: , Rfl: 4  •  Probiotic Product (PROBIOTIC-10) capsule, Take  by mouth., Disp: , Rfl:   •  triamterene-hydrochlorothiazide (MAXZIDE-25) 37.5-25 MG per tablet, Take 1 tablet by mouth Daily., Disp: , Rfl: 3    Lab Results:       Mental Status Exam:   Hygiene:   good  Cooperation:  Cooperative  Eye Contact:  Good  Psychomotor Behavior:  Appropriate  Mood:anxious  Affect:  Appropriate  Hopelessness: Denies  Speech:  Normal  Thought Process:  Goal directed  Thought Content:  Normal  Suicidal:  None  Homicidal:  None  Hallucinations:  None  Delusion:  None  Memory:  Intact  Orientation:  Person, place, and time  Reliability:  good  Insight:  Good  Judgement:  Good  Impulse Control:  Good  Physical/Medical Issues:  No         Assessment Plan;;  Diagnoses and all orders for this visit:    INDIRA (generalized anxiety disorder)  -     lamoTRIgine (LaMICtal) 100 MG tablet; Take 1.5 tablets PO daily x 1 week then increase to 2 tablets PO daily    Insomnia due to mental condition  -     hydrOXYzine (ATARAX) 25 MG tablet; Take 1 to " 2 tablets PO qhs    Increase Lamotrigine; pt instructed to call for refill if positive response    Continue Hydroxyzine    Visit time: 11:10a-11:52a: 40 minutes spent in psychotherapy: provided supportive therapy. Discussion of past events and impact on current mood symptoms. Provided encouragement and validation of of emotions related to past experiences and impact on self-worth.      A psychological evaluation was conducted in order to assess past and current level of functioning. Areas assessed included, but were not limited to: perception of social support, perception of ability to face and deal with challenges in life (positive functioning), anxiety symptoms, depressive symptoms, perspective on beliefs/belief system, coping skills for stress, intelligence level,  Therapeutic rapport was established. Interventions conducted today were geared towards incorporating medication management along with support for continued therapy. Education was also provided as to the med management with this provider and what to expect in subsequent sessions.    We discussed risks, benefits,goals and side effects of the above medication and the patient was agreeable with the plan.Patient was educated on the importance of compliance with treatment and follow-up appointments. Patient is aware to contact the [facility] with any worsening of symptoms. To call for questions or concerns and return early if necessary. Patent is agreeable to go to the Emergency Department or call 911 should they begin SI/HI.     Treatment Plan: stabilize mood,  patient will stay out of the hospital and be at optimal level of functioning, take all medication as prescribed. Patient verbalized  understanding and agreement to plan.    Return in about 3 months (around 9/27/2019) for Follow Up.    Chantale Velazquez, ALTHEA, APRN, PMHNP-BC, FNP-C   Name band;

## 2023-06-07 DIAGNOSIS — I10 BENIGN ESSENTIAL HYPERTENSION: ICD-10-CM

## 2023-06-07 DIAGNOSIS — R60.0 BILATERAL LEG EDEMA: ICD-10-CM

## 2023-06-07 RX ORDER — TRIAMTERENE AND HYDROCHLOROTHIAZIDE 37.5; 25 MG/1; MG/1
TABLET ORAL
Qty: 90 TABLET | Refills: 3 | Status: SHIPPED | OUTPATIENT
Start: 2023-06-07

## 2023-09-26 ENCOUNTER — OFFICE VISIT (OUTPATIENT)
Dept: INTERNAL MEDICINE | Facility: CLINIC | Age: 68
End: 2023-09-26
Payer: MEDICARE

## 2023-09-26 VITALS
SYSTOLIC BLOOD PRESSURE: 120 MMHG | BODY MASS INDEX: 21.62 KG/M2 | HEART RATE: 80 BPM | DIASTOLIC BLOOD PRESSURE: 74 MMHG | TEMPERATURE: 98.6 F | WEIGHT: 146 LBS | HEIGHT: 69 IN | OXYGEN SATURATION: 98 %

## 2023-09-26 DIAGNOSIS — Z78.0 ENCOUNTER FOR OSTEOPOROSIS SCREENING IN ASYMPTOMATIC POSTMENOPAUSAL PATIENT: ICD-10-CM

## 2023-09-26 DIAGNOSIS — E78.5 DYSLIPIDEMIA: ICD-10-CM

## 2023-09-26 DIAGNOSIS — Z12.11 SCREEN FOR COLON CANCER: ICD-10-CM

## 2023-09-26 DIAGNOSIS — R29.898 LEG HEAVINESS: ICD-10-CM

## 2023-09-26 DIAGNOSIS — Z13.0 SCREENING FOR ENDOCRINE, METABOLIC AND IMMUNITY DISORDER: ICD-10-CM

## 2023-09-26 DIAGNOSIS — Z13.820 ENCOUNTER FOR OSTEOPOROSIS SCREENING IN ASYMPTOMATIC POSTMENOPAUSAL PATIENT: ICD-10-CM

## 2023-09-26 DIAGNOSIS — Z13.29 SCREENING FOR ENDOCRINE, METABOLIC AND IMMUNITY DISORDER: ICD-10-CM

## 2023-09-26 DIAGNOSIS — G24.5 EYE TWITCH: ICD-10-CM

## 2023-09-26 DIAGNOSIS — F41.9 ANXIETY: ICD-10-CM

## 2023-09-26 DIAGNOSIS — Z00.00 ENCOUNTER FOR SUBSEQUENT ANNUAL WELLNESS VISIT (AWV) IN MEDICARE PATIENT: Primary | ICD-10-CM

## 2023-09-26 DIAGNOSIS — Z12.31 ENCOUNTER FOR SCREENING MAMMOGRAM FOR MALIGNANT NEOPLASM OF BREAST: ICD-10-CM

## 2023-09-26 DIAGNOSIS — I10 BENIGN ESSENTIAL HYPERTENSION: ICD-10-CM

## 2023-09-26 DIAGNOSIS — R53.83 OTHER FATIGUE: ICD-10-CM

## 2023-09-26 DIAGNOSIS — R26.2 DIFFICULTY IN WALKING: ICD-10-CM

## 2023-09-26 DIAGNOSIS — F33.2 SEVERE EPISODE OF RECURRENT MAJOR DEPRESSIVE DISORDER, WITHOUT PSYCHOTIC FEATURES: ICD-10-CM

## 2023-09-26 DIAGNOSIS — Z13.228 SCREENING FOR ENDOCRINE, METABOLIC AND IMMUNITY DISORDER: ICD-10-CM

## 2023-09-26 PROCEDURE — 3078F DIAST BP <80 MM HG: CPT | Performed by: NURSE PRACTITIONER

## 2023-09-26 PROCEDURE — 3074F SYST BP LT 130 MM HG: CPT | Performed by: NURSE PRACTITIONER

## 2023-09-26 PROCEDURE — 1160F RVW MEDS BY RX/DR IN RCRD: CPT | Performed by: NURSE PRACTITIONER

## 2023-09-26 PROCEDURE — 99214 OFFICE O/P EST MOD 30 MIN: CPT | Performed by: NURSE PRACTITIONER

## 2023-09-26 PROCEDURE — G0439 PPPS, SUBSEQ VISIT: HCPCS | Performed by: NURSE PRACTITIONER

## 2023-09-26 PROCEDURE — 1159F MED LIST DOCD IN RCRD: CPT | Performed by: NURSE PRACTITIONER

## 2023-09-26 RX ORDER — DIPHENOXYLATE HYDROCHLORIDE AND ATROPINE SULFATE 2.5; .025 MG/1; MG/1
TABLET ORAL DAILY
COMMUNITY

## 2023-09-26 RX ORDER — CYCLOBENZAPRINE HCL 10 MG
10 TABLET ORAL
Qty: 30 TABLET | Refills: 1 | Status: SHIPPED | OUTPATIENT
Start: 2023-09-26

## 2023-09-26 RX ORDER — LEFLUNOMIDE 20 MG/1
1 TABLET ORAL DAILY
COMMUNITY
Start: 2023-09-15

## 2023-09-26 NOTE — PROGRESS NOTES
The ABCs of the Annual Wellness Visit  Subsequent Medicare Wellness Visit    Subjective    Leticia Arreguin is a 67 y.o. female who presents for a Subsequent Medicare Wellness Visit.    The following portions of the patient's history were reviewed and   updated as appropriate: allergies, current medications, past family history, past medical history, past social history, past surgical history, and problem list.    Compared to one year ago, the patient feels her physical   health is the same.    Compared to one year ago, the patient feels her mental   health is the same.    Recent Hospitalizations:  She was not admitted to the hospital during the last year.       Current Medical Providers:  Patient Care Team:  Estella Young APRN as PCP - General (Family Medicine)    Outpatient Medications Prior to Visit   Medication Sig Dispense Refill    albuterol sulfate  (90 Base) MCG/ACT inhaler INHALE 2 PUFFS EVERY 4 HOURS AS NEEDED FOR WHEEZING OR SHORTNESS OF AIR 18 g 3    ASHWAGANDHA PO Take  by mouth.      etodolac (LODINE) 400 MG tablet Take 1 tablet by mouth 2 (Two) Times a Day With Meals.      Fexofenadine HCl (MUCINEX ALLERGY PO) Take  by mouth.      FLUoxetine (PROzac) 10 MG tablet Take 0.5 tablets by mouth 2 (Two) Times a Day. 90 tablet 3    Glucosamine-Chondroitin (OSTEO BI-FLEX REGULAR STRENGTH PO) Take  by mouth.      leflunomide (ARAVA) 20 MG tablet Take 1 tablet by mouth Daily.      MAGNESIUM PO Take  by mouth.      multivitamin (MULTIVITAMIN PO) Take  by mouth Daily.      omeprazole (priLOSEC) 40 MG capsule TAKE 1 CAPSULE BY MOUTH TWICE A  capsule 3    Probiotic Product (PROBIOTIC-10) capsule Take  by mouth.      triamterene-hydrochlorothiazide (MAXZIDE-25) 37.5-25 MG per tablet TAKE 1 TABLET BY MOUTH EVERY DAY 90 tablet 3    cyclobenzaprine (FLEXERIL) 10 MG tablet Take 1 tablet by mouth every night at bedtime. 30 tablet 1    Azelastine HCl 137 MCG/SPRAY solution SPRAY 2 SPRAYS BY INTRANASAL  "ROUTE TWICE A DAY      estradiol (ESTRACE) 0.5 MG tablet Take 1 tablet by mouth Daily. 90 tablet 1    fluticasone (FLONASE) 50 MCG/ACT nasal spray SPRAY 1 SPRAY BY INTRANASAL ROUTE EVERY DAY       No facility-administered medications prior to visit.       No opioid medication identified on active medication list. I have reviewed chart for other potential  high risk medication/s and harmful drug interactions in the elderly.        Aspirin is not on active medication list.  Aspirin use is not indicated based on review of current medical condition/s. Risk of harm outweighs potential benefits.  .    Patient Active Problem List   Diagnosis    Asthma    Benign essential hypertension    Brachial (cervical) neuritis    Constipation    Gastroesophageal reflux disease with esophagitis    Fibromyalgia    Hearing loss    Common migraine with intractable migraine    Tinnitus    Cobalamin deficiency    Vitamin D deficiency    Major depression, recurrent    Dyslipidemia    Migraine    Insomnia    Osteoarthritis    Anxiety     Advance Care Planning   Advance Care Planning     Advance Directive is not on file.  ACP discussion was held with the patient during this visit. Patient does not have an advance directive, information provided.     Objective    Vitals:    09/26/23 1654   BP: 120/74   Pulse: 80   Temp: 98.6 øF (37 øC)   SpO2: 98%   Weight: 66.2 kg (146 lb)   Height: 175.3 cm (69.02\")   PainSc:   5     Estimated body mass index is 21.55 kg/mý as calculated from the following:    Height as of this encounter: 175.3 cm (69.02\").    Weight as of this encounter: 66.2 kg (146 lb).    BMI is within normal parameters. No other follow-up for BMI required.      Does the patient have evidence of cognitive impairment? No    Lab Results   Component Value Date    CHLPL 306 (H) 09/27/2023    TRIG 75 09/27/2023    HDL 68 (H) 09/27/2023     (H) 09/27/2023    VLDL 11 09/27/2023        HEALTH RISK ASSESSMENT    Smoking Status:  Social " History     Tobacco Use   Smoking Status Never   Smokeless Tobacco Never     Alcohol Consumption:  Social History     Substance and Sexual Activity   Alcohol Use No     Fall Risk Screen:    DEEPTHIADI Fall Risk Assessment was completed, and patient is at LOW risk for falls.Assessment completed on:2023    Depression Screenin/26/2023     5:06 PM   PHQ-2/PHQ-9 Depression Screening   Little Interest or Pleasure in Doing Things 0-->not at all   Feeling Down, Depressed or Hopeless 0-->not at all   PHQ-9: Brief Depression Severity Measure Score 0       Health Habits and Functional and Cognitive Screenin/26/2023     5:07 PM   Functional & Cognitive Status   Do you have difficulty preparing food and eating? No   Do you have difficulty bathing yourself, getting dressed or grooming yourself? No   Do you have difficulty using the toilet? No   Do you have difficulty moving around from place to place? No   Do you have trouble with steps or getting out of a bed or a chair? No   Current Diet Well Balanced Diet   Dental Exam Up to date   Eye Exam Not up to date   Exercise (times per week) 7 times per week   Current Exercises Include Walking;Swimming   Do you need help using the phone?  No   Are you deaf or do you have serious difficulty hearing?  Yes   Do you need help to go to places out of walking distance? No   Do you need help shopping? No   Do you need help preparing meals?  No   Do you need help with housework?  No   Do you need help with laundry? No   Do you need help taking your medications? No   Do you need help managing money? No   Do you ever drive or ride in a car without wearing a seat belt? No   Have you felt unusual stress, anger or loneliness in the last month? No   Who do you live with? Spouse   If you need help, do you have trouble finding someone available to you? No   Have you been bothered in the last four weeks by sexual problems? No   Do you have difficulty concentrating, remembering or  making decisions? No       Age-appropriate Screening Schedule:  Refer to the list below for future screening recommendations based on patient's age, sex and/or medical conditions. Orders for these recommended tests are listed in the plan section. The patient has been provided with a written plan.    Health Maintenance   Topic Date Due    COLORECTAL CANCER SCREENING  08/20/2020    MAMMOGRAM  12/21/2022    INFLUENZA VACCINE  08/01/2023    DXA SCAN  08/10/2023    COVID-19 Vaccine (2 - 2023-24 season) 09/01/2023    TDAP/TD VACCINES (1 - Tdap) 10/18/2023 (Originally 12/29/1974)    ZOSTER VACCINE (1 of 2) 10/18/2023 (Originally 12/29/2005)    Pneumococcal Vaccine 65+ (1 - PCV) 12/22/2023 (Originally 12/29/1961)    ANNUAL WELLNESS VISIT  09/26/2024    LIPID PANEL  09/27/2024    HEPATITIS C SCREENING  Completed    PAP SMEAR  Discontinued                  CMS Preventative Services Quick Reference  Risk Factors Identified During Encounter  Chronic Pain: Natural history and expected course discussed. Questions answered.  Depression/Dysphoria: Current medication is effective, no change recommended  Immunizations Discussed/Encouraged: Influenza and COVID19  The above risks/problems have been discussed with the patient.  Pertinent information has been shared with the patient in the After Visit Summary.  An After Visit Summary and PPPS were made available to the patient.    Follow Up:   Next Medicare Wellness visit to be scheduled in 1 year.       Additional E&M Note during same encounter follows:  Patient has multiple medical problems which are significant and separately identifiable that require additional work above and beyond the Medicare Wellness Visit.      Chief Complaint  Medicare Wellness-subsequent    Subjective        HPI  Leticia Arreguin is also being seen today for follow up of HTN, anxiety, depression. She is concerned about weakness, feeling of her legs turning to spaghetti and she feels like they won't hold her.   "She has difficulty walking at times. Tries to hide it from others.      Picked up a salad on the way home from [place] in August.  Had food poisoning.  Lost 8 pounds in a day.  Her left eyelid has been slowly twitching since then.      Follows Rheumatology.  Medication recently changed.      Review of Systems   All other systems reviewed and are negative.      Objective   Vital Signs:  /74   Pulse 80   Temp 98.6 øF (37 øC)   Ht 175.3 cm (69.02\")   Wt 66.2 kg (146 lb)   SpO2 98%   BMI 21.55 kg/mý     Physical Exam  Constitutional:       Appearance: She is well-developed. She is not ill-appearing.   HENT:      Head: Normocephalic.      Right Ear: Tympanic membrane, ear canal and external ear normal.      Left Ear: Tympanic membrane, ear canal and external ear normal.      Nose: Nose normal.      Mouth/Throat:      Mouth: Mucous membranes are moist.      Pharynx: Oropharynx is clear. Uvula midline.   Eyes:      Extraocular Movements: Extraocular movements intact.      Conjunctiva/sclera: Conjunctivae normal.      Pupils: Pupils are equal, round, and reactive to light.   Neck:      Thyroid: No thyromegaly.      Vascular: No carotid bruit.   Cardiovascular:      Rate and Rhythm: Normal rate and regular rhythm.      Pulses:           Radial pulses are 2+ on the right side and 2+ on the left side.        Dorsalis pedis pulses are 2+ on the right side and 2+ on the left side.      Heart sounds: Normal heart sounds.   Pulmonary:      Effort: Pulmonary effort is normal.      Breath sounds: Normal breath sounds.   Abdominal:      General: Bowel sounds are normal.      Palpations: Abdomen is soft.      Tenderness: There is no abdominal tenderness.   Musculoskeletal:         General: No tenderness or deformity. Normal range of motion.      Cervical back: Full passive range of motion without pain, normal range of motion and neck supple.      Right lower leg: No edema.      Left lower leg: No edema.   Lymphadenopathy:      " Cervical: No cervical adenopathy.   Skin:     General: Skin is warm.      Capillary Refill: Capillary refill takes less than 2 seconds.   Neurological:      Mental Status: She is alert and oriented to person, place, and time.      Sensory: No sensory deficit.      Coordination: Coordination normal.      Gait: Gait normal.      Comments: CN II-XII normal   Psychiatric:         Attention and Perception: Attention normal.         Mood and Affect: Mood and affect normal.         Speech: Speech normal.         Behavior: Behavior normal.         Thought Content: Thought content normal.                         Assessment and Plan   Diagnoses and all orders for this visit:    1. Encounter for subsequent annual wellness visit (AWV) in Medicare patient (Primary)    2. Benign essential hypertension  -     Comprehensive Metabolic Panel        - Follow heart healthy diet.  Keep sodium intake < 1500 mg per day.  Avoid processed & fast foods.          - Exercise as tolerated, with a goal of 30 minutes of moderate exercise most days.         - Take medications as prescribed.    3. Anxiety  4. Severe episode of recurrent major depressive disorder, without psychotic features        - Encouraged to take part in daily physical exercise.          - Eat healthy, well balanced diet; avoid sugary foods or beverages        - Limit alcohol intake        - Ensure good night's sleep by creating calm space in bedroom, avoiding screen time 1-2 hours before bed, no caffeine after 5 pm        - Talk to supportive family and friends, as needed        - Consider journaling, other creative way to express feelings, if needed        - Continue ashwaganda supplements     5. Dyslipidemia  -     Lipid Panel  -     ezetimibe (Zetia) 10 MG tablet; Take 1 tablet by mouth Daily.  Dispense: 90 tablet; Refill: 3        - Follow heart healthy diet.  Keep sodium intake < 1500 mg per day.  Avoid processed & fast foods.          - Exercise as tolerated, with a goal  of 30 minutes of moderate exercise most days.         - Take medications as prescribed.    6. Leg heaviness  -     VGCC Antibody  -     TSH  -     T4, Free  -     Magnesium  -     MRI brain w wo contrast; Future    7. Eye twitch  -     VGCC Antibody    8. Encounter for osteoporosis screening in asymptomatic postmenopausal patient  -     dexa bone density axial; Future    9. Encounter for screening mammogram for malignant neoplasm of breast  -     Mammo screening digital tomosynthesis bilateral w CAD; Future    10. Screen for colon cancer  -     Cologuard - Stool, Per Rectum; Future    11. Screening for endocrine, metabolic and immunity disorder  -     TSH  -     T4, Free  -     Comprehensive Metabolic Panel  -     Magnesium    12. Other fatigue  -     TSH  -     T4, Free  -     MRI brain w wo contrast; Future    13. Difficulty in walking  -     MRI brain w wo contrast; Future               Follow Up   Return in about 1 year (around 9/26/2024) for Medicare Wellness.  Patient was given instructions and counseling regarding her condition or for health maintenance advice. Please see specific information pulled into the AVS if appropriate.

## 2023-09-29 LAB
ALBUMIN SERPL-MCNC: 4.3 G/DL (ref 3.5–5.2)
ALBUMIN/GLOB SERPL: 2 G/DL
ALP SERPL-CCNC: 86 U/L (ref 39–117)
ALT SERPL-CCNC: 25 U/L (ref 1–33)
AST SERPL-CCNC: 21 U/L (ref 1–32)
BILIRUB SERPL-MCNC: 0.5 MG/DL (ref 0–1.2)
BUN SERPL-MCNC: 18 MG/DL (ref 8–23)
BUN/CREAT SERPL: 23.4 (ref 7–25)
CALCIUM SERPL-MCNC: 9.8 MG/DL (ref 8.6–10.5)
CHLORIDE SERPL-SCNC: 102 MMOL/L (ref 98–107)
CHOLEST SERPL-MCNC: 306 MG/DL (ref 0–200)
CO2 SERPL-SCNC: 25.9 MMOL/L (ref 22–29)
CREAT SERPL-MCNC: 0.77 MG/DL (ref 0.57–1)
EGFRCR SERPLBLD CKD-EPI 2021: 84.7 ML/MIN/1.73
GLOBULIN SER CALC-MCNC: 2.2 GM/DL
GLUCOSE SERPL-MCNC: 95 MG/DL (ref 65–99)
HDLC SERPL-MCNC: 68 MG/DL (ref 40–60)
LDLC SERPL CALC-MCNC: 227 MG/DL (ref 0–100)
MAGNESIUM SERPL-MCNC: 2.4 MG/DL (ref 1.6–2.4)
POTASSIUM SERPL-SCNC: 4.7 MMOL/L (ref 3.5–5.2)
PROT SERPL-MCNC: 6.5 G/DL (ref 6–8.5)
SODIUM SERPL-SCNC: 138 MMOL/L (ref 136–145)
T4 FREE SERPL-MCNC: 0.96 NG/DL (ref 0.93–1.7)
TRIGL SERPL-MCNC: 75 MG/DL (ref 0–150)
TSH SERPL DL<=0.005 MIU/L-ACNC: 1.79 UIU/ML (ref 0.27–4.2)
VGCC AB SER-SCNC: 3.5 PMOL/L (ref 0–30)
VLDLC SERPL CALC-MCNC: 11 MG/DL (ref 5–40)

## 2023-09-29 RX ORDER — EZETIMIBE 10 MG/1
10 TABLET ORAL DAILY
Qty: 90 TABLET | Refills: 3 | Status: SHIPPED | OUTPATIENT
Start: 2023-09-29

## 2023-10-16 ENCOUNTER — APPOINTMENT (OUTPATIENT)
Dept: BONE DENSITY | Facility: HOSPITAL | Age: 68
End: 2023-10-16
Payer: MEDICARE

## 2023-10-16 DIAGNOSIS — Z13.820 ENCOUNTER FOR OSTEOPOROSIS SCREENING IN ASYMPTOMATIC POSTMENOPAUSAL PATIENT: ICD-10-CM

## 2023-10-16 DIAGNOSIS — Z78.0 ENCOUNTER FOR OSTEOPOROSIS SCREENING IN ASYMPTOMATIC POSTMENOPAUSAL PATIENT: ICD-10-CM

## 2023-10-16 PROCEDURE — 77080 DXA BONE DENSITY AXIAL: CPT

## 2023-10-17 ENCOUNTER — TELEPHONE (OUTPATIENT)
Dept: INTERNAL MEDICINE | Facility: CLINIC | Age: 68
End: 2023-10-17

## 2023-10-17 NOTE — TELEPHONE ENCOUNTER
Caller: Leticia Arreguin    Relationship: Self    Best call back number: [phone]     What is the best time to reach you: ANYTIME  SOONER RATHER THAN LATER    Who are you requesting to speak with (clinical staff, provider,  specific staff member): BRIANNA GARCIA    What was the call regarding: PATIENT WOULD LIKE TO SPEAK WITH PCP, SHE HAS QUESTIONS ABOUT HER MOST RECENT VISIT AND THE UPCOMING TESTS AND TESTS THAT SHE HAS HAD.

## 2023-10-24 NOTE — TELEPHONE ENCOUNTER
"Spoke with patient.  Discussed reason for having MRI brain. She has had floaters, \"vertical cloud\" in her vision.  Evaluated by ophthalmologist rapidly afterward.  Advised might be a retinal tear but that was unlikely.  Concerned it might be related to connective tissue issue/EDS.  Seeing Rheumatologist tomorrow. Continues to have profound leg weakness, \"spaghetti legs\" as she calls it.  MRI brain scheduled at the first of December.    "

## 2023-12-01 ENCOUNTER — HOSPITAL ENCOUNTER (OUTPATIENT)
Dept: MRI IMAGING | Facility: HOSPITAL | Age: 68
Discharge: HOME | End: 2023-12-01
Admitting: NURSE PRACTITIONER
Payer: MEDICARE

## 2023-12-01 ENCOUNTER — PATIENT MESSAGE (OUTPATIENT)
Dept: INTERNAL MEDICINE | Facility: CLINIC | Age: 68
End: 2023-12-01
Payer: MEDICARE

## 2023-12-01 DIAGNOSIS — R29.898 LEG HEAVINESS: ICD-10-CM

## 2023-12-01 DIAGNOSIS — R53.83 OTHER FATIGUE: ICD-10-CM

## 2023-12-01 DIAGNOSIS — R26.2 DIFFICULTY IN WALKING: ICD-10-CM

## 2023-12-01 PROCEDURE — 0 GADOBENATE DIMEGLUMINE 529 MG/ML SOLUTION: Performed by: NURSE PRACTITIONER

## 2023-12-01 PROCEDURE — A9577 INJ MULTIHANCE: HCPCS | Performed by: NURSE PRACTITIONER

## 2023-12-01 PROCEDURE — 70553 MRI BRAIN STEM W/O & W/DYE: CPT

## 2023-12-01 RX ADMIN — GADOBENATE DIMEGLUMINE 14 ML: 529 INJECTION, SOLUTION INTRAVENOUS at 09:11

## 2023-12-07 ENCOUNTER — TELEPHONE (OUTPATIENT)
Dept: INTERNAL MEDICINE | Facility: CLINIC | Age: 68
End: 2023-12-07
Payer: MEDICARE

## 2023-12-07 DIAGNOSIS — R29.898 LEG HEAVINESS: Primary | ICD-10-CM

## 2023-12-07 NOTE — TELEPHONE ENCOUNTER
Caller: Leticia Arreguin    Relationship: Self    Best call back number: [phone]    What test was performed: BRAIN SCAN     When was the test performed: 12/1/23    Where was the test performed: [facility]    PATIENT IS ASKING WHEN SHE WILL HEAR BACK WITH RESULTS

## 2023-12-07 NOTE — TELEPHONE ENCOUNTER
Relayed to patient. She said that the mastoid issue is a family thing. Her dad, sister and great niece had issues. Saw an ENT and it didn't help. Asked for more info. as to why her legs feel like spaghetti and that she is exhausted. You had mentioned MS.

## 2023-12-29 RX ORDER — CYCLOBENZAPRINE HCL 10 MG
10 TABLET ORAL
Qty: 30 TABLET | Refills: 1 | Status: SHIPPED | OUTPATIENT
Start: 2023-12-29

## 2024-01-22 ENCOUNTER — HOSPITAL ENCOUNTER (OUTPATIENT)
Dept: MAMMOGRAPHY | Facility: HOSPITAL | Age: 69
Discharge: HOME | End: 2024-01-22
Admitting: NURSE PRACTITIONER
Payer: MEDICARE

## 2024-01-22 DIAGNOSIS — Z12.31 ENCOUNTER FOR SCREENING MAMMOGRAM FOR MALIGNANT NEOPLASM OF BREAST: ICD-10-CM

## 2024-01-22 PROCEDURE — 77063 BREAST TOMOSYNTHESIS BI: CPT

## 2024-01-22 PROCEDURE — 77067 SCR MAMMO BI INCL CAD: CPT

## 2024-01-29 ENCOUNTER — OFFICE VISIT (OUTPATIENT)
Dept: INTERNAL MEDICINE | Facility: CLINIC | Age: 69
End: 2024-01-29
Payer: MEDICARE

## 2024-01-29 VITALS
WEIGHT: 147 LBS | HEART RATE: 77 BPM | HEIGHT: 69 IN | TEMPERATURE: 97.5 F | OXYGEN SATURATION: 100 % | SYSTOLIC BLOOD PRESSURE: 126 MMHG | BODY MASS INDEX: 21.77 KG/M2 | DIASTOLIC BLOOD PRESSURE: 86 MMHG | RESPIRATION RATE: 18 BRPM

## 2024-01-29 DIAGNOSIS — R05.9 COUGH, UNSPECIFIED TYPE: ICD-10-CM

## 2024-01-29 DIAGNOSIS — U07.1 COVID-19: Primary | ICD-10-CM

## 2024-01-29 LAB
EXPIRATION DATE: ABNORMAL
FLUAV AG UPPER RESP QL IA.RAPID: NOT DETECTED
FLUBV AG UPPER RESP QL IA.RAPID: NOT DETECTED
INTERNAL CONTROL: ABNORMAL
Lab: ABNORMAL
SARS-COV-2 AG UPPER RESP QL IA.RAPID: DETECTED

## 2024-01-29 PROCEDURE — 3074F SYST BP LT 130 MM HG: CPT | Performed by: NURSE PRACTITIONER

## 2024-01-29 PROCEDURE — 99213 OFFICE O/P EST LOW 20 MIN: CPT | Performed by: NURSE PRACTITIONER

## 2024-01-29 PROCEDURE — 1159F MED LIST DOCD IN RCRD: CPT | Performed by: NURSE PRACTITIONER

## 2024-01-29 PROCEDURE — 1160F RVW MEDS BY RX/DR IN RCRD: CPT | Performed by: NURSE PRACTITIONER

## 2024-01-29 PROCEDURE — 3079F DIAST BP 80-89 MM HG: CPT | Performed by: NURSE PRACTITIONER

## 2024-01-29 PROCEDURE — 87428 SARSCOV & INF VIR A&B AG IA: CPT | Performed by: NURSE PRACTITIONER

## 2024-02-02 ENCOUNTER — TELEPHONE (OUTPATIENT)
Dept: INTERNAL MEDICINE | Facility: CLINIC | Age: 69
End: 2024-02-02
Payer: MEDICARE

## 2024-02-02 NOTE — TELEPHONE ENCOUNTER
Caller: Leticia Arreguin    Relationship: Self    Best call back number:[phone]     What medication are you requesting: AUGMENTIN     What are your current symptoms: HEADACHE, SINUS PRESSURE, COLORED MUCUS   How long have you been experiencing symptoms: SYMPTOMS ARE WORSE    Have you had these symptoms before:    [x] Yes  [] No    Have you been treated for these symptoms before:   [x] Yes  [] No    If a prescription is needed, what is your preferred pharmacy and phone number: [id number] [address] [phone] [place] [phone]      Additional notes:PATIENT HAS COVID.  PATIENT BELIEVED SHE ALSO HAS A SINUS INFECTION.

## 2024-02-05 RX ORDER — DOXYCYCLINE HYCLATE 100 MG/1
100 CAPSULE ORAL 2 TIMES DAILY
Qty: 20 CAPSULE | Refills: 0 | Status: SHIPPED | OUTPATIENT
Start: 2024-02-05 | End: 2024-02-15

## 2024-02-05 NOTE — TELEPHONE ENCOUNTER
Sent rx for doxycycline 100 mg BID x 10 days, if she's still having symptoms.  Take at least 4 hours apart from [facility]N, magnesium.

## 2024-04-08 ENCOUNTER — TELEPHONE (OUTPATIENT)
Dept: INTERNAL MEDICINE | Facility: CLINIC | Age: 69
End: 2024-04-08
Payer: MEDICARE

## 2024-04-08 NOTE — TELEPHONE ENCOUNTER
"Covid test neg. Has had this for 9 days. She said it is very \"gross'. She has to be careful due to her immunity and his surgery. Taking Mucinex bid and Zytrec sometimes. Stated that it does not help. Recommended saline spray or gel due to dryness. Did not recommend Flonase due to the nosebleed. Further suggestions?   "

## 2024-04-08 NOTE — TELEPHONE ENCOUNTER
Caller: Leticia Arreguin    Relationship: Self    Best call back number: [phone]     What is the best time to reach you: ANYTIME TODAY    Who are you requesting to speak with (clinical staff, provider,  specific staff member):  OR CLINICAL STAFF    THE PATIENT HAS SINUS DRAINAGE THAT IS THICK AND GREEN COMING FROM THE LEFT SIDE OF HER NOSE.  SHE HAD A NOSE BLEED ON SATURDAY.  THE PATIENT'S  HAD AN EMERGENCY PACEMAKER IMPLANT AND SHE IS WITH KIDS ALL THE TIME.    THE PATIENT IS ASKING WHAT SHE CAN DO TO GET BETTER AND NOT SPREAD THIS TO OTHERS.    PLEASE CALL THE PATIENT  TO DISCUSS.

## 2024-04-09 RX ORDER — DOXYCYCLINE HYCLATE 100 MG/1
100 CAPSULE ORAL 2 TIMES DAILY
Qty: 20 CAPSULE | Refills: 0 | Status: SHIPPED | OUTPATIENT
Start: 2024-04-09 | End: 2024-04-19

## 2024-04-09 NOTE — TELEPHONE ENCOUNTER
Use saline nasal gel as recommended.  Can wear a mask when around others, if she feels that's needed.  Continue good handwashing practices, as I know she always does. Sending rx for doxycycline 100 mg BID for 10 days to take if symptoms fail to improve.     Can also take OTC coricidin cold/sinus medication per package directions as needed.      Use warm compress to face prn sinus pressure.  Use humidifier HS, if not already doing so. Make sure to stay hydrated, take good care of herself while caring for her  and kids.

## 2024-04-09 NOTE — TELEPHONE ENCOUNTER
Caller: Leticia Arreguin    Relationship: Self    Best call back number: [phone]     Who are you requesting to speak with (clinical staff, provider,  specific staff member): CLINICAL    What was the call regarding: CHECKING STATUS ON MESSAGE, DECLINES APPOINTMENT AT THIS TIME    Is it okay if the provider responds through MyChart: NO

## 2024-04-09 NOTE — TELEPHONE ENCOUNTER
Spoke with patient, advised of provider reply. Is very thankful for the abx and the prompt response.

## 2024-04-20 DIAGNOSIS — F41.9 ANXIETY: ICD-10-CM

## 2024-04-23 RX ORDER — FLUOXETINE 10 MG/1
TABLET, FILM COATED ORAL
Qty: 90 TABLET | Refills: 3 | Status: SHIPPED | OUTPATIENT
Start: 2024-04-23

## 2024-06-06 DIAGNOSIS — R60.0 BILATERAL LEG EDEMA: ICD-10-CM

## 2024-06-06 DIAGNOSIS — I10 BENIGN ESSENTIAL HYPERTENSION: ICD-10-CM

## 2024-06-06 RX ORDER — TRIAMTERENE AND HYDROCHLOROTHIAZIDE 37.5; 25 MG/1; MG/1
TABLET ORAL
Qty: 90 TABLET | Refills: 3 | Status: SHIPPED | OUTPATIENT
Start: 2024-06-06

## 2024-09-21 DIAGNOSIS — E78.5 DYSLIPIDEMIA: ICD-10-CM

## 2024-09-23 RX ORDER — EZETIMIBE 10 MG/1
10 TABLET ORAL DAILY
Qty: 90 TABLET | Refills: 3 | Status: SHIPPED | OUTPATIENT
Start: 2024-09-23

## 2024-11-21 ENCOUNTER — PATIENT MESSAGE (OUTPATIENT)
Dept: INTERNAL MEDICINE | Facility: CLINIC | Age: 69
End: 2024-11-21
Payer: MEDICARE

## 2024-11-21 NOTE — TELEPHONE ENCOUNTER
Patient scheduled for tomorrow, she may try to go to [facility] this evening. She will Cx if she is seen @ [facility].

## 2024-11-29 ENCOUNTER — TRANSCRIBE ORDERS (OUTPATIENT)
Dept: ADMINISTRATIVE | Facility: HOSPITAL | Age: 69
End: 2024-11-29
Payer: MEDICARE

## 2024-11-29 DIAGNOSIS — M84.750S ATYPICAL FEMORAL FRACTURE, UNSPECIFIED, SEQUELA: Primary | ICD-10-CM

## 2025-02-07 ENCOUNTER — TELEPHONE (OUTPATIENT)
Dept: INTERNAL MEDICINE | Facility: CLINIC | Age: 70
End: 2025-02-07
Payer: MEDICARE

## 2025-02-07 NOTE — TELEPHONE ENCOUNTER
Caller: Leticia Arreguin    Relationship: Self    Best call back number: [phone]     What medication are you requesting: AUGMENTIN     What are your current symptoms: CONGESTION, EAR PAIN, YELLOW MUCOUS    How long have you been experiencing symptoms: OVER A WEEK    Have you had these symptoms before:    [x] Yes  [] No    Have you been treated for these symptoms before:   [x] Yes  [] No    If a prescription is needed, what is your preferred pharmacy and phone number:    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  HAS JUST HAD KNEE SURGERY

## 2025-03-04 NOTE — TELEPHONE ENCOUNTER
INTERFACE REQUEST Patient reevaluated with second spontaneous breathing trial the day.  Significant improvement and tidal volumes up to 4-500, spontaneously awake intermittently following commands, significant tachycardia from the 130s to the 160s in sinus as well as hypertension.  Trialed 25 mcg IV fentanyl x 1 with no improvement in tachycardia or hypertension but intermittent apneas.  Continues to remain stable but given her tachycardia and hypertension will not tolerate extubation at this point.  Will continue current ventilator settings as directed by telemetry ICU today with reattempt at extubation in a.m.   personally updated and he was very appreciative of the care.    Crispin Jacobson MD    I have spent greater than 30 minutes ofcritical care time related to direct patient care, care coordination, and communication exclusive of procedures with focus of care related to spontaneous breathing trial and consideration for extubation.

## 2025-03-11 ENCOUNTER — OFFICE VISIT (OUTPATIENT)
Dept: INTERNAL MEDICINE | Facility: CLINIC | Age: 70
End: 2025-03-11
Payer: MEDICARE

## 2025-03-11 VITALS
WEIGHT: 142 LBS | RESPIRATION RATE: 18 BRPM | HEART RATE: 75 BPM | BODY MASS INDEX: 21.03 KG/M2 | SYSTOLIC BLOOD PRESSURE: 126 MMHG | TEMPERATURE: 98.4 F | OXYGEN SATURATION: 100 % | HEIGHT: 69 IN | DIASTOLIC BLOOD PRESSURE: 82 MMHG

## 2025-03-11 DIAGNOSIS — B37.9 YEAST INFECTION: ICD-10-CM

## 2025-03-11 DIAGNOSIS — R30.0 DYSURIA: Primary | ICD-10-CM

## 2025-03-11 LAB
BILIRUB BLD-MCNC: ABNORMAL MG/DL
CLARITY, POC: CLEAR
COLOR UR: YELLOW
EXPIRATION DATE: ABNORMAL
GLUCOSE UR STRIP-MCNC: NEGATIVE MG/DL
KETONES UR QL: NEGATIVE
LEUKOCYTE EST, POC: ABNORMAL
Lab: ABNORMAL
NITRITE UR-MCNC: NEGATIVE MG/ML
PH UR: 7 [PH] (ref 5–8)
PROT UR STRIP-MCNC: NEGATIVE MG/DL
RBC # UR STRIP: NEGATIVE /UL
SP GR UR: 1.01 (ref 1–1.03)
UROBILINOGEN UR QL: ABNORMAL

## 2025-03-11 RX ORDER — NYSTATIN 100000 U/G
1 OINTMENT TOPICAL 2 TIMES DAILY
Qty: 30 G | Refills: 1 | Status: SHIPPED | OUTPATIENT
Start: 2025-03-11

## 2025-03-11 RX ORDER — FLUCONAZOLE 150 MG/1
150 TABLET ORAL WEEKLY
Qty: 2 TABLET | Refills: 0 | Status: SHIPPED | OUTPATIENT
Start: 2025-03-11 | End: 2025-03-19

## 2025-03-11 NOTE — PROGRESS NOTES
Office Visit      Patient Name: Leticia Arreguin  : 1955   MRN: [id number]     Chief Complaint:    Chief Complaint   Patient presents with    Dysuria     X1 month     Earache     Started last night        History of Present Illness: Leticia Arreguin is a 69 y.o. female who is here today to discuss 1 month of strong urine odor and a little sharp pain when urinating. One month ago she was treated with Omnicef for sinusitis which caused diarrhea. She completed a yeast treatment just in case but symptoms did not improve. Last night she developed ear pain, postnasal drip and some sore throat after working outside all day yesterday. No fever.      Subjective      I have reviewed and the following portions of the patient's history were updated as appropriate: past family history, past medical history, past social history, past surgical history and problem list.      Current Outpatient Medications:     ASHWAGANDHA PO, Take  by mouth., Disp: , Rfl:     cyclobenzaprine (FLEXERIL) 10 MG tablet, TAKE 1 TABLET BY MOUTH EVERYDAY AT BEDTIME, Disp: 30 tablet, Rfl: 1    Enbrel SureClick 50 MG/ML solution auto-injector, , Disp: , Rfl:     etodolac (LODINE) 400 MG tablet, Take 1 tablet by mouth 2 (Two) Times a Day With Meals., Disp: , Rfl:     ezetimibe (ZETIA) 10 MG tablet, TAKE 1 TABLET BY MOUTH EVERY DAY, Disp: 90 tablet, Rfl: 3    FLUoxetine HCl, PMDD, 10 MG tablet, Take 0.5 tablets every day by oral route., Disp: , Rfl:     fluticasone (FLONASE) 50 MCG/ACT nasal spray, 1-2 sprays each nostril daily, Disp: 16 g, Rfl: 0    guaiFENesin (MUCINEX) 600 MG 12 hr tablet, Take 2 tablets by mouth., Disp: , Rfl:     leflunomide (ARAVA) 20 MG tablet, Take 1 tablet by mouth Daily., Disp: , Rfl:     MAGNESIUM PO, Take  by mouth., Disp: , Rfl:     multivitamin (MULTIVITAMIN PO), Take  by mouth Daily., Disp: , Rfl:     Probiotic Product (PROBIOTIC-10) capsule, Take  by mouth., Disp: , Rfl:     triamterene-hydrochlorothiazide  "(MAXZIDE-25) 37.5-25 MG per tablet, TAKE 1 TABLET BY MOUTH EVERY DAY, Disp: 90 tablet, Rfl: 3    amoxicillin-clavulanate (AUGMENTIN) 875-125 MG per tablet, Take 1 tablet by mouth 2 (Two) Times a Day for 7 days., Disp: 14 tablet, Rfl: 0    fluconazole (Diflucan) 150 MG tablet, Take 1 tablet by mouth 1 (One) Time Per Week for 2 doses., Disp: 2 tablet, Rfl: 0    nystatin (MYCOSTATIN) 365014 UNIT/GM ointment, Apply 1 Application topically to the appropriate area as directed 2 (Two) Times a Day., Disp: 30 g, Rfl: 1    Allergies   Allergen Reactions    Sulfa Antibiotics Anaphylaxis    Sulfamethoxazole-Trimethoprim Anaphylaxis    Melatonin Irritability    Prednisone Rash     \"flaming rash\"    Statins Myalgia    Ciprofloxacin Rash    Clarithromycin Rash    Gabapentin Rash    Nitrofurantoin Nausea Only    Penicillins Rash    Risperidone Rash    Zyprexa [Olanzapine] Irritability     Extreme agitation       Objective     Vital Signs:   Vitals:    03/11/25 0826   BP: 126/82   Pulse: 75   Resp: 18   Temp: 98.4 °F (36.9 °C)   SpO2: 100%   Weight: 64.4 kg (142 lb)   Height: 175.3 cm (69\")     Body mass index is 20.97 kg/m².    Physical Exam  Vitals and nursing note reviewed.   Constitutional:       General: She is not in acute distress.     Appearance: She is well-developed. She is not ill-appearing, toxic-appearing or diaphoretic.   HENT:      Head: Normocephalic and atraumatic.      Right Ear: Ear canal and external ear normal. A middle ear effusion (trace) is present. Tympanic membrane is not perforated, erythematous, retracted or bulging.      Left Ear: Ear canal and external ear normal. A middle ear effusion (trace) is present. Tympanic membrane is not perforated, erythematous, retracted or bulging.      Nose:      Right Sinus: No maxillary sinus tenderness or frontal sinus tenderness.      Left Sinus: No maxillary sinus tenderness or frontal sinus tenderness.      Mouth/Throat:      Mouth: Mucous membranes are moist.      " Pharynx: No oropharyngeal exudate or posterior oropharyngeal erythema.      Comments: Little white postnasal drip present.   Eyes:      General: No scleral icterus.     Extraocular Movements: Extraocular movements intact.      Conjunctiva/sclera: Conjunctivae normal.      Pupils: Pupils are equal, round, and reactive to light.   Cardiovascular:      Rate and Rhythm: Normal rate and regular rhythm.      Heart sounds: Normal heart sounds. No murmur heard.     No friction rub. No gallop.   Pulmonary:      Effort: Pulmonary effort is normal. No respiratory distress.      Breath sounds: Normal breath sounds. No stridor. No wheezing, rhonchi or rales.   Chest:      Chest wall: No tenderness.   Abdominal:      General: Abdomen is flat. Bowel sounds are normal.      Palpations: Abdomen is soft.      Tenderness: There is abdominal tenderness in the suprapubic area. There is no right CVA tenderness or left CVA tenderness.   Musculoskeletal:         General: No tenderness or deformity. Normal range of motion.      Cervical back: Normal range of motion and neck supple. No rigidity or tenderness.      Right lower leg: No edema.      Left lower leg: No edema.   Lymphadenopathy:      Cervical: No cervical adenopathy.   Skin:     General: Skin is warm and dry.      Capillary Refill: Capillary refill takes less than 2 seconds.      Findings: No rash.   Neurological:      Mental Status: She is alert and oriented to person, place, and time.      Cranial Nerves: No cranial nerve deficit.      Sensory: No sensory deficit.      Motor: No abnormal muscle tone.      Coordination: Coordination normal.      Gait: Gait normal.      Deep Tendon Reflexes: Reflexes normal.   Psychiatric:         Mood and Affect: Mood normal.         Behavior: Behavior normal.         Thought Content: Thought content normal.         Judgment: Judgment normal.           Brief Urine Lab Results  (Last result in the past 365 days)        Color   Clarity   Blood    Leuk Est   Nitrite   Protein   CREAT   Urine HCG        03/11/25 0835 Yellow   Clear   Negative   Trace   Negative   Negative                     Assessment / Plan      Assessment/Plan:   Diagnoses and all orders for this visit:    1. Dysuria (Primary)  -     POCT urinalysis dipstick, automated  -     Urine Culture - , Urine, Clean Catch  -     amoxicillin-clavulanate (AUGMENTIN) 875-125 MG per tablet; Take 1 tablet by mouth 2 (Two) Times a Day for 7 days.  Dispense: 14 tablet; Refill: 0    2. Yeast infection  -     nystatin (MYCOSTATIN) 363836 UNIT/GM ointment; Apply 1 Application topically to the appropriate area as directed 2 (Two) Times a Day.  Dispense: 30 g; Refill: 1  -     fluconazole (Diflucan) 150 MG tablet; Take 1 tablet by mouth 1 (One) Time Per Week for 2 doses.  Dispense: 2 tablet; Refill: 0       Treating presumptively for urinary tract infection induced by antibiotic associated diarrhea from Omnicef use 1 month ago.  Treating with Augmentin as she states this is the only antibiotic that works for her and is well-tolerated.  Also suspect yeast infection, treat with Diflucan orally and nystatin ointment topically.        Follow Up:   Return for Annual physical.    Patient was given instructions and counseling regarding her condition or for health maintenance advice. Please see specific information pulled into the AVS if appropriate.     Linnette Marroquin PA-C  [facility] [address] [facility]     Please note that portions of this note may have been completed with a voice recognition program.

## 2025-03-13 LAB
BACTERIA UR CULT: NORMAL
BACTERIA UR CULT: NORMAL

## 2025-05-06 ENCOUNTER — PATIENT MESSAGE (OUTPATIENT)
Dept: INTERNAL MEDICINE | Facility: CLINIC | Age: 70
End: 2025-05-06

## 2025-05-06 ENCOUNTER — TELEMEDICINE (OUTPATIENT)
Dept: INTERNAL MEDICINE | Facility: CLINIC | Age: 70
End: 2025-05-06
Payer: MEDICARE

## 2025-05-06 VITALS — BODY MASS INDEX: 20.96 KG/M2 | HEIGHT: 69 IN

## 2025-05-06 DIAGNOSIS — F41.9 ANXIETY: Primary | ICD-10-CM

## 2025-05-06 DIAGNOSIS — F41.9 ANXIETY: ICD-10-CM

## 2025-05-06 PROCEDURE — 1160F RVW MEDS BY RX/DR IN RCRD: CPT | Performed by: NURSE PRACTITIONER

## 2025-05-06 PROCEDURE — 1125F AMNT PAIN NOTED PAIN PRSNT: CPT | Performed by: NURSE PRACTITIONER

## 2025-05-06 PROCEDURE — 99213 OFFICE O/P EST LOW 20 MIN: CPT | Performed by: NURSE PRACTITIONER

## 2025-05-06 PROCEDURE — 1159F MED LIST DOCD IN RCRD: CPT | Performed by: NURSE PRACTITIONER

## 2025-05-06 RX ORDER — FLUOXETINE 10 MG/1
0.5 TABLET ORAL 3 TIMES DAILY
Qty: 90 EACH | Refills: 1 | Status: SHIPPED | OUTPATIENT
Start: 2025-05-06 | End: 2025-05-06 | Stop reason: SDUPTHER

## 2025-05-06 RX ORDER — FLUOXETINE 10 MG/1
0.5 TABLET ORAL 3 TIMES DAILY
Qty: 90 EACH | Refills: 1 | Status: SHIPPED | OUTPATIENT
Start: 2025-05-06

## 2025-05-06 NOTE — PROGRESS NOTES
Mode of Visit: Video  Location of patient: -HOME-  Location of provider: [facility]  You have chosen to receive care through a telehealth visit.  The patient has signed the video visit consent form.  The visit included audio and video interaction. No technical issues occurred during this visit.    Date: 2025  Name: Leticia Arreguin  : 1955         Chief Complaint:   Chief Complaint   Patient presents with    mood disorder         HPI:  Leticia Arreguin is a 69 y.o. female presents for video visit in regard to depression, anxiety.  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 She also references dysfunctional family dynamics with other members of the family.  She is happy to be able to care for , has increased stress.  She is taking fluoxetine twice daily.  Finds it works better if she takes 5 mg BID rather than 10 mg once daily.  Requesting dose be increased slightly.  Sensitive to medication changes, does feel increasing dose for a short period of time would certainly benefit her.  Denies anhedonia,feelings of worthlessness, difficulty concentrating, impaired memory, SI, HI, panic attacks.    History: The following portions of the patient's history were reviewed and updated as appropriate: allergies, current medications, past medical history, family history, surgical history, social history and problem list.      ROS:  Review of Systems      PE:  Physical Exam   Constitutional: She appears well-developed and well-nourished. No distress.   HENT:   Head: Normocephalic.   Right Ear: External ear normal.   Left Ear: External ear normal.   Eyes: Pupils are equal, round, and reactive to light. Conjunctivae are normal.   Neck: Neck normal appearance.  Pulmonary/Chest: Effort normal.   Neurological: She is alert.   Oriented x 3   Skin: No pallor.   Psychiatric: She has a normal mood and affect. Her speech is normal and  behavior is normal. Thought content normal. Her affect is normal.          Assessment/Plan:  Diagnoses and all orders for this visit:    1. Anxiety (Primary)  -     FLUoxetine HCl, PMDD, 10 MG tablet; Take 0.5 tablets by mouth 3 times a day.  Dispense: 90 each; Refill: 1.  Discussed several different ways to increase dose of fluoxetine slightly, this is what patient feels will work best. Advised insurance may not cover medication cost as this is not how it is typically prescribed.          - Encouraged to take part in daily physical exercise.          - Eat healthy, well balanced diet; avoid sugary foods or beverages        - Continue to abstain from alcohol and drugs         - Ensure good night's sleep by creating calm space in bedroom, avoiding screen time 1-2 hours before bed, no caffeine after 5 pm        - Talk to supportive family and friends, as needed        - Consider journaling, other creative way to express feelings, if needed          Return for Medicare Wellness.  Patient was given instructions and counseling regarding her condition or for health maintenance advice. Please see specific information pulled into the AVS if appropriate.

## 2025-05-07 ENCOUNTER — PRIOR AUTHORIZATION (OUTPATIENT)
Dept: INTERNAL MEDICINE | Facility: CLINIC | Age: 70
End: 2025-05-07
Payer: MEDICARE

## 2025-05-07 DIAGNOSIS — F41.9 ANXIETY: ICD-10-CM

## 2025-05-07 RX ORDER — FLUOXETINE 10 MG/1
5 TABLET, FILM COATED ORAL 3 TIMES DAILY
Qty: 45 TABLET | Refills: 0 | Status: SHIPPED | OUTPATIENT
Start: 2025-05-07 | End: 2025-06-06

## 2025-05-07 NOTE — TELEPHONE ENCOUNTER
Key: [id number]  Approved today by [employer] N[employer] 2017  Your request has been approved  Effective Date: 1/1/2025  Authorization Expiration Date: 12/31/2025  Drug  FLUoxetine HCl 10MG tablets  ePA cloud logo  Form  [employer] Electronic PA Form (2017 NCPDP)    NEW RX SENT TO REFLECT DOSE

## 2025-05-30 RX ORDER — FLUOXETINE 10 MG/1
5 TABLET, FILM COATED ORAL 3 TIMES DAILY
Qty: 135 TABLET | Refills: 3 | Status: SHIPPED | OUTPATIENT
Start: 2025-05-30 | End: 2025-06-29

## 2025-06-05 DIAGNOSIS — I10 BENIGN ESSENTIAL HYPERTENSION: ICD-10-CM

## 2025-06-05 DIAGNOSIS — R60.0 BILATERAL LEG EDEMA: ICD-10-CM

## 2025-06-05 RX ORDER — TRIAMTERENE AND HYDROCHLOROTHIAZIDE 37.5; 25 MG/1; MG/1
1 TABLET ORAL DAILY
Qty: 30 TABLET | Refills: 0 | Status: SHIPPED | OUTPATIENT
Start: 2025-06-05

## 2025-06-05 NOTE — TELEPHONE ENCOUNTER
Rx Refill Note  Failed protocol  Requested Prescriptions     Pending Prescriptions Disp Refills    triamterene-hydrochlorothiazide (MAXZIDE-25) 37.5-25 MG per tablet [Pharmacy Med Name: TRIAMTERENE-HCTZ 37.5-25 MG TB] 90 tablet 3     Sig: TAKE 1 TABLET BY MOUTH EVERY DAY      Last office visit with prescribing clinician: 1/29/2024   Last telemedicine visit with prescribing clinician: 5/6/2025   Next office visit with prescribing clinician: Visit date not found                         Would you like a call back once the refill request has been completed: [] Yes [] No    If the office needs to give you a call back, can they leave a voicemail: [] Yes [] No    Chinyere Kahn, MIL[place]RO  06/05/25, 08:09 EDT

## 2025-07-03 DIAGNOSIS — R60.0 BILATERAL LEG EDEMA: ICD-10-CM

## 2025-07-03 DIAGNOSIS — I10 BENIGN ESSENTIAL HYPERTENSION: ICD-10-CM

## 2025-07-03 RX ORDER — TRIAMTERENE AND HYDROCHLOROTHIAZIDE 37.5; 25 MG/1; MG/1
1 TABLET ORAL DAILY
Qty: 30 TABLET | Refills: 0 | Status: SHIPPED | OUTPATIENT
Start: 2025-07-03

## 2025-07-08 ENCOUNTER — TRANSCRIBE ORDERS (OUTPATIENT)
Dept: ADMINISTRATIVE | Facility: HOSPITAL | Age: 70
End: 2025-07-08
Payer: MEDICARE

## 2025-07-08 DIAGNOSIS — M25.561 RIGHT KNEE PAIN, UNSPECIFIED CHRONICITY: Primary | ICD-10-CM

## 2025-07-10 ENCOUNTER — HOSPITAL ENCOUNTER (OUTPATIENT)
Dept: CT IMAGING | Facility: HOSPITAL | Age: 70
Discharge: HOME | End: 2025-07-10
Admitting: PHYSICIAN ASSISTANT
Payer: MEDICARE

## 2025-07-10 DIAGNOSIS — M25.561 RIGHT KNEE PAIN, UNSPECIFIED CHRONICITY: ICD-10-CM

## 2025-07-10 PROCEDURE — 73700 CT LOWER EXTREMITY W/O DYE: CPT

## 2025-07-29 ENCOUNTER — OFFICE VISIT (OUTPATIENT)
Dept: INTERNAL MEDICINE | Facility: CLINIC | Age: 70
End: 2025-07-29
Payer: MEDICARE

## 2025-07-29 VITALS
SYSTOLIC BLOOD PRESSURE: 134 MMHG | OXYGEN SATURATION: 98 % | BODY MASS INDEX: 22.22 KG/M2 | WEIGHT: 150 LBS | TEMPERATURE: 98.2 F | HEART RATE: 86 BPM | HEIGHT: 69 IN | DIASTOLIC BLOOD PRESSURE: 72 MMHG

## 2025-07-29 DIAGNOSIS — Z00.00 ENCOUNTER FOR ANNUAL PHYSICAL EXAMINATION EXCLUDING GYNECOLOGICAL EXAMINATION IN A PATIENT OLDER THAN 17 YEARS: ICD-10-CM

## 2025-07-29 DIAGNOSIS — Z00.00 ENCOUNTER FOR SUBSEQUENT ANNUAL WELLNESS VISIT (AWV) IN MEDICARE PATIENT: Primary | ICD-10-CM

## 2025-07-29 DIAGNOSIS — R60.0 BILATERAL LEG EDEMA: ICD-10-CM

## 2025-07-29 DIAGNOSIS — I10 BENIGN ESSENTIAL HYPERTENSION: ICD-10-CM

## 2025-07-29 DIAGNOSIS — F41.9 ANXIETY: ICD-10-CM

## 2025-07-29 DIAGNOSIS — M79.7 FIBROMYALGIA: ICD-10-CM

## 2025-07-29 DIAGNOSIS — E78.5 DYSLIPIDEMIA: ICD-10-CM

## 2025-07-29 RX ORDER — TOFACITINIB 11 MG/1
TABLET, FILM COATED, EXTENDED RELEASE ORAL
COMMUNITY
Start: 2025-07-26

## 2025-07-29 NOTE — PROGRESS NOTES
Subjective   The ABCs of the Annual Wellness Visit  Medicare Wellness Visit      Leticia Arreguin is a 69 y.o. patient who presents for a Medicare Wellness Visit.    The following portions of the patient's history were reviewed and   updated as appropriate: allergies, current medications, past family history, past medical history, past social history, past surgical history, and problem list.    Compared to one year ago, the patient's physical   health is worse.  Compared to one year ago, the patient's mental   health is worse.    Recent Hospitalizations:  She was not admitted to the hospital during the last year.     Current Medical Providers:  Patient Care Team:  Estella Young APRN as PCP - General (Family Medicine)    Outpatient Medications Prior to Visit   Medication Sig Dispense Refill    Xeljanz XR 11 MG tablet sustained-release 24 hour       ASHWAGANDHA PO Take  by mouth.      cyclobenzaprine (FLEXERIL) 10 MG tablet TAKE 1 TABLET BY MOUTH EVERYDAY AT BEDTIME 30 tablet 1    Enbrel SureClick 50 MG/ML solution auto-injector       etodolac (LODINE) 400 MG tablet Take 1 tablet by mouth 2 (Two) Times a Day With Meals.      ezetimibe (ZETIA) 10 MG tablet TAKE 1 TABLET BY MOUTH EVERY DAY 90 tablet 3    FLUoxetine (PROzac) 10 MG tablet Take 0.5 tablets by mouth 3 (Three) Times a Day for 30 days. 135 tablet 3    FLUoxetine HCl, PMDD, 10 MG tablet Take 0.5 tablets by mouth 3 times a day. 90 each 1    fluticasone (FLONASE) 50 MCG/ACT nasal spray 1-2 sprays each nostril daily 16 g 0    guaiFENesin (MUCINEX) 600 MG 12 hr tablet Take 2 tablets by mouth.      leflunomide (ARAVA) 20 MG tablet Take 1 tablet by mouth Daily.      MAGNESIUM PO Take  by mouth.      multivitamin (MULTIVITAMIN PO) Take  by mouth Daily.      nystatin (MYCOSTATIN) 642127 UNIT/GM ointment Apply 1 Application topically to the appropriate area as directed 2 (Two) Times a Day. 30 g 1    Probiotic Product (PROBIOTIC-10) capsule Take  by mouth.       "triamterene-hydrochlorothiazide (MAXZIDE-25) 37.5-25 MG per tablet Take 1 tablet by mouth Daily. 30 tablet 0     No facility-administered medications prior to visit.     No opioid medication identified on active medication list. I have reviewed chart for other potential  high risk medication/s and harmful drug interactions in the elderly.      Aspirin is not on active medication list.  Aspirin use is not indicated based on review of current medical condition/s. Risk of harm outweighs potential benefits.  .    Patient Active Problem List   Diagnosis    Asthma    Benign essential hypertension    Brachial (cervical) neuritis    Constipation    Gastroesophageal reflux disease with esophagitis    Fibromyalgia    Hearing loss    Common migraine with intractable migraine    Tinnitus    Cobalamin deficiency    Vitamin D deficiency    Major depression, recurrent    Dyslipidemia    Migraine    Insomnia    Osteoarthritis    Anxiety     Advance Care Planning Advance Directive is not on file.  ACP discussion was held with the patient during this visit. Patient does not have an advance directive, information provided.            Objective   Vitals:    07/29/25 0821   BP: 134/72   Pulse: 86   Temp: 98.2 °F (36.8 °C)   SpO2: 98%   Weight: 68 kg (150 lb)   Height: 175.3 cm (69.02\")   PainSc: 5        Estimated body mass index is 22.14 kg/m² as calculated from the following:    Height as of this encounter: 175.3 cm (69.02\").    Weight as of this encounter: 68 kg (150 lb).    BMI is within normal parameters. No other follow-up for BMI required.           Does the patient have evidence of cognitive impairment? No                                                                                                Health  Risk Assessment    Smoking Status:  Social History     Tobacco Use   Smoking Status Never   Smokeless Tobacco Never     Alcohol Consumption:  Social History     Substance and Sexual Activity   Alcohol Use No       Fall Risk " Screen  STEADI Fall Risk Assessment was completed, and patient is at MODERATE risk for falls. Assessment completed on:2025    Depression Screening   Little interest or pleasure in doing things? Not at all   Feeling down, depressed, or hopeless? Not at all   PHQ-2 Total Score 0      Health Habits and Functional and Cognitive Screenin/29/2025     8:32 AM   Functional & Cognitive Status   Do you have difficulty preparing food and eating? No   Do you have difficulty bathing yourself, getting dressed or grooming yourself? No   Do you have difficulty using the toilet? No   Do you have difficulty moving around from place to place? No   Do you have trouble with steps or getting out of a bed or a chair? Yes   Current Diet Well Balanced Diet   Dental Exam Up to date   Eye Exam Up to date   Exercise (times per week) 7 times per week   Current Exercises Include Walking   Do you need help using the phone?  No   Are you deaf or do you have serious difficulty hearing?  Yes   Do you need help to go to places out of walking distance? No   Do you need help shopping? No   Do you need help preparing meals?  No   Do you need help with housework?  No   Do you need help with laundry? No   Do you need help taking your medications? No   Do you need help managing money? No   Do you ever drive or ride in a car without wearing a seat belt? No   Have you felt unusual fatigue (could be tiredness), stress, anger or loneliness in the last month? No   Who do you live with? Spouse   If you need help, do you have trouble finding someone available to you? No   Have you been bothered in the last four weeks by sexual problems? No   Do you have difficulty concentrating, remembering or making decisions? Yes           Age-appropriate Screening Schedule:  Refer to the list below for future screening recommendations based on patient's age, sex and/or medical conditions. Orders for these recommended tests are listed in the plan section. The  patient has been provided with a written plan.    Health Maintenance List  Health Maintenance   Topic Date Due    Pneumococcal Vaccine 50+ (1 of 2 - PCV) Never done    COLORECTAL CANCER SCREENING  10/07/2023    DXA SCAN  10/16/2025    TDAP/TD VACCINES (1 - Tdap) 01/25/2026 (Originally 12/29/1974)    ZOSTER VACCINE (1 of 2) 01/25/2026 (Originally 12/29/2005)    COVID-19 Vaccine (2 - 2024-25 season) 01/29/2026 (Originally 9/1/2024)    INFLUENZA VACCINE  10/01/2025    MAMMOGRAM  01/22/2026    ANNUAL WELLNESS VISIT  07/29/2026    HEPATITIS C SCREENING  Completed                                                                                                                                                CMS Preventative Services Quick Reference  Risk Factors Identified During Encounter  Depression/Dysphoria: Current medication is effective, no change recommended  Immunizations Discussed/Encouraged: Capvaxive (Pneumococcal conjugate - PCV21)  Polypharmacy: Medication List reviewed and Medications are appropriate for patient    The above risks/problems have been discussed with the patient.  Pertinent information has been shared with the patient in the After Visit Summary.  An After Visit Summary and PPPS were made available to the patient.    Follow Up:   Next Medicare Wellness visit to be scheduled in 1 year.         Additional E&M Note during same encounter follows:  Patient has additional, significant, and separately identifiable condition(s)/problem(s) that require work above and beyond the Medicare Wellness Visit     Chief Complaint  Medicare Wellness-subsequent    Subjective     History of Present Illness  Leticia is also being seen today for an annual adult preventative physical exam.     She has been experiencing severe knee pain for the past 3.5 weeks, which she attributes to her leg being an inch longer due to a previous hip surgery on the left side. This issue has been ongoing, but she has been trying to manage  "it. Last year, she sustained a minor fracture in the same area, which caused similar pain. An x-ray revealed arthritis but no fracture. She consulted Dr. Hernandez, who recommended a standing x-ray for a more accurate assessment. She also sought advice from [facility], where she received a steroid injection and underwent a CT scan. The scan showed no fracture but indicated bone-on-bone contact. She then received a gel injection, which unfortunately led to a severe reaction, causing her to be unable to walk. She completed a full course of steroids and used crutches for mobility. Despite these interventions, she continues to experience pain, particularly in the mornings, and occasionally uses a walking stick. She plans to consult with [facility].    She underwent eye surgery in 02/2025, which did not yield the desired results. She was diagnosed with dry eyes and uses eye drops for relief. She also experiences floaters.    She reports that her psoriatic arthritis has worsened, affecting her nails. She regularly consults with Dr. Valderrama for this condition.    She is scheduled to see a functional health specialist in [place] in 09/2025. She is taking supplements and reports no gastrointestinal issues. She believes her physical and mental health have remained stable over the past year. She uses hearing aids and reports fluid accumulation in her ears, which is somewhat alleviated by Mucinex. She finds nasal spray helpful in the mornings.      Review of Systems   All other systems reviewed and are negative.     Objective   Vital Signs:  /72   Pulse 86   Temp 98.2 °F (36.8 °C)   Ht 175.3 cm (69.02\")   Wt 68 kg (150 lb)   SpO2 98%   BMI 22.14 kg/m²   Physical Exam  Constitutional:       Appearance: She is well-developed. She is not ill-appearing.   HENT:      Head: Normocephalic.      Right Ear: Tympanic membrane, ear canal and external ear normal.      Left Ear: Tympanic membrane, ear " canal and external ear normal.      Nose: Nose normal.      Mouth/Throat:      Mouth: Mucous membranes are moist.      Pharynx: Oropharynx is clear. Uvula midline.   Eyes:      Extraocular Movements: Extraocular movements intact.      Conjunctiva/sclera: Conjunctivae normal.      Pupils: Pupils are equal, round, and reactive to light.   Neck:      Thyroid: No thyromegaly.      Vascular: No carotid bruit.   Cardiovascular:      Rate and Rhythm: Normal rate and regular rhythm.      Heart sounds: Normal heart sounds.   Pulmonary:      Effort: Pulmonary effort is normal.      Breath sounds: Normal breath sounds.   Abdominal:      General: Bowel sounds are normal.      Palpations: Abdomen is soft.      Tenderness: There is no abdominal tenderness.   Musculoskeletal:      Cervical back: Full passive range of motion without pain, normal range of motion and neck supple.      Right knee: Swelling and deformity present. Decreased range of motion. Tenderness present.   Lymphadenopathy:      Cervical: No cervical adenopathy.   Skin:     General: Skin is warm.      Capillary Refill: Capillary refill takes less than 2 seconds.   Neurological:      Mental Status: She is alert and oriented to person, place, and time.      Sensory: No sensory deficit.      Coordination: Coordination normal.      Gait: Gait abnormal.      Comments: CN II-XII normal   Psychiatric:         Attention and Perception: Attention normal.         Mood and Affect: Mood and affect normal.         Speech: Speech normal.         Behavior: Behavior normal.         Thought Content: Thought content normal.                    Assessment and Plan      Encounter for subsequent annual wellness visit (AWV) in Medicare patient         Encounter for annual physical examination excluding gynecological examination in a patient older than 17 years         Benign essential hypertension           Dyslipidemia         Anxiety         Fibromyalgia                 Follow Up   No  follow-ups on file.  Patient was given instructions and counseling regarding her condition or for health maintenance advice. Please see specific information pulled into the AVS if appropriate.

## 2025-07-31 RX ORDER — TRIAMTERENE AND HYDROCHLOROTHIAZIDE 37.5; 25 MG/1; MG/1
1 TABLET ORAL DAILY
Qty: 30 TABLET | Refills: 0 | Status: SHIPPED | OUTPATIENT
Start: 2025-07-31

## 2025-08-06 ENCOUNTER — HOSPITAL ENCOUNTER (OUTPATIENT)
Dept: ULTRASOUND IMAGING | Facility: HOSPITAL | Age: 70
Discharge: HOME | End: 2025-08-06
Admitting: PHYSICIAN ASSISTANT
Payer: MEDICARE

## 2025-08-06 ENCOUNTER — TRANSCRIBE ORDERS (OUTPATIENT)
Dept: ADMINISTRATIVE | Facility: HOSPITAL | Age: 70
End: 2025-08-06
Payer: MEDICARE

## 2025-08-06 DIAGNOSIS — M79.604 RIGHT LEG PAIN: Primary | ICD-10-CM

## 2025-08-06 DIAGNOSIS — M79.604 RIGHT LEG PAIN: ICD-10-CM

## 2025-08-06 PROCEDURE — 93971 EXTREMITY STUDY: CPT

## 2025-08-28 DIAGNOSIS — I10 BENIGN ESSENTIAL HYPERTENSION: ICD-10-CM

## 2025-08-28 DIAGNOSIS — R60.0 BILATERAL LEG EDEMA: ICD-10-CM

## 2025-08-28 RX ORDER — TRIAMTERENE AND HYDROCHLOROTHIAZIDE 37.5; 25 MG/1; MG/1
1 TABLET ORAL DAILY
Qty: 30 TABLET | Refills: 0 | Status: SHIPPED | OUTPATIENT
Start: 2025-08-28